# Patient Record
Sex: FEMALE | Race: WHITE | NOT HISPANIC OR LATINO | Employment: OTHER
[De-identification: names, ages, dates, MRNs, and addresses within clinical notes are randomized per-mention and may not be internally consistent; named-entity substitution may affect disease eponyms.]

---

## 2018-11-05 ENCOUNTER — RECORDS - HEALTHEAST (OUTPATIENT)
Dept: ADMINISTRATIVE | Facility: OTHER | Age: 78
End: 2018-11-05

## 2018-11-06 ENCOUNTER — AMBULATORY - HEALTHEAST (OUTPATIENT)
Dept: SCHEDULING | Facility: CLINIC | Age: 78
End: 2018-11-06

## 2018-11-06 DIAGNOSIS — Z78.0 POSTMENOPAUSAL: ICD-10-CM

## 2018-11-06 DIAGNOSIS — M85.80 OSTEOPENIA: ICD-10-CM

## 2018-12-12 ENCOUNTER — HOSPITAL ENCOUNTER (OUTPATIENT)
Dept: MAMMOGRAPHY | Facility: CLINIC | Age: 78
Discharge: HOME | End: 2018-12-12
Attending: INTERNAL MEDICINE

## 2018-12-12 DIAGNOSIS — Z12.31 VISIT FOR SCREENING MAMMOGRAM: ICD-10-CM

## 2019-12-20 ENCOUNTER — HOSPITAL ENCOUNTER (OUTPATIENT)
Dept: MAMMOGRAPHY | Facility: CLINIC | Age: 79
Discharge: HOME | End: 2019-12-20
Attending: INTERNAL MEDICINE

## 2019-12-20 DIAGNOSIS — Z12.31 VISIT FOR SCREENING MAMMOGRAM: ICD-10-CM

## 2020-01-06 ENCOUNTER — TRANSFERRED RECORDS (OUTPATIENT)
Dept: HEALTH INFORMATION MANAGEMENT | Facility: CLINIC | Age: 80
End: 2020-01-06

## 2020-01-09 ENCOUNTER — HOSPITAL ENCOUNTER (OUTPATIENT)
Dept: RADIOLOGY | Facility: CLINIC | Age: 80
Discharge: HOME | End: 2020-01-09
Attending: INTERNAL MEDICINE

## 2020-01-09 ENCOUNTER — RECORDS - HEALTHEAST (OUTPATIENT)
Dept: ADMINISTRATIVE | Facility: OTHER | Age: 80
End: 2020-01-09

## 2020-01-09 DIAGNOSIS — R52 PAIN: ICD-10-CM

## 2020-09-25 ENCOUNTER — TRANSFERRED RECORDS (OUTPATIENT)
Dept: HEALTH INFORMATION MANAGEMENT | Facility: CLINIC | Age: 80
End: 2020-09-25
Payer: COMMERCIAL

## 2020-10-19 ENCOUNTER — TRANSFERRED RECORDS (OUTPATIENT)
Dept: HEALTH INFORMATION MANAGEMENT | Facility: CLINIC | Age: 80
End: 2020-10-19
Payer: COMMERCIAL

## 2021-05-20 ENCOUNTER — HOSPITAL ENCOUNTER (OUTPATIENT)
Dept: MAMMOGRAPHY | Facility: CLINIC | Age: 81
Discharge: HOME | End: 2021-05-20
Attending: INTERNAL MEDICINE

## 2021-05-20 DIAGNOSIS — Z12.31 SCREENING MAMMOGRAM, ENCOUNTER FOR: ICD-10-CM

## 2021-05-24 ENCOUNTER — RECORDS - HEALTHEAST (OUTPATIENT)
Dept: ADMINISTRATIVE | Facility: CLINIC | Age: 81
End: 2021-05-24

## 2021-05-25 ENCOUNTER — RECORDS - HEALTHEAST (OUTPATIENT)
Dept: ADMINISTRATIVE | Facility: CLINIC | Age: 81
End: 2021-05-25

## 2021-05-26 ENCOUNTER — RECORDS - HEALTHEAST (OUTPATIENT)
Dept: ADMINISTRATIVE | Facility: CLINIC | Age: 81
End: 2021-05-26

## 2021-05-27 ENCOUNTER — RECORDS - HEALTHEAST (OUTPATIENT)
Dept: ADMINISTRATIVE | Facility: CLINIC | Age: 81
End: 2021-05-27

## 2021-05-28 ENCOUNTER — RECORDS - HEALTHEAST (OUTPATIENT)
Dept: ADMINISTRATIVE | Facility: CLINIC | Age: 81
End: 2021-05-28

## 2021-05-30 ENCOUNTER — RECORDS - HEALTHEAST (OUTPATIENT)
Dept: ADMINISTRATIVE | Facility: CLINIC | Age: 81
End: 2021-05-30

## 2021-06-01 ENCOUNTER — RECORDS - HEALTHEAST (OUTPATIENT)
Dept: ADMINISTRATIVE | Facility: CLINIC | Age: 81
End: 2021-06-01

## 2021-06-02 ENCOUNTER — RECORDS - HEALTHEAST (OUTPATIENT)
Dept: ADMINISTRATIVE | Facility: CLINIC | Age: 81
End: 2021-06-02

## 2021-06-05 ENCOUNTER — RECORDS - HEALTHEAST (OUTPATIENT)
Dept: SCHEDULING | Facility: CLINIC | Age: 81
End: 2021-06-05

## 2021-06-05 DIAGNOSIS — R10.32 ABDOMINAL PAIN, LEFT LOWER QUADRANT: ICD-10-CM

## 2021-06-16 PROBLEM — I26.99 PULMONARY EMBOLISM ON RIGHT (H): Status: ACTIVE | Noted: 2020-01-06

## 2021-06-16 PROBLEM — J45.21 MILD INTERMITTENT ASTHMA WITH EXACERBATION: Status: ACTIVE | Noted: 2020-01-06

## 2021-06-16 PROBLEM — I26.99 ACUTE PULMONARY EMBOLISM WITHOUT ACUTE COR PULMONALE, UNSPECIFIED PULMONARY EMBOLISM TYPE (H): Status: ACTIVE | Noted: 2020-01-06

## 2021-07-13 ENCOUNTER — RECORDS - HEALTHEAST (OUTPATIENT)
Dept: ADMINISTRATIVE | Facility: CLINIC | Age: 81
End: 2021-07-13

## 2021-07-21 ENCOUNTER — RECORDS - HEALTHEAST (OUTPATIENT)
Dept: ADMINISTRATIVE | Facility: CLINIC | Age: 81
End: 2021-07-21

## 2021-09-07 RX ORDER — BECLOMETHASONE DIPROPIONATE HFA 80 UG/1
AEROSOL, METERED RESPIRATORY (INHALATION)
COMMUNITY
Start: 2021-05-28 | End: 2022-01-02

## 2021-09-07 RX ORDER — HYDROCODONE BITARTRATE AND ACETAMINOPHEN 5; 325 MG/1; MG/1
TABLET ORAL
COMMUNITY
Start: 2020-12-03 | End: 2021-09-08

## 2021-09-08 ENCOUNTER — OFFICE VISIT (OUTPATIENT)
Dept: FAMILY MEDICINE | Facility: CLINIC | Age: 81
End: 2021-09-08
Payer: COMMERCIAL

## 2021-09-08 DIAGNOSIS — M19.90 OSTEOARTHRITIS, UNSPECIFIED OSTEOARTHRITIS TYPE, UNSPECIFIED SITE: ICD-10-CM

## 2021-09-08 DIAGNOSIS — J45.21 MILD INTERMITTENT ASTHMA WITH EXACERBATION: ICD-10-CM

## 2021-09-08 DIAGNOSIS — E78.00 HYPERCHOLESTEREMIA: ICD-10-CM

## 2021-09-08 DIAGNOSIS — I10 BENIGN ESSENTIAL HYPERTENSION: ICD-10-CM

## 2021-09-08 DIAGNOSIS — Z00.00 ENCOUNTER FOR MEDICAL EXAMINATION TO ESTABLISH CARE: Primary | ICD-10-CM

## 2021-09-08 DIAGNOSIS — I26.99 ACUTE PULMONARY EMBOLISM WITHOUT ACUTE COR PULMONALE, UNSPECIFIED PULMONARY EMBOLISM TYPE (H): ICD-10-CM

## 2021-09-08 PROCEDURE — 99204 OFFICE O/P NEW MOD 45 MIN: CPT | Mod: 25 | Performed by: FAMILY MEDICINE

## 2021-09-08 PROCEDURE — 90662 IIV NO PRSV INCREASED AG IM: CPT | Performed by: FAMILY MEDICINE

## 2021-09-08 PROCEDURE — G0008 ADMIN INFLUENZA VIRUS VAC: HCPCS | Performed by: FAMILY MEDICINE

## 2021-09-08 RX ORDER — IBUPROFEN 600 MG/1
600 TABLET, FILM COATED ORAL
COMMUNITY
Start: 2020-09-21 | End: 2021-12-13

## 2021-09-08 ASSESSMENT — ASTHMA QUESTIONNAIRES
QUESTION_3 LAST FOUR WEEKS HOW OFTEN DID YOUR ASTHMA SYMPTOMS (WHEEZING, COUGHING, SHORTNESS OF BREATH, CHEST TIGHTNESS OR PAIN) WAKE YOU UP AT NIGHT OR EARLIER THAN USUAL IN THE MORNING: TWO OR THREE NIGHTS A WEEK
QUESTION_5 LAST FOUR WEEKS HOW WOULD YOU RATE YOUR ASTHMA CONTROL: WELL CONTROLLED
ACT_TOTALSCORE: 15
QUESTION_4 LAST FOUR WEEKS HOW OFTEN HAVE YOU USED YOUR RESCUE INHALER OR NEBULIZER MEDICATION (SUCH AS ALBUTEROL): ONE OR TWO TIMES PER DAY
QUESTION_2 LAST FOUR WEEKS HOW OFTEN HAVE YOU HAD SHORTNESS OF BREATH: THREE TO SIX TIMES A WEEK
QUESTION_1 LAST FOUR WEEKS HOW MUCH OF THE TIME DID YOUR ASTHMA KEEP YOU FROM GETTING AS MUCH DONE AT WORK, SCHOOL OR AT HOME: A LITTLE OF THE TIME

## 2021-09-08 ASSESSMENT — MIFFLIN-ST. JEOR: SCORE: 1397.82

## 2021-09-08 NOTE — PATIENT INSTRUCTIONS
Check with pharmacy about shingles and Tetanus shots    BP - we would like this to stay under 150/90 - please call me if it is routinely over that number and we can adjust medications a little    Try Qvar twice daily for a few weeks

## 2021-09-09 VITALS
BODY MASS INDEX: 33.86 KG/M2 | DIASTOLIC BLOOD PRESSURE: 86 MMHG | TEMPERATURE: 98.8 F | WEIGHT: 203.25 LBS | SYSTOLIC BLOOD PRESSURE: 158 MMHG | HEIGHT: 65 IN | HEART RATE: 72 BPM | RESPIRATION RATE: 16 BRPM

## 2021-09-09 PROBLEM — I26.99 PULMONARY EMBOLISM ON RIGHT (H): Status: RESOLVED | Noted: 2020-01-06 | Resolved: 2021-09-09

## 2021-09-09 PROBLEM — I26.99 PULMONARY EMBOLISM, BILATERAL (H): Status: ACTIVE | Noted: 2021-04-29

## 2021-09-09 PROBLEM — M85.80 OSTEOPENIA: Status: ACTIVE | Noted: 2020-03-28

## 2021-09-09 PROBLEM — K85.10 GALLSTONE PANCREATITIS: Status: ACTIVE | Noted: 2021-09-09

## 2021-09-09 PROBLEM — J45.50 SEVERE PERSISTENT ASTHMA WITHOUT COMPLICATION (H): Status: ACTIVE | Noted: 2020-03-28

## 2021-09-09 PROBLEM — J45.50 SEVERE PERSISTENT ASTHMA WITHOUT COMPLICATION (H): Status: RESOLVED | Noted: 2020-03-28 | Resolved: 2021-09-09

## 2021-09-09 PROBLEM — I26.99 PULMONARY EMBOLISM, BILATERAL (H): Status: RESOLVED | Noted: 2021-04-29 | Resolved: 2021-09-09

## 2021-09-09 PROBLEM — I26.99 ACUTE PULMONARY EMBOLISM WITHOUT ACUTE COR PULMONALE, UNSPECIFIED PULMONARY EMBOLISM TYPE (H): Status: RESOLVED | Noted: 2020-01-06 | Resolved: 2021-09-09

## 2021-09-09 ASSESSMENT — ASTHMA QUESTIONNAIRES: ACT_TOTALSCORE: 15

## 2021-09-09 NOTE — PROGRESS NOTES
Assessment/Plan:     Aranza Mcmanus is a 81 year old female presenting for:    Encounter for medical examination to establish care  We reviewed patient's medical history today.  We will have her follow-up for her complete physical examination sometime in the next few months.    Mild intermittent asthma with exacerbation  Discussed using her Qvar twice daily instead of once daily for the next few weeks.  Discussed using albuterol scheduled every 6-8 hours for the next few days and then as needed thereafter.  No wheezing today.    Hypercholesteremia  We will recheck at her physical.    Acute pulmonary embolism without acute cor pulmonale, unspecified pulmonary embolism type (H)  This did seem to be unprovoked.  Patient has had a discussion with her previous provider regarding long-term anticoagulant she is not interested in doing.  She did between 3 and 6 months of anticoagulation after the diagnosis.    Osteoarthritis, unspecified osteoarthritis type, unspecified site  Encouraged her to take Tylenol once or twice daily as needed    Benign essential hypertension  Blood pressure above goal today.  Patient feels strongly that this is likely due to meeting a new provider and coming to the doctor's office.  She does have a cuff at home.  She will continue to monitor and will let me know over the next 1 to 2 weeks what her blood pressures are.  Given her age I think less than 150/90 would be adequate but aiming more towards the 140 range.        Medications Discontinued During This Encounter   Medication Reason     beclomethasone (QVAR) 80 mcg/actuation inhaler      apixaban (ELIQUIS) 5 mg Tab tablet      HYDROcodone-acetaminophen (NORCO) 5-325 MG tablet      polyethylene glycol (MIRALAX) 17 gram packet      oxyCODONE (ROXICODONE) 5 MG immediate release tablet          Chief Complaint:  Establish Care          Subjective:     Aranza Mcmanus is a 81 year old female who is a new patient to our clinic presenting for  establishment of care.    Patient previous doctor is retiring.    She has a past medical history significant for hypertension for which she is on lisinopril and atenolol.  It is her previous blood pressures have been within good range.  She takes her medications without difficulty.    She is a history of asthma.  She is currently on Qvar which is prescribed twice daily but she is using just once daily.  She notes that in her apartment there is smoke from cigarettes it comes in her window which flares her symptoms.  Due to this she is actually thinking about moving at some point soon.  She is wondering in the meantime what she should do to help with her symptoms of mild shortness of breath.    Additionally has a history of hyperlipidemia currently on a statin medication and tolerating it well.    Has osteoarthritis and is wondering what medication would be best for her to take.  Recent kidney function was just slightly decreased and it was recommended she not take any NSAID medications.    Review of Systems -  12 point review of systems completed by patient and found to be negative except for what is stated above    Medications: reviewed -       Current Outpatient Medications:      albuterol (PROAIR HFA;PROVENTIL HFA;VENTOLIN HFA) 90 mcg/actuation inhaler, [ALBUTEROL (PROAIR HFA;PROVENTIL HFA;VENTOLIN HFA) 90 MCG/ACTUATION INHALER] Inhale 2 puffs every 4 (four) hours as needed for wheezing or shortness of breath., Disp: , Rfl:      atenolol (TENORMIN) 25 MG tablet, [ATENOLOL (TENORMIN) 25 MG TABLET] Take 25 mg by mouth every morning. , Disp: , Rfl:      calcium carbonate (OS-STERLING) 600 mg calcium (1,500 mg) tablet, [CALCIUM CARBONATE (OS-STERLING) 600 MG CALCIUM (1,500 MG) TABLET] Take 600 mg by mouth daily., Disp: , Rfl:      cholecalciferol, vitamin D3, 1,000 unit (25 mcg) tablet, [CHOLECALCIFEROL, VITAMIN D3, 1,000 UNIT (25 MCG) TABLET] Take 1,000 Units by mouth daily., Disp: , Rfl:      ibuprofen (ADVIL/MOTRIN) 600 MG  tablet, Take 600 mg by mouth, Disp: , Rfl:      lisinopril (PRINIVIL,ZESTRIL) 40 MG tablet, [LISINOPRIL (PRINIVIL,ZESTRIL) 40 MG TABLET] Take 40 mg by mouth every morning. , Disp: , Rfl:      multivitamin therapeutic (THERAGRAN) tablet, [MULTIVITAMIN THERAPEUTIC (THERAGRAN) TABLET] Take 1 tablet by mouth every morning., Disp: , Rfl:      pravastatin (PRAVACHOL) 40 MG tablet, [PRAVASTATIN (PRAVACHOL) 40 MG TABLET] Take 40 mg by mouth every morning. , Disp: , Rfl:      QVAR REDIHALER 80 MCG/ACT inhaler, , Disp: , Rfl:       Past medical history: reviewed -   Past Medical History:   Diagnosis Date     Acute blood loss anemia 2015     Arthritis      Asthma      Hypercholesteremia      Hypertension        Past surgical history: reviewed -   Past Surgical History:   Procedure Laterality Date     ANKLE SURGERY Left      FOOT SURGERY Left      HYSTERECTOMY         Family history: reviewed -   Family History   Problem Relation Age of Onset     Breast Cancer Maternal Grandmother 30.00        bilateral mastectomy done yrs ago.       Social history: reviewed -   Social History     Socioeconomic History     Marital status: Single     Spouse name: None     Number of children: None     Years of education: None     Highest education level: None   Occupational History     None   Tobacco Use     Smoking status: Former Smoker     Quit date: 1975     Years since quittin.7     Smokeless tobacco: Never Used   Substance and Sexual Activity     Alcohol use: Yes     Alcohol/week: 0.8 standard drinks     Comment: Alcoholic Drinks/day: occ.     Drug use: Not Currently     Types: Marijuana     Sexual activity: None   Other Topics Concern     None   Social History Narrative     None     Social Determinants of Health     Financial Resource Strain:      Difficulty of Paying Living Expenses:    Food Insecurity:      Worried About Running Out of Food in the Last Year:      Ran Out of Food in the Last Year:    Transportation Needs:  "     Lack of Transportation (Medical):      Lack of Transportation (Non-Medical):    Physical Activity:      Days of Exercise per Week:      Minutes of Exercise per Session:    Stress:      Feeling of Stress :    Social Connections:      Frequency of Communication with Friends and Family:      Frequency of Social Gatherings with Friends and Family:      Attends Sikh Services:      Active Member of Clubs or Organizations:      Attends Club or Organization Meetings:      Marital Status:    Intimate Partner Violence:      Fear of Current or Ex-Partner:      Emotionally Abused:      Physically Abused:      Sexually Abused:        Objective:  Vitals:    09/08/21 1058 09/08/21 1129   BP: (!) 168/98 (!) 158/86   Pulse: 72    Resp: 16    Temp: 98.8  F (37.1  C)    TempSrc: Tympanic    Weight: 92.2 kg (203 lb 4 oz)    Height: 1.651 m (5' 5\")          Body mass index is 33.82 kg/m .    Vital signs reviewed and stable  General: No acute distress  Psych: Appropriate affect  HEENT: moist mucous membranes, pupils equal, round, reactive to light and accomodation, posterior oropharynx clear of erythema or exudate, tympanic membranes are pearly grey bilaterally  Lymph: no cervical or supraclavicular lymphadenopathy  Cardiovascular: regular rate and rhythm with no murmur  Pulmonary: clear to auscultation bilaterally with no wheeze  Abdomen: soft, non tender, non distended with normo-active bowel sounds  Extremities: warm and well perfused with no edema  Skin: warm and dry with no rash      "

## 2021-10-20 DIAGNOSIS — I10 BENIGN ESSENTIAL HYPERTENSION: ICD-10-CM

## 2021-10-20 DIAGNOSIS — E78.00 HYPERCHOLESTEREMIA: Primary | ICD-10-CM

## 2021-10-20 RX ORDER — LISINOPRIL 40 MG/1
40 TABLET ORAL EVERY MORNING
Qty: 90 TABLET | Refills: 2 | Status: SHIPPED | OUTPATIENT
Start: 2021-10-20 | End: 2022-11-22

## 2021-10-20 RX ORDER — ATENOLOL 25 MG/1
25 TABLET ORAL EVERY MORNING
Qty: 90 TABLET | Refills: 2 | Status: SHIPPED | OUTPATIENT
Start: 2021-10-20 | End: 2022-11-22

## 2021-10-20 RX ORDER — PRAVASTATIN SODIUM 40 MG
40 TABLET ORAL EVERY MORNING
Qty: 90 TABLET | Refills: 2 | Status: SHIPPED | OUTPATIENT
Start: 2021-10-20 | End: 2022-11-22

## 2021-10-20 NOTE — TELEPHONE ENCOUNTER
Patient requesting refills to [facility].    Candida Shelton, ELIO Lopez

## 2021-10-20 NOTE — TELEPHONE ENCOUNTER
Routing refill request to provider for review/approval because:  Medication is reported/historical    Ian Gilman RN

## 2021-12-08 PROBLEM — R73.03 PREDIABETES: Status: ACTIVE | Noted: 2020-03-28

## 2021-12-13 ENCOUNTER — OFFICE VISIT (OUTPATIENT)
Dept: FAMILY MEDICINE | Facility: CLINIC | Age: 81
End: 2021-12-13
Payer: COMMERCIAL

## 2021-12-13 VITALS
BODY MASS INDEX: 34.21 KG/M2 | RESPIRATION RATE: 16 BRPM | HEIGHT: 64 IN | DIASTOLIC BLOOD PRESSURE: 80 MMHG | SYSTOLIC BLOOD PRESSURE: 142 MMHG | TEMPERATURE: 98.5 F | HEART RATE: 72 BPM | WEIGHT: 200.38 LBS

## 2021-12-13 DIAGNOSIS — Z00.00 ENCOUNTER FOR MEDICARE ANNUAL WELLNESS EXAM: Primary | ICD-10-CM

## 2021-12-13 DIAGNOSIS — R30.0 DYSURIA: ICD-10-CM

## 2021-12-13 DIAGNOSIS — R07.9 CHEST PAIN, UNSPECIFIED TYPE: ICD-10-CM

## 2021-12-13 LAB
ALBUMIN SERPL-MCNC: 3.4 G/DL (ref 3.4–5)
ALBUMIN UR-MCNC: 30 MG/DL
ALP SERPL-CCNC: 79 U/L (ref 40–150)
ALT SERPL W P-5'-P-CCNC: 27 U/L (ref 0–50)
ANION GAP SERPL CALCULATED.3IONS-SCNC: 4 MMOL/L (ref 3–14)
APPEARANCE UR: CLEAR
AST SERPL W P-5'-P-CCNC: 16 U/L (ref 0–45)
BACTERIA #/AREA URNS HPF: ABNORMAL /HPF
BILIRUB SERPL-MCNC: 0.5 MG/DL (ref 0.2–1.3)
BILIRUB UR QL STRIP: NEGATIVE
BUN SERPL-MCNC: 18 MG/DL (ref 7–30)
CALCIUM SERPL-MCNC: 9.1 MG/DL (ref 8.5–10.1)
CHLORIDE BLD-SCNC: 108 MMOL/L (ref 94–109)
CHOLEST SERPL-MCNC: 175 MG/DL
CO2 SERPL-SCNC: 29 MMOL/L (ref 20–32)
COLOR UR AUTO: YELLOW
CREAT SERPL-MCNC: 0.95 MG/DL (ref 0.52–1.04)
FASTING STATUS PATIENT QL REPORTED: YES
GFR SERPL CREATININE-BSD FRML MDRD: 57 ML/MIN/1.73M2
GLUCOSE BLD-MCNC: 88 MG/DL (ref 70–99)
GLUCOSE UR STRIP-MCNC: NEGATIVE MG/DL
HBA1C MFR BLD: 5.8 % (ref 0–5.6)
HDLC SERPL-MCNC: 46 MG/DL
HGB UR QL STRIP: ABNORMAL
KETONES UR STRIP-MCNC: NEGATIVE MG/DL
LDLC SERPL CALC-MCNC: 84 MG/DL
LEUKOCYTE ESTERASE UR QL STRIP: ABNORMAL
NITRATE UR QL: NEGATIVE
NONHDLC SERPL-MCNC: 129 MG/DL
PH UR STRIP: 5.5 [PH] (ref 5–7)
POTASSIUM BLD-SCNC: 4.2 MMOL/L (ref 3.4–5.3)
PROT SERPL-MCNC: 7.1 G/DL (ref 6.8–8.8)
RBC #/AREA URNS AUTO: ABNORMAL /HPF
SODIUM SERPL-SCNC: 141 MMOL/L (ref 133–144)
SP GR UR STRIP: >=1.03 (ref 1–1.03)
SQUAMOUS #/AREA URNS AUTO: ABNORMAL /LPF
TRIGL SERPL-MCNC: 227 MG/DL
UROBILINOGEN UR STRIP-ACNC: 0.2 E.U./DL
WBC #/AREA URNS AUTO: ABNORMAL /HPF

## 2021-12-13 PROCEDURE — 87088 URINE BACTERIA CULTURE: CPT | Performed by: FAMILY MEDICINE

## 2021-12-13 PROCEDURE — 93000 ELECTROCARDIOGRAM COMPLETE: CPT | Performed by: FAMILY MEDICINE

## 2021-12-13 PROCEDURE — 87086 URINE CULTURE/COLONY COUNT: CPT | Performed by: FAMILY MEDICINE

## 2021-12-13 PROCEDURE — 83036 HEMOGLOBIN GLYCOSYLATED A1C: CPT | Performed by: FAMILY MEDICINE

## 2021-12-13 PROCEDURE — 36415 COLL VENOUS BLD VENIPUNCTURE: CPT | Performed by: FAMILY MEDICINE

## 2021-12-13 PROCEDURE — 81001 URINALYSIS AUTO W/SCOPE: CPT | Performed by: FAMILY MEDICINE

## 2021-12-13 PROCEDURE — 80061 LIPID PANEL: CPT | Performed by: FAMILY MEDICINE

## 2021-12-13 PROCEDURE — 99397 PER PM REEVAL EST PAT 65+ YR: CPT | Performed by: FAMILY MEDICINE

## 2021-12-13 PROCEDURE — 80053 COMPREHEN METABOLIC PANEL: CPT | Performed by: FAMILY MEDICINE

## 2021-12-13 PROCEDURE — 87186 SC STD MICRODIL/AGAR DIL: CPT | Performed by: FAMILY MEDICINE

## 2021-12-13 PROCEDURE — 99214 OFFICE O/P EST MOD 30 MIN: CPT | Mod: 25 | Performed by: FAMILY MEDICINE

## 2021-12-13 RX ORDER — SULFAMETHOXAZOLE/TRIMETHOPRIM 800-160 MG
1 TABLET ORAL 2 TIMES DAILY
Qty: 14 TABLET | Refills: 0 | Status: SHIPPED | OUTPATIENT
Start: 2021-12-13 | End: 2022-03-01

## 2021-12-13 ASSESSMENT — ASTHMA QUESTIONNAIRES
QUESTION_4 LAST FOUR WEEKS HOW OFTEN HAVE YOU USED YOUR RESCUE INHALER OR NEBULIZER MEDICATION (SUCH AS ALBUTEROL): ONCE A WEEK OR LESS
QUESTION_3 LAST FOUR WEEKS HOW OFTEN DID YOUR ASTHMA SYMPTOMS (WHEEZING, COUGHING, SHORTNESS OF BREATH, CHEST TIGHTNESS OR PAIN) WAKE YOU UP AT NIGHT OR EARLIER THAN USUAL IN THE MORNING: ONCE A WEEK
QUESTION_2 LAST FOUR WEEKS HOW OFTEN HAVE YOU HAD SHORTNESS OF BREATH: THREE TO SIX TIMES A WEEK
QUESTION_5 LAST FOUR WEEKS HOW WOULD YOU RATE YOUR ASTHMA CONTROL: SOMEWHAT CONTROLLED
QUESTION_1 LAST FOUR WEEKS HOW MUCH OF THE TIME DID YOUR ASTHMA KEEP YOU FROM GETTING AS MUCH DONE AT WORK, SCHOOL OR AT HOME: SOME OF THE TIME
ACT_TOTALSCORE: 16

## 2021-12-13 ASSESSMENT — ENCOUNTER SYMPTOMS
HEARTBURN: 0
NAUSEA: 0
DIARRHEA: 0
FREQUENCY: 1
ARTHRALGIAS: 1
ABDOMINAL PAIN: 0
HEADACHES: 0
FEVER: 0
SHORTNESS OF BREATH: 1
PARESTHESIAS: 1
CONSTIPATION: 0
PALPITATIONS: 1
WEAKNESS: 0
EYE PAIN: 0
COUGH: 0
HEMATOCHEZIA: 0
BREAST MASS: 0
SORE THROAT: 0
NERVOUS/ANXIOUS: 0
DIZZINESS: 0
JOINT SWELLING: 0
MYALGIAS: 1
HEMATURIA: 0
DYSURIA: 0
CHILLS: 0

## 2021-12-13 ASSESSMENT — ACTIVITIES OF DAILY LIVING (ADL): CURRENT_FUNCTION: NO ASSISTANCE NEEDED

## 2021-12-13 ASSESSMENT — MIFFLIN-ST. JEOR: SCORE: 1372.87

## 2021-12-13 NOTE — LETTER
December 16, 2021      Aranza Mcmanus  [address]   [facility] [place] [facility] [address]        Dear Ms.Yonathan,    We are writing to inform you of your test results.    It was great to see you.  Your urine did have a mild infection and the antibiotic given should have taken care of it!     Kidneys, electrolytes, blood sugar and liver function are normal.     A1C is just into the pre-diabetic range - we can recheck next year!     Cholesterol looks good overall.     Let me know if you have any questions!     Dr Mercedes/dakotah    Resulted Orders   Lipid panel reflex to direct LDL Fasting   Result Value Ref Range    Cholesterol 175 <200 mg/dL    Triglycerides 227 (H) <150 mg/dL    Direct Measure HDL 46 (L) >=50 mg/dL    LDL Cholesterol Calculated 84 <=100 mg/dL    Non HDL Cholesterol 129 <130 mg/dL    Patient Fasting > 8hrs? Yes     Narrative    Cholesterol  Desirable:  <200 mg/dL    Triglycerides  Normal:  Less than 150 mg/dL  Borderline High:  150-199 mg/dL  High:  200-499 mg/dL  Very High:  Greater than or equal to 500 mg/dL    Direct Measure HDL  Female:  Greater than or equal to 50 mg/dL   Male:  Greater than or equal to 40 mg/dL    LDL Cholesterol  Desirable:  <100mg/dL  Above Desirable:  100-129 mg/dL   Borderline High:  130-159 mg/dL   High:  160-189 mg/dL   Very High:  >= 190 mg/dL    Non HDL Cholesterol  Desirable:  130 mg/dL  Above Desirable:  130-159 mg/dL  Borderline High:  160-189 mg/dL  High:  190-219 mg/dL  Very High:  Greater than or equal to 220 mg/dL   COMPREHENSIVE METABOLIC PANEL   Result Value Ref Range    Sodium 141 133 - 144 mmol/L    Potassium 4.2 3.4 - 5.3 mmol/L    Chloride 108 94 - 109 mmol/L    Carbon Dioxide (CO2) 29 20 - 32 mmol/L    Anion Gap 4 3 - 14 mmol/L    Urea Nitrogen 18 7 - 30 mg/dL    Creatinine 0.95 0.52 - 1.04 mg/dL    Calcium 9.1 8.5 - 10.1 mg/dL    Glucose 88 70 - 99 mg/dL    Alkaline Phosphatase 79 40 - 150 U/L    AST 16 0 - 45 U/L    ALT 27 0 - 50 U/L    Protein Total 7.1 6.8 - 8.8  g/dL    Albumin 3.4 3.4 - 5.0 g/dL    Bilirubin Total 0.5 0.2 - 1.3 mg/dL    GFR Estimate 57 (L) >60 mL/min/1.73m2      Comment:      As of July 11, 2021, eGFR is calculated by the CKD-EPI creatinine equation, without race adjustment. eGFR can be influenced by muscle mass, exercise, and diet. The reported eGFR is an estimation only and is only applicable if the renal function is stable.   HEMOGLOBIN A1C   Result Value Ref Range    Hemoglobin A1C 5.8 (H) 0.0 - 5.6 %      Comment:      Normal <5.7%   Prediabetes 5.7-6.4%    Diabetes 6.5% or higher     Note: Adopted from ADA consensus guidelines.   UA Macro with Reflex to Micro and Culture - lab collect   Result Value Ref Range    Color Urine Yellow Colorless, Straw, Light Yellow, Yellow    Appearance Urine Clear Clear    Glucose Urine Negative Negative mg/dL    Bilirubin Urine Negative Negative    Ketones Urine Negative Negative mg/dL    Specific Gravity Urine >=1.030 1.003 - 1.035    Blood Urine Large (A) Negative    pH Urine 5.5 5.0 - 7.0    Protein Albumin Urine 30  (A) Negative mg/dL    Urobilinogen Urine 0.2 0.2, 1.0 E.U./dL    Nitrite Urine Negative Negative    Leukocyte Esterase Urine Moderate (A) Negative   Urine Microscopic Exam   Result Value Ref Range    Bacteria Urine Moderate (A) None Seen /HPF    RBC Urine 5-10 (A) 0-2 /HPF /HPF    WBC Urine  (A) 0-5 /HPF /HPF    Squamous Epithelials Urine Few (A) None Seen /LPF   Urine Culture   Result Value Ref Range    Culture 10,000-50,000 CFU/mL Staphylococcus pasteuri (A)       Comment:      Identification obtained by MALDI-TOF mass spectrometry research use only database. Test characteristics determined and verified by the [facility].    Culture <10,000 CFU/mL Urogenital annelise

## 2021-12-13 NOTE — PROGRESS NOTES
"SUBJECTIVE:   Aranza Mcmanus is a 81 year old female who presents for Preventive Visit.    Patient has been advised of split billing requirements and indicates understanding: Yes   Are you in the first 12 months of your Medicare coverage?  No    Healthy Habits:     In general, how would you rate your overall health?  Good    Frequency of exercise:  2-3 days/week    Duration of exercise:  30-45 minutes    Do you usually eat at least 4 servings of fruit and vegetables a day, include whole grains    & fiber and avoid regularly eating high fat or \"junk\" foods?  Yes    Taking medications regularly:  Yes    Medication side effects:  Not applicable    Ability to successfully perform activities of daily living:  No assistance needed    Home Safety:  No safety concerns identified    Hearing Impairment:  Need to ask people to speak up or repeat themselves    In the past 6 months, have you been bothered by leaking of urine? Yes    In general, how would you rate your overall mental or emotional health?  Good      PHQ-2 Total Score: 0    Additional concerns today:  Yes    Do you feel safe in your environment? Yes    Have you ever done Advance Care Planning? (For example, a Health Directive, POLST, or a discussion with a medical provider or your loved ones about your wishes): No, advance care planning information given to patient to review.  Patient plans to discuss their wishes with loved ones or provider.        Fall risk  Fallen 2 or more times in the past year?: No  Any fall with injury in the past year?: No    Cognitive Screening   1) Repeat 3 items (Leader, Season, Table)    2) Clock draw: NORMAL  3) 3 item recall: Recalls 3 objects  Results: 3 items recalled: COGNITIVE IMPAIRMENT LESS LIKELY    Mini-CogTM Copyright ENID Evans. Licensed by the author for use in [facility]; reprinted with permission (bonny@.[facility]). All rights reserved.      Do you have sleep apnea, excessive snoring or daytime drowsiness?: " no    Reviewed and updated as needed this visit by clinical staff  Tobacco  Allergies  Meds           Patient has 2 main concerns today:    1.  Dysuria: Patient notes that recently she has had some issues with painful urination.  She also notes that she has been going week more frequently than normal.  She would like to rule out a urinary infection.  She has not noticed any blood in her urine.    2.  Chest pain: Present of her visit today patient mentions that she has been experiencing intermittent chest pain.  She states that last week this was so severe that it lasted the whole day and she was unable to really do much because her chest hurt.  She did not experience extreme shortness of breath or lightheadedness.  The pain was not positional.  The pain is not related to activity.  It was not related to eating.  She does not have a history of vascular events.  There is a family history of myocardial infarction.     Social History     Tobacco Use     Smoking status: Former Smoker     Quit date: 1975     Years since quittin.9     Smokeless tobacco: Never Used   Substance Use Topics     Alcohol use: Yes     Alcohol/week: 0.8 standard drinks     Comment: Alcoholic Drinks/day: occ.     If you drink alcohol do you typically have >3 drinks per day or >7 drinks per week? No    Alcohol Use 2021   Prescreen: >3 drinks/day or >7 drinks/week? No         Current providers sharing in care for this patient include:   Patient Care Team:  Patti Greene as PCP - Piper Marrero MD as Assigned PCP    The following health maintenance items are reviewed in Epic and correct as of today:  Health Maintenance Due   Topic Date Due     ASTHMA ACTION PLAN  Never done     HEPATITIS C SCREENING  Never done     DTAP/TDAP/TD IMMUNIZATION (2 - Td or Tdap) 2019     ZOSTER IMMUNIZATION (3 of 3) 2021     Pertinent mammograms are reviewed under the imaging tab.    Review of Systems  Constitutional,  "HEENT, cardiovascular, pulmonary, GI, , musculoskeletal, neuro, skin, endocrine and psych systems are negative, except as otherwise noted.    OBJECTIVE:   BP (!) 142/80   Pulse 72   Temp 98.5  F (36.9  C) (Tympanic)   Resp 16   Ht 1.632 m (5' 4.25\")   Wt 90.9 kg (200 lb 6 oz)   Breastfeeding No   BMI 34.13 kg/m   Estimated body mass index is 34.13 kg/m  as calculated from the following:    Height as of this encounter: 1.632 m (5' 4.25\").    Weight as of this encounter: 90.9 kg (200 lb 6 oz).  Physical Exam    Physical Exam:  General Appearance: Alert, cooperative, no distress, appears stated age   Head: Normocephalic, without obvious abnormality, atraumatic  Eyes: PERRL, conjunctiva/corneas clear, EOM's intact   Ears: Normal TM's and external ear canals, both ears  Neck: Supple, symmetrical, trachea midline, no adenopathy;  thyroid: not enlarged, symmetric, no tenderness/mass/nodules  Back: Symmetric, no curvature, ROM normal,  Lungs: Clear to auscultation bilaterally, respirations unlabored  Breasts: No breast masses, tenderness, asymmetry, or nipple discharge.  Heart: Regular rate and rhythm, S1 and S2 normal, no murmur, rub, or gallop  Abdomen: Soft, non-tender, bowel sounds active all four quadrants,  no masses, no organomegaly  Extremities: Extremities normal, atraumatic, no cyanosis or edema  Skin: Skin color, texture, turgor normal, no rashes or lesions  Lymph nodes: Cervical, supraclavicular, and axillary nodes normal and   Neurologic: Normal      ASSESSMENT / PLAN:   1. Encounter for Medicare annual wellness exam  - Lipid panel reflex to direct LDL Fasting; Future  - COMPREHENSIVE METABOLIC PANEL; Future  - REVIEW OF HEALTH MAINTENANCE PROTOCOL ORDERS  - HEMOGLOBIN A1C; Future  - Lipid panel reflex to direct LDL Fasting  - COMPREHENSIVE METABOLIC PANEL  - HEMOGLOBIN A1C    2. Dysuria  Urinalysis done.  Does not appear to be slightly infected.  Macrobid sent to the pharmacy.  Discussed risk and " "benefits of medication.  - UA Macro with Reflex to Micro and Culture - lab collect; Future  - UA Macro with Reflex to Micro and Culture - lab collect  - Urine Microscopic Exam  - Urine Culture  - sulfamethoxazole-trimethoprim (BACTRIM DS) 800-160 MG tablet; Take 1 tablet by mouth 2 times daily  Dispense: 14 tablet; Refill: 0    3. Chest pain, unspecified type  Etiology of chest pain is somewhat unclear.  This certainly is concerning for a cardiac event given the length and severity as well as nonrelation to eating of this pain.  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  The inferior infarct had been seen questionably on previous EKGs but the anterior infarct is new.    Because of this I have recommended a Lexiscan stress test.  I have also given her the option to talk with cardiology but she prefers a stress test.  If she has the pain again I advised her that she needs to be seen in the emergency department emergently.  - EKG 12-lead complete w/read - Clinics      Patient has been advised of split billing requirements and indicates understanding: Yes  COUNSELING:  Reviewed preventive health counseling, as reflected in patient instructions    Estimated body mass index is 34.13 kg/m  as calculated from the following:    Height as of this encounter: 1.632 m (5' 4.25\").    Weight as of this encounter: 90.9 kg (200 lb 6 oz).    Weight management plan: Discussed healthy diet and exercise guidelines    She reports that she quit smoking about 46 years ago. She has never used smokeless tobacco.      Appropriate preventive services were discussed with this patient, including applicable screening as appropriate for cardiovascular disease, diabetes, osteopenia/osteoporosis, and glaucoma.  As appropriate for age/gender, discussed screening for colorectal cancer, prostate cancer, breast cancer, and cervical cancer. Checklist reviewing preventive services available has been given to the patient.    Reviewed " patients plan of care and provided an AVS. The Basic Care Plan (routine screening as documented in Health Maintenance) for Aranza meets the Care Plan requirement. This Care Plan has been established and reviewed with the Patient.    Counseling Resources:  ATP IV Guidelines  Pooled Cohorts Equation Calculator  Breast Cancer Risk Calculator  Breast Cancer: Medication to Reduce Risk  FRAX Risk Assessment  ICSI Preventive Guidelines  Dietary Guidelines for Americans, 2010  Band[employer]'s MyPlate  ASA Prophylaxis  Lung CA Screening    Piper Merceeds MD  [facility]    Identified Health Risks:

## 2021-12-13 NOTE — PATIENT INSTRUCTIONS
Patient Education   Personalized Prevention Plan  You are due for the preventive services outlined below.  Your care team is available to assist you in scheduling these services.  If you have already completed any of these items, please share that information with your care team to update in your medical record.  Health Maintenance Due   Topic Date Due     ANNUAL REVIEW OF HM ORDERS  Never done     Asthma Action Plan - yearly  Never done     Discuss Advance Care Planning  Never done     Hepatitis C Screening  Never done     Cholesterol Lab  Never done     FALL RISK ASSESSMENT  Never done     Diptheria Tetanus Pertussis (DTAP/TDAP/TD) Vaccine (2 - Td or Tdap) 12/07/2019     Zoster (Shingles) Vaccine (3 of 3) 03/06/2021

## 2021-12-14 ENCOUNTER — TELEPHONE (OUTPATIENT)
Dept: FAMILY MEDICINE | Facility: CLINIC | Age: 81
End: 2021-12-14
Payer: COMMERCIAL

## 2021-12-14 DIAGNOSIS — R07.9 CHEST PAIN, UNSPECIFIED TYPE: Primary | ICD-10-CM

## 2021-12-14 ASSESSMENT — ASTHMA QUESTIONNAIRES: ACT_TOTALSCORE: 16

## 2021-12-14 NOTE — TELEPHONE ENCOUNTER
Reason for Call: Request for an order or referral:    Order or referral being requested: Dr. Mercedes told pt to call back if/when she is ready to have a cardiac stress test done and she would like to go ahead and have that test done and needs orders placed and phone # to call to schedule test.  Please call patient and advise.      Date needed: at your convenience    Has the patient been seen by the PCP for this problem? YES    Additional comments:     Phone number Patient can be reached at:  Home number on file [phone] (home)    Best Time:  any    Can we leave a detailed message on this number?  YES    Call taken on 12/14/2021 at 4:00 PM by Piper Womack

## 2021-12-14 NOTE — TELEPHONE ENCOUNTER
I placed the order.  Please give her the number to schedule a cardiac imaging/procedure study at [facility].    Thanks    EB

## 2021-12-14 NOTE — TELEPHONE ENCOUNTER
Pooja notified to call [phone] to schedule heart stress test. Aranza agreed with plan.  Chinmay Reddy RN

## 2021-12-16 ENCOUNTER — TELEPHONE (OUTPATIENT)
Dept: FAMILY MEDICINE | Facility: CLINIC | Age: 81
End: 2021-12-16
Payer: COMMERCIAL

## 2021-12-16 DIAGNOSIS — R07.9 CHEST PAIN, UNSPECIFIED TYPE: Primary | ICD-10-CM

## 2021-12-16 LAB
BACTERIA UR CULT: ABNORMAL
BACTERIA UR CULT: ABNORMAL

## 2021-12-16 NOTE — TELEPHONE ENCOUNTER
Thank you so much for reaching out - I am fine with the Lexiscan instead (sorry - I did not know the dobutamin was such a process)!    I placed the Lexiscan order!    Thanks again!    Dr Mercedes

## 2021-12-16 NOTE — TELEPHONE ENCOUNTER
Good morning Dr. Mercedes,    Our office received a stress test order from you and I wanted to clarify that you were wanting a dobutamine stress echo, or if you were wanting a NM Lexiscan stress test? If you are wanting a dobutamine stress echo our clinic does have the ability to do these, however we don't often do them given our workflow requires an EP tech, cardiac nurse, echo tech and supervising hospitalist doctor to all be present in the room at the time of the test. This makes them quite difficult to schedule at our facility, as everyone's schedule doesn't always align.      We are more than happy to accommodate a dobutamine, however, they typically schedule out 4-6 weeks. If this is time sensitive, the patient could complete this test at [facility] or [facility], where they have more staff and could get this done in a more timely manner. Another option that we could accommodate much quicker here at [facility] would be a NM Lexiscan stress test, with the potential for a separate echo if needed. Please let us know what you would like to do.     Thanks,     Vito Jeter, MS, Exercise Physiologist   [facility]   [phone]

## 2021-12-19 ASSESSMENT — ACTIVITIES OF DAILY LIVING (ADL): CURRENT_FUNCTION: NO ASSISTANCE NEEDED

## 2022-01-02 DIAGNOSIS — J45.50 SEVERE PERSISTENT ASTHMA, UNCOMPLICATED (H): ICD-10-CM

## 2022-01-02 RX ORDER — ALBUTEROL SULFATE 90 UG/1
POWDER, METERED RESPIRATORY (INHALATION)
Qty: 1 EACH | Refills: 6 | Status: SHIPPED | OUTPATIENT
Start: 2022-01-02

## 2022-01-02 RX ORDER — BECLOMETHASONE DIPROPIONATE HFA 80 UG/1
AEROSOL, METERED RESPIRATORY (INHALATION)
Qty: 21.2 G | Refills: 3 | Status: SHIPPED | OUTPATIENT
Start: 2022-01-02 | End: 2023-01-05

## 2022-01-02 NOTE — TELEPHONE ENCOUNTER
Routing RX to Provider because they are listed as patient reported.  Thank you.  Chinmay Reddy, RN

## 2022-01-03 ENCOUNTER — TELEPHONE (OUTPATIENT)
Dept: FAMILY MEDICINE | Facility: CLINIC | Age: 82
End: 2022-01-03
Payer: COMMERCIAL

## 2022-01-03 DIAGNOSIS — J45.21 MILD INTERMITTENT ASTHMA WITH EXACERBATION: Primary | ICD-10-CM

## 2022-01-03 RX ORDER — ALBUTEROL SULFATE 90 UG/1
2 AEROSOL, METERED RESPIRATORY (INHALATION) EVERY 6 HOURS
Qty: 18 G | Refills: 0 | Status: SHIPPED | OUTPATIENT
Start: 2022-01-03 | End: 2022-01-26

## 2022-01-03 NOTE — TELEPHONE ENCOUNTER
Proair Respiclick not covered by patient's insurance.  Pharmacy recommending an HFA inhaler.  Ok to change or should I pursue a prior authorization?    MAR VELARDE

## 2022-01-10 ENCOUNTER — HOSPITAL ENCOUNTER (OUTPATIENT)
Dept: CARDIOLOGY | Facility: HOSPITAL | Age: 82
End: 2022-01-10
Attending: FAMILY MEDICINE
Payer: COMMERCIAL

## 2022-01-10 ENCOUNTER — HOSPITAL ENCOUNTER (OUTPATIENT)
Dept: NUCLEAR MEDICINE | Facility: HOSPITAL | Age: 82
End: 2022-01-10
Attending: FAMILY MEDICINE
Payer: COMMERCIAL

## 2022-01-10 DIAGNOSIS — R07.9 CHEST PAIN, UNSPECIFIED TYPE: ICD-10-CM

## 2022-01-10 LAB
CV STRESS CURRENT BP HE: NORMAL
CV STRESS CURRENT HR HE: 58
CV STRESS CURRENT HR HE: 58
CV STRESS CURRENT HR HE: 59
CV STRESS CURRENT HR HE: 72
CV STRESS CURRENT HR HE: 75
CV STRESS CURRENT HR HE: 75
CV STRESS CURRENT HR HE: 76
CV STRESS CURRENT HR HE: 77
CV STRESS CURRENT HR HE: 82
CV STRESS CURRENT HR HE: 82
CV STRESS CURRENT HR HE: 84
CV STRESS CURRENT HR HE: 85
CV STRESS DEVIATION TIME HE: NORMAL
CV STRESS ECHO PERCENT HR HE: NORMAL
CV STRESS EXERCISE STAGE HE: NORMAL
CV STRESS FINAL RESTING BP HE: NORMAL
CV STRESS FINAL RESTING HR HE: 75
CV STRESS MAX HR HE: 85
CV STRESS MAX TREADMILL GRADE HE: 0
CV STRESS MAX TREADMILL SPEED HE: 0
CV STRESS PEAK DIA BP HE: NORMAL
CV STRESS PEAK SYS BP HE: NORMAL
CV STRESS PHASE HE: NORMAL
CV STRESS PROTOCOL HE: NORMAL
CV STRESS RESTING PT POSITION HE: NORMAL
CV STRESS ST DEVIATION AMOUNT HE: NORMAL
CV STRESS ST DEVIATION ELEVATION HE: NORMAL
CV STRESS ST EVELATION AMOUNT HE: NORMAL
CV STRESS TEST TYPE HE: NORMAL
CV STRESS TOTAL STAGE TIME MIN 1 HE: NORMAL
RATE PRESSURE PRODUCT: NORMAL
STRESS ECHO BASELINE DIASTOLIC HE: 72
STRESS ECHO BASELINE HR: 58
STRESS ECHO BASELINE SYSTOLIC BP: 149
STRESS ECHO CALCULATED PERCENT HR: 60 %
STRESS ECHO LAST STRESS DIASTOLIC BP: 80
STRESS ECHO LAST STRESS HR: 77
STRESS ECHO LAST STRESS SYSTOLIC BP: 136
STRESS ECHO TARGET HR: 141

## 2022-01-10 PROCEDURE — 93018 CV STRESS TEST I&R ONLY: CPT | Performed by: GENERAL ACUTE CARE HOSPITAL

## 2022-01-10 PROCEDURE — A9500 TC99M SESTAMIBI: HCPCS | Performed by: FAMILY MEDICINE

## 2022-01-10 PROCEDURE — 78452 HT MUSCLE IMAGE SPECT MULT: CPT | Mod: 26 | Performed by: GENERAL ACUTE CARE HOSPITAL

## 2022-01-10 PROCEDURE — 250N000011 HC RX IP 250 OP 636: Performed by: FAMILY MEDICINE

## 2022-01-10 PROCEDURE — 93016 CV STRESS TEST SUPVJ ONLY: CPT | Performed by: INTERNAL MEDICINE

## 2022-01-10 PROCEDURE — 78452 HT MUSCLE IMAGE SPECT MULT: CPT

## 2022-01-10 PROCEDURE — 343N000001 HC RX 343: Performed by: FAMILY MEDICINE

## 2022-01-10 PROCEDURE — 93017 CV STRESS TEST TRACING ONLY: CPT | Performed by: INTERNAL MEDICINE

## 2022-01-10 RX ORDER — AMINOPHYLLINE 25 MG/ML
50 INJECTION, SOLUTION INTRAVENOUS
Status: DISCONTINUED | OUTPATIENT
Start: 2022-01-10 | End: 2022-01-10 | Stop reason: HOSPADM

## 2022-01-10 RX ORDER — CAFFEINE CITRATE 20 MG/ML
60 SOLUTION INTRAVENOUS
Status: DISCONTINUED | OUTPATIENT
Start: 2022-01-10 | End: 2022-01-10 | Stop reason: HOSPADM

## 2022-01-10 RX ORDER — ALBUTEROL SULFATE 0.83 MG/ML
2.5 SOLUTION RESPIRATORY (INHALATION)
Status: DISCONTINUED | OUTPATIENT
Start: 2022-01-10 | End: 2022-01-10 | Stop reason: HOSPADM

## 2022-01-10 RX ORDER — REGADENOSON 0.08 MG/ML
0.4 INJECTION, SOLUTION INTRAVENOUS ONCE
Status: COMPLETED | OUTPATIENT
Start: 2022-01-10 | End: 2022-01-10

## 2022-01-10 RX ORDER — CAFFEINE 200 MG
200 TABLET ORAL
Status: DISCONTINUED | OUTPATIENT
Start: 2022-01-10 | End: 2022-01-10 | Stop reason: HOSPADM

## 2022-01-10 RX ADMIN — Medication 31.7 MILLICURIE: at 11:45

## 2022-01-10 RX ADMIN — REGADENOSON 0.4 MG: 0.08 INJECTION, SOLUTION INTRAVENOUS at 10:15

## 2022-01-10 RX ADMIN — Medication 8.2 MCI.: at 09:13

## 2022-01-20 ENCOUNTER — TELEPHONE (OUTPATIENT)
Dept: FAMILY MEDICINE | Facility: CLINIC | Age: 82
End: 2022-01-20
Payer: COMMERCIAL

## 2022-01-20 NOTE — TELEPHONE ENCOUNTER
Pt looking for [facility]NT doctor that Daren would recommend.    Thank you,    Cadnida Shelton, ELIO Lopez

## 2022-01-20 NOTE — TELEPHONE ENCOUNTER
Hi -  I do not have any specific recommendations. I have had good luck with referring patients to the [facility] if she is interested.    If she would like a referral please indicate the indication for the referral as well.    EB

## 2022-01-24 DIAGNOSIS — J45.21 MILD INTERMITTENT ASTHMA WITH EXACERBATION: ICD-10-CM

## 2022-01-25 NOTE — TELEPHONE ENCOUNTER
Routing refill request to provider for review/approval because:  ACT=16  Priscilla Moore RN

## 2022-01-26 RX ORDER — ALBUTEROL SULFATE 90 UG/1
AEROSOL, METERED RESPIRATORY (INHALATION)
Qty: 18 G | Refills: 4 | Status: SHIPPED | OUTPATIENT
Start: 2022-01-26 | End: 2022-04-13

## 2022-01-28 DIAGNOSIS — J45.909 ASTHMA: Primary | ICD-10-CM

## 2022-02-18 ENCOUNTER — APPOINTMENT (OUTPATIENT)
Dept: RADIOLOGY | Facility: HOSPITAL | Age: 82
End: 2022-02-18
Attending: EMERGENCY MEDICINE
Payer: COMMERCIAL

## 2022-02-18 ENCOUNTER — HOSPITAL ENCOUNTER (EMERGENCY)
Facility: HOSPITAL | Age: 82
Discharge: HOME | End: 2022-02-18
Attending: EMERGENCY MEDICINE | Admitting: EMERGENCY MEDICINE
Payer: COMMERCIAL

## 2022-02-18 VITALS
DIASTOLIC BLOOD PRESSURE: 70 MMHG | WEIGHT: 200 LBS | HEART RATE: 61 BPM | SYSTOLIC BLOOD PRESSURE: 142 MMHG | OXYGEN SATURATION: 95 % | TEMPERATURE: 97.9 F | BODY MASS INDEX: 33.32 KG/M2 | RESPIRATION RATE: 24 BRPM | HEIGHT: 65 IN

## 2022-02-18 DIAGNOSIS — S42.92XA CLOSED FRACTURE DISLOCATION OF LEFT SHOULDER, INITIAL ENCOUNTER: ICD-10-CM

## 2022-02-18 PROCEDURE — 96375 TX/PRO/DX INJ NEW DRUG ADDON: CPT

## 2022-02-18 PROCEDURE — 96376 TX/PRO/DX INJ SAME DRUG ADON: CPT

## 2022-02-18 PROCEDURE — 999N000065 XR SHOULDER 2 VIEW LEFT: Mod: LT

## 2022-02-18 PROCEDURE — 999N000065 XR SHOULDER LEFT PORT 1 VIEW: Mod: LT

## 2022-02-18 PROCEDURE — 23605 CLTX PRX HMRL FX MNPJ+-TRACT: CPT | Mod: LT

## 2022-02-18 PROCEDURE — 73030 X-RAY EXAM OF SHOULDER: CPT | Mod: LT

## 2022-02-18 PROCEDURE — 96374 THER/PROPH/DIAG INJ IV PUSH: CPT | Mod: 59

## 2022-02-18 PROCEDURE — 99285 EMERGENCY DEPT VISIT HI MDM: CPT | Mod: 25

## 2022-02-18 PROCEDURE — 250N000011 HC RX IP 250 OP 636: Performed by: EMERGENCY MEDICINE

## 2022-02-18 RX ORDER — PROPOFOL 10 MG/ML
50 INJECTION, EMULSION INTRAVENOUS ONCE
Status: COMPLETED | OUTPATIENT
Start: 2022-02-18 | End: 2022-02-18

## 2022-02-18 RX ORDER — PROPOFOL 10 MG/ML
80 INJECTION, EMULSION INTRAVENOUS ONCE
Status: COMPLETED | OUTPATIENT
Start: 2022-02-18 | End: 2022-02-18

## 2022-02-18 RX ORDER — HYDROCODONE BITARTRATE AND ACETAMINOPHEN 5; 325 MG/1; MG/1
1 TABLET ORAL EVERY 6 HOURS PRN
Qty: 12 TABLET | Refills: 0 | Status: SHIPPED | OUTPATIENT
Start: 2022-02-18 | End: 2022-02-21

## 2022-02-18 RX ADMIN — PROPOFOL 40 MG: 10 INJECTION, EMULSION INTRAVENOUS at 17:30

## 2022-02-18 RX ADMIN — PROPOFOL 80 MG: 10 INJECTION, EMULSION INTRAVENOUS at 17:29

## 2022-02-18 RX ADMIN — HYDROMORPHONE HYDROCHLORIDE 0.5 MG: 1 INJECTION, SOLUTION INTRAMUSCULAR; INTRAVENOUS; SUBCUTANEOUS at 15:11

## 2022-02-18 RX ADMIN — HYDROMORPHONE HYDROCHLORIDE 0.5 MG: 1 INJECTION, SOLUTION INTRAMUSCULAR; INTRAVENOUS; SUBCUTANEOUS at 16:09

## 2022-02-18 NOTE — ED NOTES
Bed: JNED-09  Expected date: 2/18/22  Expected time: 2:14 PM  Means of arrival:   Comments:  Fall/shoulder deformity

## 2022-02-18 NOTE — ED TRIAGE NOTES
Pt arrived in ER via [facility]. Pt is a/ox4. Pt had an unwitnessed fall at home where she lives alone. Pt reports she tripped on a stool in her home landing on her left side. Pt reports she did have head strike on floor with no loss of consciousness. Pt denies blood thinners.  Pt reports left shoulder pain that increases with movement. Possible minor deformity noted.

## 2022-02-19 NOTE — PLAN OF CARE
Aranza presented with fx/dislocation of left proximal humerus. Glenohumeral joint successfully reduced by ED. OK to discharge home in a sling, KWESI GRUBER. Follow up with [facility] next week.    Mirza Cox MD   [facility]  2/18/2022

## 2022-02-19 NOTE — ED NOTES
Patient is back to baseline. Able to walk independently to the bathroom and use the bathroom independently. Tolerating fluids. Pain is controlled. Immobilizer in place.Son is here to bring her home.

## 2022-02-19 NOTE — DISCHARGE INSTRUCTIONS
You were seen in the emergency department at [facility] after a fall with a left shoulder injury.  Your evaluation reveals a broken bone in the left arm.  This was also dislocated when you arrived and we had to perform a sedation to put the bone back into the joint.  We discussed with our orthopedics team who would like to see you in clinic next week.  Please call the specialist at [facility] over the weekend or early next week to confirm an appointment within 1 to 2 weeks.  Please keep the shoulder immobilizer in place is much as possible, it is okay to cautiously take it off to clean and bathe.  You can continue taking Tylenol 650 mg every 6 hours and ibuprofen 600 mg every 6 hours for pain, substituting Norco for Tylenol for breakthrough severe discomfort at nighttime.  Avoid any weightbearing or activity with the left upper arm until seen by orthopedics.

## 2022-02-23 ENCOUNTER — TRANSFERRED RECORDS (OUTPATIENT)
Dept: HEALTH INFORMATION MANAGEMENT | Facility: CLINIC | Age: 82
End: 2022-02-23
Payer: COMMERCIAL

## 2022-02-24 ENCOUNTER — OFFICE VISIT (OUTPATIENT)
Dept: INTERNAL MEDICINE | Facility: CLINIC | Age: 82
End: 2022-02-24
Payer: COMMERCIAL

## 2022-02-24 ENCOUNTER — TRANSFERRED RECORDS (OUTPATIENT)
Dept: HEALTH INFORMATION MANAGEMENT | Facility: CLINIC | Age: 82
End: 2022-02-24

## 2022-02-24 DIAGNOSIS — F98.8 ATTENTION DEFICIT DISORDER (ADD) IN ADULT: ICD-10-CM

## 2022-02-24 DIAGNOSIS — I26.99 RIGHT PULMONARY EMBOLUS (H): ICD-10-CM

## 2022-02-24 DIAGNOSIS — E66.01 MORBID OBESITY (H): Primary | ICD-10-CM

## 2022-02-24 DIAGNOSIS — Z87.891 FORMER SMOKER, STOPPED SMOKING IN DISTANT PAST: Chronic | ICD-10-CM

## 2022-02-24 DIAGNOSIS — R20.2 PARESTHESIA OF LEFT FOOT: ICD-10-CM

## 2022-02-24 DIAGNOSIS — E78.00 HYPERCHOLESTEREMIA: ICD-10-CM

## 2022-02-24 DIAGNOSIS — S42.242A CLOSED 4-PART FRACTURE OF SURGICAL NECK OF LEFT HUMERUS, INITIAL ENCOUNTER: ICD-10-CM

## 2022-02-24 DIAGNOSIS — Z76.89 ENCOUNTER TO ESTABLISH CARE: ICD-10-CM

## 2022-02-24 DIAGNOSIS — I10 PRIMARY HYPERTENSION: Chronic | ICD-10-CM

## 2022-02-24 DIAGNOSIS — K21.9 CHRONIC GERD: ICD-10-CM

## 2022-02-24 DIAGNOSIS — J44.89 ASTHMA-COPD OVERLAP SYNDROME (H): ICD-10-CM

## 2022-02-24 PROCEDURE — 99215 OFFICE O/P EST HI 40 MIN: CPT | Mod: 25 | Performed by: INTERNAL MEDICINE

## 2022-02-24 RX ORDER — IBUPROFEN 600 MG/1
600 TABLET, FILM COATED ORAL DAILY
COMMUNITY
End: 2022-04-13

## 2022-02-24 RX ORDER — OXYCODONE HYDROCHLORIDE 10 MG/1
10 TABLET ORAL EVERY 8 HOURS PRN
COMMUNITY
Start: 2022-02-23 | End: 2022-04-13

## 2022-02-24 RX ORDER — BACLOFEN 10 MG/1
10 TABLET ORAL
COMMUNITY
Start: 2022-02-23 | End: 2022-04-13

## 2022-02-24 NOTE — PATIENT INSTRUCTIONS

## 2022-02-25 ENCOUNTER — TRANSFERRED RECORDS (OUTPATIENT)
Dept: HEALTH INFORMATION MANAGEMENT | Facility: CLINIC | Age: 82
End: 2022-02-25
Payer: COMMERCIAL

## 2022-02-28 VITALS — HEART RATE: 75 BPM | SYSTOLIC BLOOD PRESSURE: 136 MMHG | OXYGEN SATURATION: 98 % | DIASTOLIC BLOOD PRESSURE: 72 MMHG

## 2022-02-28 PROBLEM — I26.99 RIGHT PULMONARY EMBOLUS (H): Status: ACTIVE | Noted: 2020-01-06

## 2022-02-28 PROBLEM — E66.01 MORBID OBESITY (H): Status: ACTIVE | Noted: 2020-03-28

## 2022-03-01 PROBLEM — R20.2 PARESTHESIA OF LEFT FOOT: Status: ACTIVE | Noted: 2020-03-28

## 2022-03-01 PROBLEM — K21.9 CHRONIC GERD: Status: ACTIVE | Noted: 2020-03-28

## 2022-03-01 PROBLEM — F98.8 ATTENTION DEFICIT DISORDER (ADD) IN ADULT: Status: RESOLVED | Noted: 2020-03-28 | Resolved: 2022-03-01

## 2022-03-01 PROBLEM — M85.80 OSTEOPENIA: Status: RESOLVED | Noted: 2020-03-28 | Resolved: 2022-03-01

## 2022-03-01 PROBLEM — J44.89 ASTHMA-COPD OVERLAP SYNDROME (H): Status: ACTIVE | Noted: 2022-03-01

## 2022-03-01 PROBLEM — F98.8 ATTENTION DEFICIT DISORDER (ADD) IN ADULT: Status: ACTIVE | Noted: 2020-03-28

## 2022-03-01 PROBLEM — J45.21 MILD INTERMITTENT ASTHMA WITH EXACERBATION: Status: RESOLVED | Noted: 2020-01-06 | Resolved: 2022-03-01

## 2022-03-01 PROBLEM — K85.10 GALLSTONE PANCREATITIS: Status: RESOLVED | Noted: 2021-09-09 | Resolved: 2022-03-01

## 2022-03-01 PROBLEM — R73.03 PREDIABETES: Status: RESOLVED | Noted: 2020-03-28 | Resolved: 2022-03-01

## 2022-03-01 NOTE — PROGRESS NOTES
[facility] - Primary Care Specialists    Comprehensive and complex medical care - Chronic disease management - Shared decision making - Care coordination - Compassionate care    Patient advocacy - Rational deprescribing - Minimally disruptive medicine - Ethical focus - Customized care         Date of Service: 2/24/2022  Primary Provider: Agustin Okeefe    Patient Care Team:  Agustin Okeefe MD as PCP - General (Internal Medicine)  Piper Mercedes MD as Assigned PCP          Patient's Pharmacy:    [facility] #[id number] - [place], [place] - [address] [address]  [address] [address]  Phone: [phone] Fax: [phone]     Patient's Contacts:  Name Home Phone Work Phone Mobile Phone Relationship Lgl WHITNEY Vu [phone]   Other      Patient's Insurance:    Payor: [employer] / Plan: [employer] MEDICARE / Product Type: HMO /            Active Problem List:  Problem List as of 2/24/2022 Reviewed: 2/18/2022  2:29 PM by Marian Arnold       Other    Benign essential hypertension    Hypercholesteremia    Osteoarthritis    Acute blood loss anemia    Acute pulmonary embolism without acute cor pulmonale (H)    Mild intermittent asthma with exacerbation       Other    Chronic GERD    Gallstone pancreatitis    Osteopenia    Prediabetes    Paresthesia of left foot    Morbid exogenous obesity (H)    Attention deficit disorder (ADD) in adult           Current Outpatient Medications   Medication Instructions     atenolol (TENORMIN) 25 mg, Oral, EVERY MORNING     baclofen (LIORESAL) 10 mg     calcium 600 mg, DAILY     cholecalciferol 1,000 Units, DAILY     ibuprofen (ADVIL/MOTRIN) 600 mg, Oral, DAILY     lisinopril (ZESTRIL) 40 mg, Oral, EVERY MORNING     multivitamin therapeutic (THERAGRAN) tablet 1 tablet, EVERY MORNING     oxyCODONE IR (ROXICODONE) 10 mg, Oral, EVERY 8 HOURS PRN     pravastatin (PRAVACHOL) 40 mg, Oral, EVERY MORNING     PROAIR RESPICLICK 108 (90 Base) MCG/ACT  inhaler INHALE 1 PUFF BY MOUTH EVERY 4 HOURS AS NEEDED     QVAR REDIHALER 80 MCG/ACT inhaler INHALE 1 PUFF BY MOUTH 2 TIMES DAILY. DOESN'T NEED A SPACER OR SHAKING.     VENTOLIN  (90 Base) MCG/ACT inhaler INHALE 2 PUFFS INTO THE LUNGS EVERY 6 HOURS        Social History     Social History Narrative    3 childen, 2 sons and 1 daughter.        Previous long term patient of Dr. Piper Greene.       Subjective:     Aranza Mcmanus is a 81 year old female who comes in today for:    Chief Complaint   Patient presents with     Establish Care     Appointment     sees a pulmanologist and has an appt in [facility] F/U     fall broke arm is being seen at [facility]     Recheck Medication     should take ibuprofen       Patient comes in today to establish care.    She is a long term patient of Dr. Piper Greene.  She recently retired.    She would like to establish care with internal medicine.    She has had recent problems with a fall and fractured left humerus.  Apparently she has a four-part fracture of the humerus.  She is seeing Dr. Alcides Clark at [facility].  He is ordering a CT scan and it may likely require surgical repair.  This is pending at this time.  She did slip on ice and fell breaking the arm.    We reviewed her asthma and likely COPD.  She has a remote history of smoking.  She has evidence of emphysematous changes on previous CT scans.  I have limited information related to pulmonary function testing.  She has been on inhalers for quite a while.  She has not had any recent issues but sees pulmonary here in the near future to review her care.    She has been to [facility] in the past related to her general health and we will request the records.    She had pulmonary embolism to the right lower lobe in 2020.  She is no longer on anticoagulation.  This was provoked.    She has high blood pressure but denies any significant lower extremity edema.  She denies any  chest pain or shortness of breath.    She has hyperlipidemia and is on medications for this.    We reviewed her other issues noted in the assessment but not specifically addressed in the HPI above.     Objective:     Wt Readings from Last 3 Encounters:   02/18/22 90.7 kg (200 lb)   12/13/21 90.9 kg (200 lb 6 oz)   09/08/21 92.2 kg (203 lb 4 oz)     BP Readings from Last 3 Encounters:   02/28/22 136/72   02/18/22 (!) 142/70   12/13/21 (!) 142/80     /72   Pulse 75   SpO2 98%    The patient is comfortable, no acute distress.  Mood good.  Insight good.  Eyes are nonicteric.  Neck is supple without mass.  No cervical adenopathy.  No thyromegaly. Heart regular rate and rhythm.  Lungs clear to auscultation bilaterally.  Respiratory effort is good.  Abdomen soft and nontender.  No hepatosplenomegaly.  Extremities no edema.  Her left arm is in a sling.      Diagnostics:     Hospital Outpatient Visit on 01/10/2022   Component Date Value Ref Range Status     Pharmacologic Protocol 01/10/2022 Lexiscan   Final     Test Type 01/10/2022 Pharmacological   Final     Baseline HR 01/10/2022 58   Final     Baseline Systolic BP 01/10/2022 149   Final     Baseline Diastolic BP 01/10/2022 72   Final     Last Stress HR 01/10/2022 77   Final     Last Stress Systolic BP 01/10/2022 136   Final     Last Stress Diastolic BP 01/10/2022 80   Final     Target HR 01/10/2022 141   Final     PERCENT HR 01/10/2022 85%   Final     ST Deviation Elevation 01/10/2022 III 0.1mm   Final     Deviation Time 01/10/2022 I -0.3mm   Final     ST Elevation Amount 01/10/2022 I 0.5mm   Final     ST Deviation Amount he 01/10/2022 aVR -0.6mm   Final     Final Resting BP 01/10/2022 135/64   Final     Final Resting HR 01/10/2022 75   Final     Max Treadmill Speed 01/10/2022 0.0   Final     Max Treadmill Grade 01/10/2022 0.0   Final     Peak Systolic BP 01/10/2022 151/75   Final     Peak Diastolic BP 01/10/2022 136/80   Final     Max HR  01/10/2022 85   Final      Stress Phase 01/10/2022 Resting   Final     Stress Resting Pt Position 01/10/2022 MANUAL EVENT   Final     Current HR 01/10/2022 58   Final     Current BP 01/10/2022 149/72   Final     Stress Phase 01/10/2022 Stress   Final     Stage Minute 01/10/2022 EXE 00:00   Final     Exercise Stage 01/10/2022 STAGE 2   Final     Current HR 01/10/2022 59   Final     Current BP 01/10/2022 149/72   Final     Stress Phase 01/10/2022 Stress   Final     Stage Minute 01/10/2022 EXE 01:00   Final     Exercise Stage 01/10/2022 STAGE 3   Final     Current HR 01/10/2022 58   Final     Current BP 01/10/2022 149/72   Final     Stress Phase 01/10/2022 Stress   Final     Stage Minute 01/10/2022 EXE 01:58   Final     Exercise Stage 01/10/2022 STAGE 3   Final     Current HR 01/10/2022 85   Final     Current BP 01/10/2022 151/75   Final     Stress Phase 01/10/2022 Stress   Final     Stage Minute 01/10/2022 EXE 02:00   Final     Exercise Stage 01/10/2022 STAGE 4   Final     Current HR 01/10/2022 84   Final     Current BP 01/10/2022 151/75   Final     Stress Phase 01/10/2022 Stress   Final     Stage Minute 01/10/2022 EXE 03:00   Final     Exercise Stage 01/10/2022 STAGE 5   Final     Current HR 01/10/2022 82   Final     Current BP 01/10/2022 151/75   Final     Stress Phase 01/10/2022 Stress   Final     Stage Minute 01/10/2022 EXE 03:03   Final     Exercise Stage 01/10/2022 STAGE 5   Final     Current HR 01/10/2022 82   Final     Current BP 01/10/2022 136/80   Final     Stress Phase 01/10/2022 Stress   Final     Stage Minute 01/10/2022 EXE 04:00   Final     Exercise Stage 01/10/2022 STAGE 6   Final     Current HR 01/10/2022 77   Final     Current BP 01/10/2022 136/80   Final     Stress Phase 01/10/2022 Stress   Final     Stage Minute 01/10/2022 EXE 04:00   Final     Exercise Stage 01/10/2022 STAGE 6   Final     Current HR 01/10/2022 77   Final     Current BP 01/10/2022 136/80   Final     Stress Phase 01/10/2022 Recovery   Final     Stage  Minute 01/10/2022 REC 00:55   Final     Exercise Stage 01/10/2022 Recovery   Final     Current HR 01/10/2022 77   Final     Current BP 01/10/2022 150/64   Final     Stress Phase 01/10/2022 Recovery   Final     Stage Minute 01/10/2022 REC 00:59   Final     Exercise Stage 01/10/2022 Recovery   Final     Current HR 01/10/2022 77   Final     Current BP 01/10/2022 150/64   Final     Stress Phase 01/10/2022 Recovery   Final     Stage Minute 01/10/2022 REC 01:59   Final     Exercise Stage 01/10/2022 Recovery   Final     Current HR 01/10/2022 72   Final     Current BP 01/10/2022 150/64   Final     Stress Phase 01/10/2022 Recovery   Final     Stage Minute 01/10/2022 REC 02:22   Final     Exercise Stage 01/10/2022 Recovery   Final     Current HR 01/10/2022 75   Final     Current BP 01/10/2022 135/64   Final     Stress Phase 01/10/2022 Recovery   Final     Stage Minute 01/10/2022 REC 02:59   Final     Exercise Stage 01/10/2022 Recovery   Final     Current HR 01/10/2022 76   Final     Current BP 01/10/2022 135/64   Final     Stress Phase 01/10/2022 Recovery   Final     Stage Minute 01/10/2022 REC 03:01   Final     Exercise Stage 01/10/2022 Recovery   Final     Current HR 01/10/2022 75   Final     Current BP 01/10/2022 135/64   Final     Max Predicted HR  01/10/2022 60  % Final     Rate Pressure Product 01/10/2022 11,560.0   Final       No results found for any visits on 02/24/22.     Assessment and Plan:     1. Morbid obesity (H)  Work on weight loss.  Continue to monitor.    2. Right pulmonary embolus (H)  No signs of recurrence.  Continue to monitor.  Age and mobility are her main risk for recurrence.    3. Attention deficit disorder (ADD) in adult  No obvious issues with this at this time.    4. Primary hypertension  Continue current medications.    5. Former smoker, stopped smoking in distant past  May have contributed to his COPD changes.    6. Paresthesia of left foot  No issues at this point.    7. Asthma-COPD overlap  syndrome (H)  Follow-up with pulmonary as scheduled.  Reassess as needed.  Continue current inhalers.    8. Closed 4-part fracture of surgical neck of left humerus, initial encounter  Follow-up for [facility] related to this.    9. Encounter to establish care    10. Hypercholesteremia  Continue current medications at this time.  Reviewed blood work as appropriate.    11. Chronic GERD     60 minutes or greater was spent today on the patient's care on the day of service.    This includes time for chart preparation, reviewing medical tests done before or during the visit, talking with the patient, review of quality indicators, required documentation, and other elements of care.     Continue current medications otherwise.  Follow up sooner if issues.        Agustin Okeefe MD  General Internal Medicine  [facility]      Return in about 2 months (around 4/24/2022), or if symptoms worsen or fail to improve, for preoperative evaluation.     Future Appointments   Date Time Provider Department Center   4/13/2022  1:00 PM [facility] PFT RM 1 [facility]   4/13/2022  2:00 PM [facility]         HCC issues resolved at this visit.

## 2022-03-02 ENCOUNTER — TELEPHONE (OUTPATIENT)
Dept: FAMILY MEDICINE | Facility: CLINIC | Age: 82
End: 2022-03-02
Payer: COMMERCIAL

## 2022-03-02 NOTE — TELEPHONE ENCOUNTER
Call placed to patient  No answer; generic voicemail left requesting call back to Clinic RN at [phone]    Ian Gilman RN

## 2022-03-02 NOTE — TELEPHONE ENCOUNTER
Reason for Call:  Other call back    Detailed comments: Patient is requesting a call back from Dr Mercedes, would not explain what this was about, please advise.    Phone Number Patient can be reached at: Home number on file [phone] (home)    Best Time: any    Can we leave a detailed message on this number? YES    Call taken on 3/2/2022 at 3:55 PM by Jenna Kilgore

## 2022-03-03 NOTE — TELEPHONE ENCOUNTER
Call placed to patient    Patient states she was attempting to reach Dr. Mercedes to let her know that she has switched to another PCP who is closer to her home    Patient wanted Dr. Mercedes to know that the change had nothing to do with the care provided and that she really appreciates everything Dr. Mercedes had done for her  States the change was for her convenience     Ian Gilman RN

## 2022-03-14 ENCOUNTER — TRANSFERRED RECORDS (OUTPATIENT)
Dept: HEALTH INFORMATION MANAGEMENT | Facility: CLINIC | Age: 82
End: 2022-03-14
Payer: COMMERCIAL

## 2022-04-04 ENCOUNTER — TRANSFERRED RECORDS (OUTPATIENT)
Dept: HEALTH INFORMATION MANAGEMENT | Facility: CLINIC | Age: 82
End: 2022-04-04
Payer: COMMERCIAL

## 2022-04-13 ENCOUNTER — ALLIED HEALTH/NURSE VISIT (OUTPATIENT)
Dept: PULMONOLOGY | Facility: OTHER | Age: 82
End: 2022-04-13
Payer: COMMERCIAL

## 2022-04-13 ENCOUNTER — OFFICE VISIT (OUTPATIENT)
Dept: PULMONOLOGY | Facility: OTHER | Age: 82
End: 2022-04-13
Payer: COMMERCIAL

## 2022-04-13 VITALS
HEIGHT: 65 IN | BODY MASS INDEX: 33.66 KG/M2 | SYSTOLIC BLOOD PRESSURE: 122 MMHG | HEART RATE: 66 BPM | WEIGHT: 202 LBS | RESPIRATION RATE: 12 BRPM | OXYGEN SATURATION: 96 % | DIASTOLIC BLOOD PRESSURE: 70 MMHG

## 2022-04-13 DIAGNOSIS — J45.909 ASTHMA: ICD-10-CM

## 2022-04-13 DIAGNOSIS — J44.89 ASTHMA-COPD OVERLAP SYNDROME (H): Primary | ICD-10-CM

## 2022-04-13 LAB
DLCOCOR-%PRED-PRE: 54 %
DLCOCOR-PRE: 10.59 ML/MIN/MMHG
DLCOUNC-%PRED-PRE: 55 %
DLCOUNC-PRE: 10.78 ML/MIN/MMHG
DLCOUNC-PRED: 19.5 ML/MIN/MMHG
ERV-%PRED-PRE: 166 %
ERV-PRE: 0.52 L
ERV-PRED: 0.31 L
EXPTIME-PRE: 7.75 SEC
FEF2575-%PRED-POST: 86 %
FEF2575-%PRED-PRE: 53 %
FEF2575-POST: 1.4 L/SEC
FEF2575-PRE: 0.87 L/SEC
FEF2575-PRED: 1.62 L/SEC
FEFMAX-%PRED-PRE: 79 %
FEFMAX-PRE: 4.03 L/SEC
FEFMAX-PRED: 5.04 L/SEC
FEV1-%PRED-PRE: 78 %
FEV1-PRE: 1.59 L
FEV1FEV6-PRE: 67 %
FEV1FEV6-PRED: 78 %
FEV1FVC-PRE: 66 %
FEV1FVC-PRED: 77 %
FEV1SVC-PRE: 65 %
FEV1SVC-PRED: 67 %
FIFMAX-PRE: 4.2 L/SEC
FRCPLETH-%PRED-PRE: 113 %
FRCPLETH-PRE: 3.15 L
FRCPLETH-PRED: 2.78 L
FVC-%PRED-PRE: 90 %
FVC-PRE: 2.41 L
FVC-PRED: 2.66 L
HGB BLD-MCNC: 14 G/DL
IC-%PRED-PRE: 65 %
IC-PRE: 1.77 L
IC-PRED: 2.68 L
RVPLETH-%PRED-PRE: 109 %
RVPLETH-PRE: 2.47 L
RVPLETH-PRED: 2.25 L
TLCPLETH-%PRED-PRE: 96 %
TLCPLETH-PRE: 4.92 L
TLCPLETH-PRED: 5.11 L
VA-%PRED-PRE: 75 %
VA-PRE: 3.62 L
VC-%PRED-PRE: 81 %
VC-PRE: 2.46 L
VC-PRED: 3 L

## 2022-04-13 PROCEDURE — 94729 DIFFUSING CAPACITY: CPT | Performed by: INTERNAL MEDICINE

## 2022-04-13 PROCEDURE — 94060 EVALUATION OF WHEEZING: CPT | Performed by: INTERNAL MEDICINE

## 2022-04-13 PROCEDURE — 99204 OFFICE O/P NEW MOD 45 MIN: CPT | Mod: 25 | Performed by: INTERNAL MEDICINE

## 2022-04-13 PROCEDURE — 85018 HEMOGLOBIN: CPT

## 2022-04-13 PROCEDURE — 94726 PLETHYSMOGRAPHY LUNG VOLUMES: CPT | Performed by: INTERNAL MEDICINE

## 2022-04-13 ASSESSMENT — ASTHMA QUESTIONNAIRES
QUESTION_5 LAST FOUR WEEKS HOW WOULD YOU RATE YOUR ASTHMA CONTROL: WELL CONTROLLED
QUESTION_1 LAST FOUR WEEKS HOW MUCH OF THE TIME DID YOUR ASTHMA KEEP YOU FROM GETTING AS MUCH DONE AT WORK, SCHOOL OR AT HOME: NONE OF THE TIME
QUESTION_2 LAST FOUR WEEKS HOW OFTEN HAVE YOU HAD SHORTNESS OF BREATH: THREE TO SIX TIMES A WEEK
QUESTION_4 LAST FOUR WEEKS HOW OFTEN HAVE YOU USED YOUR RESCUE INHALER OR NEBULIZER MEDICATION (SUCH AS ALBUTEROL): NOT AT ALL
QUESTION_3 LAST FOUR WEEKS HOW OFTEN DID YOUR ASTHMA SYMPTOMS (WHEEZING, COUGHING, SHORTNESS OF BREATH, CHEST TIGHTNESS OR PAIN) WAKE YOU UP AT NIGHT OR EARLIER THAN USUAL IN THE MORNING: NOT AT ALL
ACT_TOTALSCORE: 22
ACT_TOTALSCORE: 22

## 2022-04-13 NOTE — LETTER
4/13/2022         RE: Aranza Mcmanus  [address]  [place] [address]        Dear Colleague,    Thank you for referring your patient, Aranza Mcmanus, to the [facility]. Please see a copy of my visit note below.    Pulmonary Clinic Outpatient Consultation    Assessment and Plan:   81 year old lady with a history of asthma/reactive airway disease - evaluated at [facility] many years ago, records not available to me, obesity, HTN, HLD, former minimal smoking history, hx of small PE in 2020 treated with NOAC therapy, presents for asthma/COPD evaluation. PFTs showed normal spirometry with borderline reduced FEV1/FVC ratio, positive response to bronchodilator, evidence of small airways disease as well as moderately impaired diffusing capacity for CO. The latter is likely due second hand some exposure and emphysema, which was confirmed on her CT scan from 2020. She probably has some asthma/COPD overlap features given the bronchodilator responsiveness and borderline reduction in FEV1/FVC ratio.  Her symptoms appear to be well controlled on current inhaler regimen and her exercise tolerance appears to be quite good, so I would not make any changes to her regimen today.     Recommendations:  - continue the Qvar 1 puff bid. Rinse/gargle/spit after use.  - continue albuterol rescue inhaler/neb as needed  - encouraged her to exercise and remain active. Weight loss as able  - gave her some information on pulmonary rehab program. She will call if she wants to enroll.  - UTD with flu, pneumococcal and COVID-19 vaccination + booster. Recommend flu shot each Fall.    Follow up in 1 year or sooner if needed.    Manish (Anup Morales MD  [facility]  Clinic [phone]  Fax [phone]      CCx: asthma/COPD evaluation    HPI: 81 year old lady with a history of asthma/reactive airway disease - evaluated at [facility] many years ago,  records not available to me, obesity, HTN, HLD, former minimal smoking history, hx of small PE in 2020 treated with NOAC therapy, presents for asthma/COPD evaluation.  She is currently on Qvar and albuterol as needed. She feels the current inhaler regimen is quite good.  No cough. She has good exercise tolerance and goes for walks frequently. No real breathing limitation.  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       ROS:  A 12-system review was obtained and was negative with the exception of the symptoms endorsed in the history of present illness.    PMH:  Past Medical History:   Diagnosis Date     Acute blood loss anemia 2/18/2015     Arthritis      Asthma      Asthma-COPD overlap syndrome (H) 3/1/2022     Attention deficit disorder (ADD) in adult 3/28/2020     Chronic GERD 3/28/2020     Former smoker, stopped smoking in distant past      Gallstone pancreatitis 9/9/2021     HTN (hypertension)      Hypercholesteremia      Hypertension      Mild intermittent asthma with exacerbation 1/6/2020     Osteopenia 3/28/2020     Right pulmonary embolus (H) 1/6/2020    EXAM: CTA CHEST PE RUN  LOCATION: [facility]  DATE/TIME: 1/6/2020 1:43 AM   INDICATION: Pleuritic right lower chest pain. Right upper quadrant pain.  COMPARISON: None.  TECHNIQUE: CT angiogram chest during arterial phase injection IV contrast. 2D and 3D MIP reconstructions were performed by the CT technologist. Dose reduction techniques were used.  CONTRAST: Iohexol (Omni) 100 mL   FINDIN       PSH:  Past Surgical History:   Procedure Laterality Date     ANKLE SURGERY Left      CHOLECYSTECTOMY  2020     FOOT SURGERY Left      HYSTERECTOMY  01/01/1998     TONSILLECTOMY & ADENOIDECTOMY Bilateral        Allergies:  Allergies   Allergen Reactions     Mold      Other reaction(s): Runny Nose, Wheezing     Morphine      Other  "reaction(s): Hallucinations     Oxycodone Other (See Comments)     Too strong.      Perfume      Other reaction(s): Runny Nose, Wheezing     Tobacco [Nicotiana Tabacum]      Other reaction(s): Runny Nose, Wheezing    \"Smoke\"     Valacyclovir Unknown and Hives     Azithromycin Nausea and Vomiting and Rash     And fever reported.       Family HX:  Family History   Problem Relation Age of Onset     Crohn's Disease Mother      Hypertension Mother      Kidney Cancer Father      Lung Cancer Sister      Cancer Brother         Metastatic parotid gland tumor     Coronary Artery Disease Brother      Dementia Brother      Breast Cancer Maternal Grandmother 30        bilateral mastectomy done yrs ago.     Colon Cancer Paternal Grandmother      Atrial fibrillation Son      Cardiomyopathy Son      Coronary Artery Disease Son      No Known Problems Son      No Known Problems Daughter        Social Hx:  Social History     Socioeconomic History     Marital status: Single     Spouse name: Not on file     Number of children: Not on file     Years of education: Not on file     Highest education level: Not on file   Occupational History     Not on file   Tobacco Use     Smoking status: Former Smoker     Packs/day: 1.00     Years: 6.00     Pack years: 6.00     Quit date: 1975     Years since quittin.3     Smokeless tobacco: Never Used   Substance and Sexual Activity     Alcohol use: Yes     Alcohol/week: 0.8 standard drinks     Comment: Alcoholic Drinks/day: occ.     Drug use: Not Currently     Types: Marijuana     Sexual activity: Not on file   Other Topics Concern     Not on file   Social History Narrative    3 childen, 2 sons and 1 daughter.        Previous long term patient of Dr. Piper Greene.     Social Determinants of Health     Financial Resource Strain: Not on file   Food Insecurity: Not on file   Transportation Needs: Not on file   Physical Activity: Not on file   Stress: Not on file   Social Connections: Not on " "file   Intimate Partner Violence: Not on file   Housing Stability: Not on file       Current Meds:  Current Outpatient Medications   Medication Sig Dispense Refill     atenolol (TENORMIN) 25 MG tablet Take 1 tablet (25 mg) by mouth every morning 90 tablet 2     calcium carbonate (OS-STERLING) 600 mg calcium (1,500 mg) tablet [CALCIUM CARBONATE (OS-STERLING) 600 MG CALCIUM (1,500 MG) TABLET] Take 600 mg by mouth daily.       cholecalciferol, vitamin D3, 1,000 unit (25 mcg) tablet [CHOLECALCIFEROL, VITAMIN D3, 1,000 UNIT (25 MCG) TABLET] Take 1,000 Units by mouth daily.       lisinopril (ZESTRIL) 40 MG tablet Take 1 tablet (40 mg) by mouth every morning 90 tablet 2     multivitamin therapeutic (THERAGRAN) tablet [MULTIVITAMIN THERAPEUTIC (THERAGRAN) TABLET] Take 1 tablet by mouth every morning.       pravastatin (PRAVACHOL) 40 MG tablet Take 1 tablet (40 mg) by mouth every morning 90 tablet 2     PROAIR RESPICLICK 108 (90 Base) MCG/ACT inhaler INHALE 1 PUFF BY MOUTH EVERY 4 HOURS AS NEEDED 1 each 6     QVAR REDIHALER 80 MCG/ACT inhaler INHALE 1 PUFF BY MOUTH 2 TIMES DAILY. DOESN'T NEED A SPACER OR SHAKING. 21.2 g 3       Physical Exam:  /70 (BP Location: Left arm, Patient Position: Chair, Cuff Size: Adult Regular)   Pulse 66   Resp 12   Ht 1.651 m (5' 5\")   Wt 91.6 kg (202 lb)   SpO2 96%   BMI 33.61 kg/m    Gen: awake, alert, oriented, no distress  HEENT: nasal turbinates are unremarkable, no oropharyngeal lesions, no cervical or supraclavicular lymphadenopathy  CV: RRR, no M/G/R  Resp: mild bibasilar crackles, no wheezing or rhonchi. Good air movement.   Skin: no apparent rashes  Ext: no cyanosis, clubbing or edema  Neuro: alert, nonfocal    Labs:  Reviewed  Chem panel OK  CBC normal, no diff.  hgb 14.0    Imaging studies:  CTA chest from 2020:  IMPRESSION:  1.  Pulmonary emboli right lower lobe.  2.  Small clot burden.  3.  No evidence of heart strain.  4.  Trace right basilar effusion.  5.  Normal right basilar " atelectasis.  6.  No aneurysm or dissection involving the thoracic aorta    Pulmonary Function Testing  4/13/2022  FEV1 1.59L, 78% (> LLN)  FVC 90%  +BD response  Ratio 0.66 (> LLN)  FEF 25-75% 53% with +BD response  %  DLco 54% roseline for hgb.  Flow volume loop suggests small airways disease.       Again, thank you for allowing me to participate in the care of your patient.        Sincerely,        Manish Morales MD

## 2022-04-13 NOTE — PROGRESS NOTES
Pulmonary Clinic Outpatient Consultation    Assessment and Plan:   81 year old lady with a history of asthma/reactive airway disease - evaluated at [facility] many years ago, records not available to me, obesity, HTN, HLD, former minimal smoking history, hx of small PE in 2020 treated with NOAC therapy, presents for asthma/COPD evaluation. PFTs showed normal spirometry with borderline reduced FEV1/FVC ratio, positive response to bronchodilator, evidence of small airways disease as well as moderately impaired diffusing capacity for CO. The latter is likely due second hand some exposure and emphysema, which was confirmed on her CT scan from 2020. She probably has some asthma/COPD overlap features given the bronchodilator responsiveness and borderline reduction in FEV1/FVC ratio.  Her symptoms appear to be well controlled on current inhaler regimen and her exercise tolerance appears to be quite good, so I would not make any changes to her regimen today.     Recommendations:  - continue the Qvar 1 puff bid. Rinse/gargle/spit after use.  - continue albuterol rescue inhaler/neb as needed  - encouraged her to exercise and remain active. Weight loss as able  - gave her some information on pulmonary rehab program. She will call if she wants to enroll.  - UTD with flu, pneumococcal and COVID-19 vaccination + booster. Recommend flu shot each Fall.    Follow up in 1 year or sooner if needed.    Manish (Anup Morales MD  [facility]  Clinic [phone]  Fax [phone]      CCx: asthma/COPD evaluation    HPI: 81 year old lady with a history of asthma/reactive airway disease - evaluated at [facility] many years ago, records not available to me, obesity, HTN, HLD, former minimal smoking history, hx of small PE in 2020 treated with NOAC therapy, presents for asthma/COPD evaluation.  She is currently on Qvar and albuterol as needed. She feels the current inhaler regimen is  "quite good.  No cough. She has good exercise tolerance and goes for walks frequently. No real breathing limitation.  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       ROS:  A 12-system review was obtained and was negative with the exception of the symptoms endorsed in the history of present illness.    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 Right upper quadrant pain.  COMPARISON: None.  TECHNIQUE: CT angiogram chest during arterial phase injection IV contrast. 2D and 3D MIP reconstructions were performed by the CT technologist. Dose reduction techniques were used.  CONTRAST: Iohexol (Omni) 100 mL   FINDIN       PSH:  Past Surgical History:   Procedure Laterality Date     ANKLE SURGERY Left      CHOLECYSTECTOMY  2020     FOOT SURGERY Left      HYSTERECTOMY  01/01/1998     TONSILLECTOMY & ADENOIDECTOMY Bilateral        Allergies:  Allergies   Allergen Reactions     Mold      Other reaction(s): Runny Nose, Wheezing     Morphine      Other reaction(s): Hallucinations     Oxycodone Other (See Comments)     Too strong.      Perfume      Other reaction(s): Runny Nose, Wheezing     Tobacco [Nicotiana Tabacum]      Other reaction(s): Runny Nose, Wheezing    \"Smoke\"     Valacyclovir Unknown and Hives     " Azithromycin Nausea and Vomiting and Rash     And fever reported.       Family HX:  Family History   Problem Relation Age of Onset     Crohn's Disease Mother      Hypertension Mother      Kidney Cancer Father      Lung Cancer Sister      Cancer Brother         Metastatic parotid gland tumor     Coronary Artery Disease Brother      Dementia Brother      Breast Cancer Maternal Grandmother 30        bilateral mastectomy done yrs ago.     Colon Cancer Paternal Grandmother      Atrial fibrillation Son      Cardiomyopathy Son      Coronary Artery Disease Son      No Known Problems Son      No Known Problems Daughter        Social Hx:  Social History     Socioeconomic History     Marital status: Single     Spouse name: Not on file     Number of children: Not on file     Years of education: Not on file     Highest education level: Not on file   Occupational History     Not on file   Tobacco Use     Smoking status: Former Smoker     Packs/day: 1.00     Years: 6.00     Pack years: 6.00     Quit date: 1975     Years since quittin.3     Smokeless tobacco: Never Used   Substance and Sexual Activity     Alcohol use: Yes     Alcohol/week: 0.8 standard drinks     Comment: Alcoholic Drinks/day: occ.     Drug use: Not Currently     Types: Marijuana     Sexual activity: Not on file   Other Topics Concern     Not on file   Social History Narrative    3 childen, 2 sons and 1 daughter.        Previous long term patient of Dr. Piper Greene.     Social Determinants of Health     Financial Resource Strain: Not on file   Food Insecurity: Not on file   Transportation Needs: Not on file   Physical Activity: Not on file   Stress: Not on file   Social Connections: Not on file   Intimate Partner Violence: Not on file   Housing Stability: Not on file       Current Meds:  Current Outpatient Medications   Medication Sig Dispense Refill     atenolol (TENORMIN) 25 MG tablet Take 1 tablet (25 mg) by mouth every morning 90 tablet 2      "calcium carbonate (OS-STERLING) 600 mg calcium (1,500 mg) tablet [CALCIUM CARBONATE (OS-STERLING) 600 MG CALCIUM (1,500 MG) TABLET] Take 600 mg by mouth daily.       cholecalciferol, vitamin D3, 1,000 unit (25 mcg) tablet [CHOLECALCIFEROL, VITAMIN D3, 1,000 UNIT (25 MCG) TABLET] Take 1,000 Units by mouth daily.       lisinopril (ZESTRIL) 40 MG tablet Take 1 tablet (40 mg) by mouth every morning 90 tablet 2     multivitamin therapeutic (THERAGRAN) tablet [MULTIVITAMIN THERAPEUTIC (THERAGRAN) TABLET] Take 1 tablet by mouth every morning.       pravastatin (PRAVACHOL) 40 MG tablet Take 1 tablet (40 mg) by mouth every morning 90 tablet 2     PROAIR RESPICLICK 108 (90 Base) MCG/ACT inhaler INHALE 1 PUFF BY MOUTH EVERY 4 HOURS AS NEEDED 1 each 6     QVAR REDIHALER 80 MCG/ACT inhaler INHALE 1 PUFF BY MOUTH 2 TIMES DAILY. DOESN'T NEED A SPACER OR SHAKING. 21.2 g 3       Physical Exam:  /70 (BP Location: Left arm, Patient Position: Chair, Cuff Size: Adult Regular)   Pulse 66   Resp 12   Ht 1.651 m (5' 5\")   Wt 91.6 kg (202 lb)   SpO2 96%   BMI 33.61 kg/m    Gen: awake, alert, oriented, no distress  HEENT: nasal turbinates are unremarkable, no oropharyngeal lesions, no cervical or supraclavicular lymphadenopathy  CV: RRR, no M/G/R  Resp: mild bibasilar crackles, no wheezing or rhonchi. Good air movement.   Skin: no apparent rashes  Ext: no cyanosis, clubbing or edema  Neuro: alert, nonfocal    Labs:  Reviewed  Chem panel OK  CBC normal, no diff.  hgb 14.0    Imaging studies:  CTA chest from 2020:  IMPRESSION:  1.  Pulmonary emboli right lower lobe.  2.  Small clot burden.  3.  No evidence of heart strain.  4.  Trace right basilar effusion.  5.  Normal right basilar atelectasis.  6.  No aneurysm or dissection involving the thoracic aorta    Pulmonary Function Testing  4/13/2022  FEV1 1.59L, 78% (> LLN)  FVC 90%  +BD response  Ratio 0.66 (> LLN)  FEF 25-75% 53% with +BD response  %  DLco 54% roseline for hgb.  Flow volume " loop suggests small airways disease.    Speaking Coherently

## 2022-05-04 ENCOUNTER — TRANSFERRED RECORDS (OUTPATIENT)
Dept: HEALTH INFORMATION MANAGEMENT | Facility: CLINIC | Age: 82
End: 2022-05-04
Payer: COMMERCIAL

## 2022-06-23 ENCOUNTER — HOSPITAL ENCOUNTER (OUTPATIENT)
Dept: GENERAL RADIOLOGY | Facility: HOSPITAL | Age: 82
Discharge: HOME | End: 2022-06-23
Attending: INTERNAL MEDICINE | Admitting: INTERNAL MEDICINE
Payer: COMMERCIAL

## 2022-06-23 ENCOUNTER — OFFICE VISIT (OUTPATIENT)
Dept: INTERNAL MEDICINE | Facility: CLINIC | Age: 82
End: 2022-06-23
Payer: COMMERCIAL

## 2022-06-23 VITALS
SYSTOLIC BLOOD PRESSURE: 126 MMHG | DIASTOLIC BLOOD PRESSURE: 72 MMHG | RESPIRATION RATE: 18 BRPM | BODY MASS INDEX: 33.61 KG/M2 | WEIGHT: 202 LBS

## 2022-06-23 DIAGNOSIS — R07.9 LEFT-SIDED CHEST PAIN: ICD-10-CM

## 2022-06-23 DIAGNOSIS — Z85.828 HISTORY OF SKIN CANCER: ICD-10-CM

## 2022-06-23 DIAGNOSIS — E78.00 HYPERCHOLESTEREMIA: ICD-10-CM

## 2022-06-23 DIAGNOSIS — R20.0 LEFT LEG NUMBNESS: ICD-10-CM

## 2022-06-23 DIAGNOSIS — J44.89 ASTHMA-COPD OVERLAP SYNDROME (H): ICD-10-CM

## 2022-06-23 DIAGNOSIS — K06.9 GINGIVAL DISEASE: Primary | ICD-10-CM

## 2022-06-23 DIAGNOSIS — R07.89 CHEST WALL PAIN: ICD-10-CM

## 2022-06-23 DIAGNOSIS — I10 PRIMARY HYPERTENSION: Chronic | ICD-10-CM

## 2022-06-23 DIAGNOSIS — S42.242A CLOSED 4-PART FRACTURE OF SURGICAL NECK OF LEFT HUMERUS, INITIAL ENCOUNTER: ICD-10-CM

## 2022-06-23 PROCEDURE — 71101 X-RAY EXAM UNILAT RIBS/CHEST: CPT | Mod: LT,FY

## 2022-06-23 PROCEDURE — 99215 OFFICE O/P EST HI 40 MIN: CPT | Performed by: INTERNAL MEDICINE

## 2022-06-23 RX ORDER — DOXYCYCLINE 100 MG/1
100 CAPSULE ORAL 2 TIMES DAILY
Qty: 14 CAPSULE | Refills: 0 | Status: SHIPPED | OUTPATIENT
Start: 2022-06-23 | End: 2022-06-30

## 2022-06-23 NOTE — PATIENT INSTRUCTIONS
To schedule an appointment with a dermatologist, I recommend calling [facility] at  [phone].    They have multiple offices in the following locations:    [address], [facility] [address]  [place] [address]    [place]  [address], [address]  [place] [address]    [place] [address] [place] [address]    [place]  [address], [address]  [place] [address]    Please have them send me copies of your reports from your visit.   ______________________________________________________________________     Future Appointments   Date Time Provider Department Center   12/8/2022 12:30 PM Agustin Okeefe MD [facility]

## 2022-06-24 NOTE — PROGRESS NOTES
[facility]    Comprehensive and complex medical care - Chronic disease management - Shared decision making - Care coordination - Compassionate care    Patient advocacy - Rational deprescribing - Minimally disruptive medicine - Ethical focus - Customized care         Date of Service: 6/23/2022  Primary Provider: Agutsin Okeefe    Patient Care Team:  Agustin Okeefe MD as PCP - General (Internal Medicine)  Piper Mercedes MD as Assigned PCP          Patient's Pharmacy:    [facility]/pharm[facility] #[id number] - [place], [place] - [address] [address]  [address] [address]  Phone: [phone] Fax: [phone]     Patient's Contacts:  Name Home Phone Work Phone Mobile Phone Relationship Lgl WHITNEY Vu [phone]   Other      Patient's Insurance:    Payor: "[employer] / Plan: [employer] MEDICARE / Product Type: HMO /            Active Problem List:  Problem List as of 6/23/2022 Reviewed: 2/18/2022  2:29 PM by Marian Arnold    Former smoker, stopped smoking in distant past    Right pulmonary embolus (H)       Medium    HTN (hypertension)    Asthma-COPD overlap syndrome (H)       Low    Hypercholesteremia    Chronic GERD    Paresthesia of left foot    Morbid obesity (H)       Other    Osteoarthritis    Acute blood loss anemia           Current Outpatient Medications   Medication Instructions     atenolol (TENORMIN) 25 mg, Oral, EVERY MORNING     calcium 600 mg, DAILY     CHOLECALCIFEROL PO 2,000 Units, Oral, DAILY     doxycycline hyclate (VIBRAMYCIN) 100 mg, Oral, 2 TIMES DAILY, May use monohydrate if cheaper.  May use tablet or capsule for cost.     lisinopril (ZESTRIL) 40 mg, Oral, EVERY MORNING     multivitamin therapeutic (THERAGRAN) tablet 1 tablet, EVERY MORNING     pravastatin (PRAVACHOL) 40 mg, Oral, EVERY MORNING     PROAIR RESPICLICK 108 (90 Base) MCG/ACT inhaler INHALE 1 PUFF BY MOUTH EVERY 4 HOURS AS NEEDED     QVAR REDIHALER 80 MCG/ACT  inhaler INHALE 1 PUFF BY MOUTH 2 TIMES DAILY. DOESN'T NEED A SPACER OR SHAKING.      Social History     Social History Narrative    3 childen, 2 sons and 1 daughter.        Previous long term patient of Dr. Piper Greene.       Subjective:     Aranza Mcmanus is a 82 year old female who comes in today for:    Chief Complaint   Patient presents with     RECHECK     Fall, fractured left humerus can only lift so high and go so wide is still doing therapy was told possibly  a rib and is still has rib pain did not have a chest x ray around left breast,   cyst and pancreas     Referral     New dermatologist, not one in [facility]      Mouth/Lip Problem     Something wrong with gum seeing dentist after the holiday     Chest Pain     Has hx of has had EKGs and they have all been fine has been for more then a year but not happening often could be from when has stress has had on left and right side will last 1-2 days not long at a time     Results     CT from feb      Patient comes in today for follow-up of a number of issues.    We first reviewed her humeral fracture.  She is continue to follow-up with [facility] related to this.  She has increased her range of motion.    She is feeling better from this.  She is not likely going to need any surgery with this.    Reviewed her sleep and she occasionally has problems with this.    She has been having some left lateral chest wall.  Has occasional sharp pains along the right costochondral area.  She has the left rib pain since she fell and broke her arm.  She would like to make sure she does not have an obvious rib fracture.  It still bothers her at times.    She has ongoing left foot and lower leg numbness which has been going on for a while.  We talked about EMG.  She had initially been scheduled to go to orthopedics with a foot specialist but she is not really having pain related this.    We reviewed her COPD and she has to sleep on her left side due to  her breathing issues.  She has been coughing a little bit more lately.  She would like to look into an adjustable bed as it is hard for her to breathe sometimes with laying flat.  An adjustable bed may help with this.    She has some pain over her gums on the anterior right maxillary teeth.  Its been swollen and somewhat uncomfortable.  She does not have an immediate dental visit available and has limited finances for this at times.    She denies any heartburn.    We reviewed her other issues noted in the assessment but not specifically addressed in the HPI above.     Objective:     Wt Readings from Last 3 Encounters:   06/23/22 91.6 kg (202 lb)   04/13/22 91.6 kg (202 lb)   02/18/22 90.7 kg (200 lb)     BP Readings from Last 3 Encounters:   06/23/22 126/72   04/13/22 122/70   02/28/22 136/72     /72   Resp 18   Wt 91.6 kg (202 lb)   BMI 33.61 kg/m     The patient is comfortable, no acute distress.  Mood good.  Insight good.  Eyes are nonicteric.  Neck is supple without mass.  No cervical adenopathy.  No thyromegaly. Heart regular rate and rhythm.  Lungs clear to auscultation bilaterally.  Respiratory effort is good.  Abdomen soft and nontender.  No hepatosplenomegaly.  Extremities no edema.  She could move her left arm to about 80 degrees of abduction.  Her mouth shows swelling of the gingiva with possible underlying infection.      Diagnostics:     [facility]/Nurse Visit on 04/13/2022   Component Date Value Ref Range Status     FVC-Pred 04/13/2022 2.66  L Final     FVC-Pre 04/13/2022 2.41  L Final     FVC-%Pred-Pre 04/13/2022 90  % Final     FEV1-Pre 04/13/2022 1.59  L Final     FEV1-%Pred-Pre 04/13/2022 78  % Final     FEV1FVC-Pred 04/13/2022 77  % Final     FEV1FVC-Pre 04/13/2022 66  % Final     FEFMax-Pred 04/13/2022 5.04  L/sec Final     FEFMax-Pre 04/13/2022 4.03  L/sec Final     FEFMax-%Pred-Pre 04/13/2022 79  % Final     FEF2575-Pred 04/13/2022 1.62  L/sec Final     FEF2575-Pre 04/13/2022 0.87   L/sec Final     RJW9136-%Pred-Pre 04/13/2022 53  % Final     FEF2575-Post 04/13/2022 1.40  L/sec Final     BRQ9909-%Pred-Post 04/13/2022 86  % Final     ExpTime-Pre 04/13/2022 7.75  sec Final     FIFMax-Pre 04/13/2022 4.20  L/sec Final     VC-Pred 04/13/2022 3.00  L Final     VC-Pre 04/13/2022 2.46  L Final     VC-%Pred-Pre 04/13/2022 81  % Final     IC-Pred 04/13/2022 2.68  L Final     IC-Pre 04/13/2022 1.77  L Final     IC-%Pred-Pre 04/13/2022 65  % Final     ERV-Pred 04/13/2022 0.31  L Final     ERV-Pre 04/13/2022 0.52  L Final     ERV-%Pred-Pre 04/13/2022 166  % Final     FEV1FEV6-Pred 04/13/2022 78  % Final     FEV1FEV6-Pre 04/13/2022 67  % Final     FRCPleth-Pred 04/13/2022 2.78  L Final     FRCPleth-Pre 04/13/2022 3.15  L Final     FRCPleth-%Pred-Pre 04/13/2022 113  % Final     RVPleth-Pred 04/13/2022 2.25  L Final     RVPleth-Pre 04/13/2022 2.47  L Final     RVPleth-%Pred-Pre 04/13/2022 109  % Final     TLCPleth-Pred 04/13/2022 5.11  L Final     TLCPleth-Pre 04/13/2022 4.92  L Final     TLCPleth-%Pred-Pre 04/13/2022 96  % Final     DLCOunc-Pred 04/13/2022 19.50  ml/min/mmHg Final     DLCOunc-Pre 04/13/2022 10.78  ml/min/mmHg Final     DLCOunc-%Pred-Pre 04/13/2022 55  % Final     DLCOcor-Pre 04/13/2022 10.59  ml/min/mmHg Final     DLCOcor-%Pred-Pre 04/13/2022 54  % Final     VA-Pre 04/13/2022 3.62  L Final     VA-%Pred-Pre 04/13/2022 75  % Final     FEV1SVC-Pred 04/13/2022 67  % Final     FEV1SVC-Pre 04/13/2022 65  % Final     Hemoglobin POCT 04/13/2022 14.0  g/dL Final       Results for orders placed or performed during the hospital encounter of 06/23/22   XR Ribs & Chest Left G/E 3 Views     Status: None    Narrative    EXAM: XR RIBS and CHEST LT 3VW  LOCATION: [facility]  DATE/TIME: 6/23/2022 6:58 PM    INDICATION:  Left-sided chest pain  COMPARISON: Chest CTA 01/06/2020, chest x-ray 05/24/2010      Impression    IMPRESSION: Lungs are clear. No hydropneumothorax. Heart and  pulmonary vascularity are normal.    Left rib detail films with a marker. No fracture. Old fracture deformity of the proximal left humerus noted.        Assessment:     1. Gingival disease    2. Left-sided chest pain    3. Closed 4-part fracture of surgical neck of left humerus, initial encounter    4. Asthma-COPD overlap syndrome (H)    5. Primary hypertension    6. Hypercholesteremia    7. History of skin cancer    8. Left leg numbness    9. Chest wall pain        Plan:     1. We treated her with doxycycline for the gingival problem.  She should see a dentist in the near future.  2. Follow-up with [facility] for her left arm.  3. I did a prescription for a adjustable bed.  4. We did an x-ray of the chest today.  5. Referral to dermatology for her history of skin cancer.  6. Consider EMG of the left leg in the future.  7. Continue current medications otherwise.  8. Follow up sooner if issues.    Orders Placed This Encounter   Procedures     XR Ribs & Chest Left G/E 3 Views        40 minutes or greater was spent today on the patient's care on the day of service.      This includes time for chart preparation, reviewing medical tests done before or during the visit, talking with the patient, review of quality indicators, required documentation, and other elements of care.        Agustin Okeefe MD  General Internal Medicine  [facility]    Return in about 24 weeks (around 12/8/2022), or if symptoms worsen or fail to improve, for follow up visit.     Future Appointments   Date Time Provider Department Center   12/8/2022 12:30 PM Agustin Okeefe MD [facility]

## 2022-07-06 ENCOUNTER — TRANSFERRED RECORDS (OUTPATIENT)
Dept: HEALTH INFORMATION MANAGEMENT | Facility: CLINIC | Age: 82
End: 2022-07-06

## 2022-07-15 ENCOUNTER — ANCILLARY PROCEDURE (OUTPATIENT)
Dept: MAMMOGRAPHY | Facility: CLINIC | Age: 82
End: 2022-07-15
Attending: INTERNAL MEDICINE
Payer: COMMERCIAL

## 2022-07-15 DIAGNOSIS — Z12.31 VISIT FOR SCREENING MAMMOGRAM: ICD-10-CM

## 2022-07-15 PROCEDURE — 77067 SCR MAMMO BI INCL CAD: CPT

## 2022-08-06 DIAGNOSIS — E78.00 HYPERCHOLESTEREMIA: ICD-10-CM

## 2022-08-06 DIAGNOSIS — I10 BENIGN ESSENTIAL HYPERTENSION: ICD-10-CM

## 2022-08-08 RX ORDER — LISINOPRIL 40 MG/1
TABLET ORAL
Qty: 90 TABLET | Refills: 2 | OUTPATIENT
Start: 2022-08-08

## 2022-08-08 RX ORDER — PRAVASTATIN SODIUM 40 MG
TABLET ORAL
Qty: 90 TABLET | Refills: 2 | OUTPATIENT
Start: 2022-08-08

## 2022-08-08 RX ORDER — ATENOLOL 25 MG/1
TABLET ORAL
Qty: 90 TABLET | Refills: 2 | OUTPATIENT
Start: 2022-08-08

## 2022-10-01 ENCOUNTER — HEALTH MAINTENANCE LETTER (OUTPATIENT)
Age: 82
End: 2022-10-01

## 2022-11-20 DIAGNOSIS — I10 BENIGN ESSENTIAL HYPERTENSION: ICD-10-CM

## 2022-11-20 DIAGNOSIS — E78.00 HYPERCHOLESTEREMIA: ICD-10-CM

## 2022-11-22 RX ORDER — LISINOPRIL 40 MG/1
TABLET ORAL
Qty: 90 TABLET | Refills: 2 | Status: SHIPPED | OUTPATIENT
Start: 2022-11-22 | End: 2023-06-29

## 2022-11-22 RX ORDER — ATENOLOL 25 MG/1
TABLET ORAL
Qty: 90 TABLET | Refills: 2 | Status: SHIPPED | OUTPATIENT
Start: 2022-11-22 | End: 2023-08-08

## 2022-11-22 RX ORDER — PRAVASTATIN SODIUM 40 MG
TABLET ORAL
Qty: 90 TABLET | Refills: 2 | Status: SHIPPED | OUTPATIENT
Start: 2022-11-22 | End: 2023-08-08

## 2022-11-29 ENCOUNTER — TELEPHONE (OUTPATIENT)
Dept: INTERNAL MEDICINE | Facility: CLINIC | Age: 82
End: 2022-11-29

## 2022-11-30 NOTE — TELEPHONE ENCOUNTER
Please call patient -    ______________________________________________________________________     Home phone:  [phone] (home)     Cell phone:   Telephone Information:   Mobile [phone]       Other contacts:  Name Home Phone Work Phone Mobile Phone Relationship Lgl Lilibeth FISHWHITNEY [phone]   Other      ______________________________________________________________________     She is scheduled for a annual wellness visit with me on December 8th.      Her insurance will not cover an annual wellness visit until December 14th or after.  May reschedule to a reserved slot so that we can get it covered.    Agustin Okeefe MD  [facility]  11/29/2022, 10:08 PM

## 2022-12-01 NOTE — TELEPHONE ENCOUNTER
Called patient: Informed her that her insurance will not cover AVW until December 14 or after.     Re-schedule AWV to 12/16/22 at 11:30 am.    Torrie Churchill MA on 12/1/2022 at 10:34 AM

## 2022-12-02 ENCOUNTER — APPOINTMENT (OUTPATIENT)
Dept: CT IMAGING | Facility: HOSPITAL | Age: 82
End: 2022-12-02
Attending: EMERGENCY MEDICINE
Payer: COMMERCIAL

## 2022-12-02 ENCOUNTER — HOSPITAL ENCOUNTER (EMERGENCY)
Facility: HOSPITAL | Age: 82
Discharge: HOME | End: 2022-12-02
Attending: EMERGENCY MEDICINE | Admitting: EMERGENCY MEDICINE
Payer: COMMERCIAL

## 2022-12-02 VITALS
HEIGHT: 65 IN | OXYGEN SATURATION: 99 % | BODY MASS INDEX: 31.82 KG/M2 | DIASTOLIC BLOOD PRESSURE: 92 MMHG | WEIGHT: 191 LBS | HEART RATE: 62 BPM | TEMPERATURE: 97.7 F | SYSTOLIC BLOOD PRESSURE: 196 MMHG | RESPIRATION RATE: 16 BRPM

## 2022-12-02 DIAGNOSIS — M54.50 ACUTE LEFT-SIDED LOW BACK PAIN WITHOUT SCIATICA: ICD-10-CM

## 2022-12-02 LAB
ALBUMIN SERPL BCG-MCNC: 4.6 G/DL (ref 3.5–5.2)
ALBUMIN UR-MCNC: NEGATIVE MG/DL
ALP SERPL-CCNC: 74 U/L (ref 35–104)
ALT SERPL W P-5'-P-CCNC: 25 U/L (ref 10–35)
ANION GAP SERPL CALCULATED.3IONS-SCNC: 8 MMOL/L (ref 7–15)
APPEARANCE UR: CLEAR
AST SERPL W P-5'-P-CCNC: 22 U/L (ref 10–35)
BASOPHILS # BLD AUTO: 0.1 10E3/UL (ref 0–0.2)
BASOPHILS NFR BLD AUTO: 1 %
BILIRUB SERPL-MCNC: 0.4 MG/DL
BILIRUB UR QL STRIP: NEGATIVE
BUN SERPL-MCNC: 21.9 MG/DL (ref 8–23)
CALCIUM SERPL-MCNC: 9.6 MG/DL (ref 8.8–10.2)
CHLORIDE SERPL-SCNC: 105 MMOL/L (ref 98–107)
COLOR UR AUTO: YELLOW
CREAT SERPL-MCNC: 1.03 MG/DL (ref 0.51–0.95)
D DIMER PPP FEU-MCNC: 0.49 UG/ML FEU (ref 0–0.5)
DEPRECATED HCO3 PLAS-SCNC: 26 MMOL/L (ref 22–29)
EOSINOPHIL # BLD AUTO: 0.2 10E3/UL (ref 0–0.7)
EOSINOPHIL NFR BLD AUTO: 2 %
ERYTHROCYTE [DISTWIDTH] IN BLOOD BY AUTOMATED COUNT: 12.6 % (ref 10–15)
GFR SERPL CREATININE-BSD FRML MDRD: 55 ML/MIN/1.73M2
GLUCOSE SERPL-MCNC: 97 MG/DL (ref 70–99)
GLUCOSE UR STRIP-MCNC: NEGATIVE MG/DL
HCT VFR BLD AUTO: 48.1 % (ref 35–47)
HGB BLD-MCNC: 15.3 G/DL (ref 11.7–15.7)
HGB UR QL STRIP: NEGATIVE
HOLD SPECIMEN: NORMAL
IMM GRANULOCYTES # BLD: 0 10E3/UL
IMM GRANULOCYTES NFR BLD: 0 %
KETONES UR STRIP-MCNC: NEGATIVE MG/DL
LEUKOCYTE ESTERASE UR QL STRIP: ABNORMAL
LIPASE SERPL-CCNC: 51 U/L (ref 13–60)
LYMPHOCYTES # BLD AUTO: 1.4 10E3/UL (ref 0.8–5.3)
LYMPHOCYTES NFR BLD AUTO: 20 %
MCH RBC QN AUTO: 30 PG (ref 26.5–33)
MCHC RBC AUTO-ENTMCNC: 31.8 G/DL (ref 31.5–36.5)
MCV RBC AUTO: 94 FL (ref 78–100)
MONOCYTES # BLD AUTO: 0.6 10E3/UL (ref 0–1.3)
MONOCYTES NFR BLD AUTO: 8 %
MUCOUS THREADS #/AREA URNS LPF: PRESENT /LPF
NEUTROPHILS # BLD AUTO: 5 10E3/UL (ref 1.6–8.3)
NEUTROPHILS NFR BLD AUTO: 69 %
NITRATE UR QL: NEGATIVE
NRBC # BLD AUTO: 0 10E3/UL
NRBC BLD AUTO-RTO: 0 /100
PH UR STRIP: 5 [PH] (ref 5–7)
PLATELET # BLD AUTO: 219 10E3/UL (ref 150–450)
POTASSIUM SERPL-SCNC: 4.3 MMOL/L (ref 3.4–5.3)
PROT SERPL-MCNC: 6.8 G/DL (ref 6.4–8.3)
RBC # BLD AUTO: 5.1 10E6/UL (ref 3.8–5.2)
RBC URINE: 0 /HPF
SODIUM SERPL-SCNC: 139 MMOL/L (ref 136–145)
SP GR UR STRIP: 1.02 (ref 1–1.03)
SQUAMOUS EPITHELIAL: 3 /HPF
UROBILINOGEN UR STRIP-MCNC: <2 MG/DL
WBC # BLD AUTO: 7.2 10E3/UL (ref 4–11)
WBC URINE: 24 /HPF

## 2022-12-02 PROCEDURE — 258N000003 HC RX IP 258 OP 636: Performed by: EMERGENCY MEDICINE

## 2022-12-02 PROCEDURE — 99285 EMERGENCY DEPT VISIT HI MDM: CPT | Mod: 25

## 2022-12-02 PROCEDURE — 74177 CT ABD & PELVIS W/CONTRAST: CPT

## 2022-12-02 PROCEDURE — 87086 URINE CULTURE/COLONY COUNT: CPT | Performed by: EMERGENCY MEDICINE

## 2022-12-02 PROCEDURE — 96360 HYDRATION IV INFUSION INIT: CPT | Mod: 59

## 2022-12-02 PROCEDURE — 84295 ASSAY OF SERUM SODIUM: CPT | Performed by: EMERGENCY MEDICINE

## 2022-12-02 PROCEDURE — 71275 CT ANGIOGRAPHY CHEST: CPT

## 2022-12-02 PROCEDURE — 83690 ASSAY OF LIPASE: CPT | Performed by: EMERGENCY MEDICINE

## 2022-12-02 PROCEDURE — 85379 FIBRIN DEGRADATION QUANT: CPT | Performed by: EMERGENCY MEDICINE

## 2022-12-02 PROCEDURE — 81001 URINALYSIS AUTO W/SCOPE: CPT | Performed by: EMERGENCY MEDICINE

## 2022-12-02 PROCEDURE — 36415 COLL VENOUS BLD VENIPUNCTURE: CPT | Performed by: EMERGENCY MEDICINE

## 2022-12-02 PROCEDURE — 250N000011 HC RX IP 250 OP 636: Performed by: EMERGENCY MEDICINE

## 2022-12-02 PROCEDURE — 93005 ELECTROCARDIOGRAM TRACING: CPT | Performed by: EMERGENCY MEDICINE

## 2022-12-02 PROCEDURE — 80053 COMPREHEN METABOLIC PANEL: CPT | Performed by: EMERGENCY MEDICINE

## 2022-12-02 PROCEDURE — 85025 COMPLETE CBC W/AUTO DIFF WBC: CPT | Performed by: EMERGENCY MEDICINE

## 2022-12-02 RX ORDER — IOPAMIDOL 755 MG/ML
100 INJECTION, SOLUTION INTRAVASCULAR ONCE
Status: COMPLETED | OUTPATIENT
Start: 2022-12-02 | End: 2022-12-02

## 2022-12-02 RX ADMIN — IOPAMIDOL 100 ML: 755 INJECTION, SOLUTION INTRAVENOUS at 17:27

## 2022-12-02 RX ADMIN — SODIUM CHLORIDE 500 ML: 9 INJECTION, SOLUTION INTRAVENOUS at 14:11

## 2022-12-02 ASSESSMENT — ACTIVITIES OF DAILY LIVING (ADL): ADLS_ACUITY_SCORE: 35

## 2022-12-02 NOTE — ED NOTES
ED Triage Provider Note  [facility] EMERGENCY DEPARTMENT  Encounter Date: Dec 2, 2022    History:  Chief Complaint   Patient presents with     Back Pain     Aranza Mcmanus is a 82 year old female who presents to the ED with low back pain x 4 days.  Work up and it was there.  Lower left side.  PMHx PE in 2000.  Patient reports pain feels similar to that but seems lower.  Flank pain to right low back pain.  No radiating. Worse with movement.  No cp, no sob.  No longer on blood thinners.    Review of Systems:  Denies new leg pain or swelling, has chronic pain due to neuropathy    Exam:  BP (!) 183/86   Pulse 69   Temp 97.5  F (36.4  C) (Temporal)   Resp 20   Wt 91 kg (200 lb 9.9 oz)   SpO2 95%   BMI 33.38 kg/m    General: No acute distress. Appears stated age.   Cardio: normal rate, extremities well perfused  Resp: Normal work of breathing, grossly normal respiratory rate  Neuro: Alert. Facial movement grossly symmetric. Grossly intact strength.   Pain to palpation of the left flank and left paraspinal muscles    Medical Decision Making:  Patient arriving to the ED with problem as above. A medical screening exam was performed. Initial differential diagnosis includes but not limited to muscle strain, radiculopathy, low prob based on exam of pe.  Orders Placed This Encounter   Procedures     D dimer quantitative     Comprehensive metabolic panel     Lipase     UA with Microscopic reflex to Culture     Peripheral IV catheter     CBC with platelets differential      The patient is most appropriate to return to the waiting room.     Pain seems MSK based on exam, however, patient reports similar to prev PE.      Marty Sifuentes MD  12/2/2022 at 1:49 PM     Marty Sifuentes MD  12/02/22 [id number]

## 2022-12-02 NOTE — ED PROVIDER NOTES
Emergency Department Encounter     Evaluation Date & Time:   No admission date for patient encounter.    CHIEF COMPLAINT:  Back Pain      Triage Note:She started to have pain in her lower left back four days ago. She had similar pain on the irght that turned out to be a PE a few years ago. She was not doing anything specific when this started four days ago. She does some slight SOB associated with it. Denies chest pain at this time.             Impression and Plan       FINAL IMPRESSION:    ICD-10-CM    1. Acute left-sided low back pain without sciatica  M54.50             ED COURSE & MEDICAL DECISION MAKING:  3:31 PM I met with patient for initial encounter.  5:58 PM I updated patient with results and plan for discharge.    82 year old female, history of remote PE (no longer anticoagulated), HTN and HLD, who presents for evaluation of atraumatic, sharp left lower back pain radiating around to her lower abdomen x 4 days.  No associated urinary symptoms, N/V/D or fevers.    On exam, abdomen is soft with mild to moderate tenderness to palpation LLQ and left side of abdomen; no peritoneal signs or CVAT, BL.    IV access established and blood sent for labs.    UA mildly contaminated (3 epi cells) with 250 LE and 24 WBCs.  No urinary symptoms, thus will hold on antibiotics pending urine culture results.    Labs otherwise remarkable for no leukocytosis or anemia.  No significant electrolyte derangements with mild renal insufficiency (creatinine 1.03, GFR 55).  Liver function tests unremarkable with no laboratory evidence of pancreatitis.    Patient reports that symptoms feel very similar to when she was diagnosed with a PE.  D-dimer WNL (0.49), however joint decision made to obtain imaging.    CTA chest negative for PE with mild emphysema.    CT abdomen / pelvis with no acute findings.    No radicular symptoms. She is neuro intact without bowel or bladder dysfunction.  I do not suspect herniated disc, cauda equina  syndrome, epidural abscess, epidural hematoma, osteomyelitis, discitis, transverse myelitis or other neurosurgical emergency and do not think emergent MRI is indicated.    The pain is worse with hip flexion and walking, thus consider musculoskeletal etiology.    Vitals, blood work and imaging all reassuring.  Patient discharged to home with follow-up with her primary care provider.  Return precautions provided.  Patient stable throughout ED course.      At the conclusion of the encounter I discussed the results of all the tests and the disposition. The questions were answered. The patient acknowledged understanding and was agreeable with the care plan.        MEDICATIONS GIVEN IN THE EMERGENCY DEPARTMENT:  Medications   0.9% sodium chloride BOLUS (0 mLs Intravenous Stopped 12/2/22 1500)   iopamidol (ISOVUE-370) solution 100 mL (100 mLs Intravenous Given 12/2/22 1727)       NEW PRESCRIPTIONS STARTED AT TODAY'S ED VISIT:  Discharge Medication List as of 12/2/2022  6:02 PM          HPI     HPI     Aranza Mcmanus is an 82 year old female, history of PE, HTN, HLD, who presents to this ED via walk-in for evaluation of back pain.    Patient endorses 4 days of left lower back pain radiating to her left lower abdomen. The pain feels deep and sharp in nature and is worse with hip flexion, walking and sitting. No radiation down either leg. No associated lower extremity weakness or paresthesias. No urinary symptoms. No urinary retention or incontinence of urine or stool. Denies associated N/V/D and constipation. No fevers.     Patient reports having similar symptoms when she was diagnosed with a PE 22 years ago. Patient is no longer on blood thinners.     Patient has otherwise been in her usual state of health and denies chest pain, shortness of breath, cough or other concerns.    REVIEW OF SYSTEMS:  All other systems reviewed and are negative.      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     Attention deficit disorder (ADD) in adult 3/28/2020     Chronic GERD 3/28/2020     Former smoker, stopped smoking in distant past      Gallstone pancreatitis 2021     HTN (hypertension)      Hypercholesteremia      Hypertension      Mild intermittent asthma with exacerbation 2020     Osteopenia 3/28/2020     Right pulmonary embolus (H) 2020       Past Surgical History:   Procedure Laterality Date     ANKLE SURGERY Left      CHOLECYSTECTOMY       FOOT SURGERY Left      HYSTERECTOMY  1998     TONSILLECTOMY & ADENOIDECTOMY Bilateral        Family History   Problem Relation Age of Onset     Crohn's Disease Mother      Hypertension Mother      Kidney Cancer Father      Lung Cancer Sister      Cancer Brother         Metastatic parotid gland tumor     Coronary Artery Disease Brother      Dementia Brother      Breast Cancer Maternal Grandmother 30        bilateral mastectomy done yrs ago.     Colon Cancer Paternal Grandmother      Atrial fibrillation Son      Cardiomyopathy Son      Coronary Artery Disease Son      No Known Problems Son      No Known Problems Daughter        Social History     Tobacco Use     Smoking status: Former     Packs/day: 1.00     Years: 6.00     Pack years: 6.00     Types: Cigarettes     Quit date: 1975     Years since quittin.9     Smokeless tobacco: Never   Substance Use Topics     Alcohol use: Yes     Alcohol/week: 0.8 standard drinks     Comment: Alcoholic Drinks/day: occ.     Drug use: Not Currently     Types: Marijuana       atenolol (TENORMIN) 25 MG tablet  calcium carbonate (OS-STERLING) 600 mg calcium (1,500 mg) tablet  CHOLECALCIFEROL PO  lisinopril (ZESTRIL) 40 MG tablet  multivitamin therapeutic (THERAGRAN) tablet  pravastatin (PRAVACHOL) 40 MG tablet  PROAIR RESPICLICK 108 (90 Base) MCG/ACT inhaler  QVAR REDIHALER 80 MCG/ACT inhaler        Physical Exam     First  "Vitals:  Patient Vitals for the past 24 hrs:   BP Temp Temp src Pulse Resp SpO2 Height Weight   12/02/22 1800 (!) 196/92 -- -- 62 16 99 % -- --   12/02/22 1635 (!) 190/88 97.7  F (36.5  C) Oral 57 20 99 % 1.651 m (5' 5\") 86.6 kg (191 lb)   12/02/22 1352 (!) 183/86 97.5  F (36.4  C) Temporal 69 20 95 % -- 91 kg (200 lb 9.9 oz)       PHYSICAL EXAM:   Physical Exam    GENERAL: Awake, alert.  In no acute distress.   HEENT: Normocephalic, atraumatic. Pupils equal, round and reactive. Conjunctiva normal.  NECK: No stridor.  PULMONARY: Symmetrical breath sounds without distress.  Lungs clear to auscultation bilaterally without wheezes, rhonchi or rales.  CARDIO: Regular rate and rhythm.  No significant murmur, rub or gallop.  Radial pulses strong and symmetrical.  ABDOMINAL: Abdomen soft, non-distended with mild to moderate tenderness to palpation LLQ and left side of abdomen; no rebound tenderness or guarding.  No CVAT, BL.  EXTREMITIES: No lower extremity swelling or edema.      NEURO: Alert and oriented to person, place and time.  Cranial nerves grossly intact.  No focal motor deficit.  Strength 5/5 BL lower extremities (hip flexion, knee extension / flexion, plantarflexion / dorsiflexion ankles) with sensation to light touch grossly intact.  Normal gait.  PSYCH: Normal mood and affect.  SKIN: No rashes.     Results     LAB:  All pertinent labs reviewed and interpreted  Labs Ordered and Resulted from Time of ED Arrival to Time of ED Departure   COMPREHENSIVE METABOLIC PANEL - Abnormal       Result Value    Sodium 139      Potassium 4.3      Chloride 105      Carbon Dioxide (CO2) 26      Anion Gap 8      Urea Nitrogen 21.9      Creatinine 1.03 (*)     Calcium 9.6      Glucose 97      Alkaline Phosphatase 74      AST 22      ALT 25      Protein Total 6.8      Albumin 4.6      Bilirubin Total 0.4      GFR Estimate 55 (*)    ROUTINE UA WITH MICROSCOPIC REFLEX TO CULTURE - Abnormal    Color Urine Yellow      Appearance Urine " Clear      Glucose Urine Negative      Bilirubin Urine Negative      Ketones Urine Negative      Specific Gravity Urine 1.025      Blood Urine Negative      pH Urine 5.0      Protein Albumin Urine Negative      Urobilinogen Urine <2.0      Nitrite Urine Negative      Leukocyte Esterase Urine 250 Keren/uL (*)     Mucus Urine Present (*)     RBC Urine 0      WBC Urine 24 (*)     Squamous Epithelials Urine 3 (*)    CBC WITH PLATELETS AND DIFFERENTIAL - Abnormal    WBC Count 7.2      RBC Count 5.10      Hemoglobin 15.3      Hematocrit 48.1 (*)     MCV 94      MCH 30.0      MCHC 31.8      RDW 12.6      Platelet Count 219      % Neutrophils 69      % Lymphocytes 20      % Monocytes 8      % Eosinophils 2      % Basophils 1      % Immature Granulocytes 0      NRBCs per 100 WBC 0      Absolute Neutrophils 5.0      Absolute Lymphocytes 1.4      Absolute Monocytes 0.6      Absolute Eosinophils 0.2      Absolute Basophils 0.1      Absolute Immature Granulocytes 0.0      Absolute NRBCs 0.0     D DIMER QUANTITATIVE - Normal    D-Dimer Quantitative 0.49     LIPASE - Normal    Lipase 51     URINE CULTURE       RADIOLOGY:  CT Abdomen Pelvis w Contrast   Final Result   IMPRESSION:    No acute findings or CT evidence for source of symptoms.      CT Chest Pulmonary Embolism w Contrast   Final Result   IMPRESSION:   1.  No evidence for pulmonary embolism.   2.  Mild emphysema.          EC2022, 15:16; NSR with rate of 61 bpm; PACs; normal intervals; normal conduction; inferior Q waves; poor R wave progression; no ST-T wave changes consistent with ACS or pericarditis; compared to previous EKG dated 2020, the poor R wave progression is new    EKG independently reviewed and interpreted by Juliana Horan MD      I, Santhosh Bell, am serving as a scribe to document services personally performed by Juliana Horan MD based on my observation and the provider's statements to me. I, Juliana Horan MD attest that Santhosh Bell is acting in a  scribe capacity, has observed my performance of the services and has documented them in accordance with my direction.    Juliana Horan MD  Emergency Medicine  [facility] EMERGENCY DEPARTMENT          Juliana Horan MD  12/03/22 0911

## 2022-12-02 NOTE — ED TRIAGE NOTES
She started to have pain in her lower left back four days ago. She had similar pain on the irght that turned out to be a PE a few years ago. She was not doing anything specific when this started four days ago. She does some slight SOB associated with it. Denies chest pain at this time.

## 2022-12-02 NOTE — DISCHARGE INSTRUCTIONS
Please follow-up with your primary care provider within 3-5 days; call to arrange appointment.    Return to the ER for worsening symptoms, worsening pain, worsening abdominal pain, persistent nausea / vomiting, weakness or numbness in either leg, if you are unable to empty your bladder, if you lose control of urine or stool, fever or other concerns.

## 2022-12-04 LAB — BACTERIA UR CULT: NORMAL

## 2022-12-16 ENCOUNTER — OFFICE VISIT (OUTPATIENT)
Dept: INTERNAL MEDICINE | Facility: CLINIC | Age: 82
End: 2022-12-16
Payer: COMMERCIAL

## 2022-12-16 DIAGNOSIS — I26.99 RIGHT PULMONARY EMBOLUS (H): ICD-10-CM

## 2022-12-16 DIAGNOSIS — M19.90 OSTEOARTHRITIS, UNSPECIFIED OSTEOARTHRITIS TYPE, UNSPECIFIED SITE: ICD-10-CM

## 2022-12-16 DIAGNOSIS — Z00.00 MEDICARE ANNUAL WELLNESS VISIT, SUBSEQUENT: Primary | ICD-10-CM

## 2022-12-16 DIAGNOSIS — R20.2 PARESTHESIA OF LEFT FOOT: ICD-10-CM

## 2022-12-16 DIAGNOSIS — E78.00 HYPERCHOLESTEREMIA: ICD-10-CM

## 2022-12-16 DIAGNOSIS — I10 PRIMARY HYPERTENSION: Chronic | ICD-10-CM

## 2022-12-16 DIAGNOSIS — J44.89 ASTHMA-COPD OVERLAP SYNDROME (H): ICD-10-CM

## 2022-12-16 DIAGNOSIS — M79.18 LEFT BUTTOCK PAIN: ICD-10-CM

## 2022-12-16 DIAGNOSIS — M54.50 LEFT LUMBAR PAIN: ICD-10-CM

## 2022-12-16 DIAGNOSIS — K21.9 CHRONIC GERD: ICD-10-CM

## 2022-12-16 PROCEDURE — G0439 PPPS, SUBSEQ VISIT: HCPCS | Performed by: INTERNAL MEDICINE

## 2022-12-16 PROCEDURE — 99214 OFFICE O/P EST MOD 30 MIN: CPT | Mod: 25 | Performed by: INTERNAL MEDICINE

## 2022-12-16 RX ORDER — ACETAMINOPHEN 500 MG
500 TABLET ORAL EVERY 8 HOURS PRN
COMMUNITY

## 2022-12-16 RX ORDER — AMLODIPINE BESYLATE 5 MG/1
5 TABLET ORAL EVERY EVENING
Qty: 90 TABLET | Refills: 3 | Status: SHIPPED | OUTPATIENT
Start: 2022-12-16 | End: 2023-12-10

## 2022-12-16 ASSESSMENT — ENCOUNTER SYMPTOMS
FEVER: 0
FREQUENCY: 1
DYSURIA: 0
COUGH: 0
HEMATURIA: 0
CHILLS: 0
JOINT SWELLING: 0
NAUSEA: 0
BREAST MASS: 0
SHORTNESS OF BREATH: 0
WEAKNESS: 0
HEMATOCHEZIA: 0
MYALGIAS: 1
HEADACHES: 0
DIARRHEA: 0
CONSTIPATION: 0
DIZZINESS: 0
EYE PAIN: 0
PARESTHESIAS: 0
SORE THROAT: 0
PALPITATIONS: 0
ABDOMINAL PAIN: 0
NERVOUS/ANXIOUS: 0

## 2022-12-16 ASSESSMENT — ACTIVITIES OF DAILY LIVING (ADL): CURRENT_FUNCTION: NO ASSISTANCE NEEDED

## 2022-12-16 NOTE — PROGRESS NOTES
[facility]    Comprehensive and complex medical care - Chronic disease management - Shared decision making - Care coordination - Compassionate care    Patient advocacy - Rational deprescribing - Minimally disruptive medicine - Ethical focus - Customized care         Date of Service: 12/16/2022  Primary Provider: Agustin Okeefe    Patient Care Team:  Agustin Okeefe MD as PCP - General (Internal Medicine)  Piper Mercedes MD as Assigned PCP  Manish Morales MD as Assigned Pulmonology Provider          Patient's Pharmacy:    [facility] #[id number] - [place], [place] - [address] [facility]  [address] [address]  Phone: [phone] Fax: [phone]     Patient's Contacts:  Name Home Phone Work Phone Mobile Phone Relationship Lgl WHITNEY Vu [phone]   Other      Patient's Insurance:    Payor: [employer] / Plan: [employer] MEDICARE / Product Type: HMO /      Subjective:     History of present illness:    Aranza Mcmanus is an 82 year old here for an annual wellness visit.    The issues she would like to address at today's visit include the following:    Chief Complaint   Patient presents with     Annual Visit     Hospital F/U     12/02/22 - gotten better     Urinary Problem     At night time        Generally doing okay at this time without much concerns.    Did recovery well from right humerus fracture last year.  Good range of motion in the shoulder at this time.    Blood pressure has been running higher as an outpatient.  130s to 150s.  Denies any chest pain or chest pressure, shortness of breath or lower extremity edema.  No headaches.  Would like to consider other medications if needed.    Left back was bad recently and was seen in the emergency room (ER).  Was severe and lasted about 10 days.  A very little bit still.  No radiation of the pain.  Was in the left back and posterior butt.      Has had issues with left leg numbness in the past  - now at times from the knee down.  No loss of strength.  Sometimes worse when laying on her right side.    Asthma stable at this time.    We reviewed her other issues noted in the assessment but not specifically addressed in the HPI above.           Active Problem List:  Problem List as of 12/16/2022 Reviewed: 12/2/2022  2:49 PM by Santhosh Bell    Former smoker, stopped smoking in distant past    Right pulmonary embolus (H)       Medium    HTN (hypertension)    Asthma-COPD overlap syndrome (H)       Low    Hypercholesteremia    Chronic GERD    Paresthesia of left foot    Morbid obesity (H)       Other    Osteoarthritis    Acute blood loss anemia        Past Medical History:   Diagnosis Date     Acute blood loss anemia 2/18/2015     Arthritis      Asthma      Asthma-COPD overlap syndrome (H) 3/1/2022     Attention deficit disorder (ADD) in adult 3/28/2020     Chronic GERD 3/28/2020     Former smoker, stopped smoking in distant past      Gallstone pancreatitis 9/9/2021     HTN (hypertension)      Hypercholesteremia      Hypertension      Mild intermittent asthma with exacerbation 1/6/2020     Osteopenia 3/28/2020     Right pulmonary embolus (H) 1/6/2020    EXAM: CTA CHEST PE RUN  LOCATION: [facility]  DATE/TIME: 1/6/2020 1:43 AM   INDICATION: Pleuritic right lower chest pain. Right upper quadrant pain.  COMPARISON: None.  TECHNIQUE: CT angiogram chest during arterial phase injection IV contrast. 2D and 3D MIP reconstructions were performed by the CT technologist. Dose reduction techniques were used.  CONTRAST: Iohexol (Omni) 100 mL   FINDIN      Past Surgical History:   Procedure Laterality Date     ANKLE SURGERY Left      CHOLECYSTECTOMY  2020     FOOT SURGERY Left      HYSTERECTOMY  01/01/1998     TONSILLECTOMY & ADENOIDECTOMY Bilateral      TOTAL HIP ARTHROPLASTY Left       Family History   Problem Relation Age of Onset     Crohn's Disease Mother      Hypertension Mother      Kidney Cancer  Father      Lung Cancer Sister      Cancer Brother         Metastatic parotid gland tumor     Coronary Artery Disease Brother      Dementia Brother      Breast Cancer Maternal Grandmother 30        bilateral mastectomy done yrs ago.     Colon Cancer Paternal Grandmother      Atrial fibrillation Son      Cardiomyopathy Son      Coronary Artery Disease Son      No Known Problems Son      No Known Problems Daughter       Family history is otherwise noncontributory.    Social History     Occupational History     Not on file   Tobacco Use     Smoking status: Former     Packs/day: 1.00     Years: 6.00     Pack years: 6.00     Types: Cigarettes     Quit date: 1975     Years since quittin.9     Smokeless tobacco: Never   Substance and Sexual Activity     Alcohol use: Yes     Alcohol/week: 0.8 standard drinks     Comment: Alcoholic Drinks/day: occ.     Drug use: Not Currently     Types: Marijuana     Sexual activity: Not on file      Social History     Social History Narrative    3 childen, 2 sons and 1 daughter.        Previous long term patient of Dr. Piper Greene.      Current Outpatient Medications   Medication Instructions     acetaminophen (TYLENOL) 450 mg, Oral, PRN     amLODIPine (NORVASC) 5 mg, Oral, EVERY EVENING, Take with the pravastatin (Pravachol)     atenolol (TENORMIN) 25 MG tablet TAKE 1 TABLET BY MOUTH EVERY MORNING     calcium 600 mg, DAILY     CHOLECALCIFEROL PO 2,000 Units, Oral, DAILY     diphenhydrAMINE-acetaminophen (TYLENOL PM)  MG tablet 1 tablet, Oral, AT BEDTIME PRN     lisinopril (ZESTRIL) 40 MG tablet TAKE 1 TABLET BY MOUTH EVERY MORNING.     multivitamin therapeutic (THERAGRAN) tablet 1 tablet, EVERY MORNING     pravastatin (PRAVACHOL) 40 MG tablet TAKE 1 TABLET BY MOUTH EVERY MORNING.     PROAIR RESPICLICK 108 (90 Base) MCG/ACT inhaler INHALE 1 PUFF BY MOUTH EVERY 4 HOURS AS NEEDED     QVAR REDIHALER 80 MCG/ACT inhaler INHALE 1 PUFF BY MOUTH 2 TIMES DAILY. DOESN'T NEED A  "SPACER OR SHAKING.      Allergies: Mold, Morphine, Oxycodone, Perfume, Tobacco [nicotiana tabacum], Valacyclovir, and Azithromycin     Immunization History   Administered Date(s) Administered     COVID-19 Vaccine 12+ (Pfizer 2022) 06/23/2022     COVID-19 Vaccine 12+ (Pfizer) 03/09/2021, 04/01/2021, 10/14/2021     COVID-19 Vaccine Bivalent Booster 12+ (Pfizer) 10/25/2022     FLU 6-35 months 11/01/2007     FLUAD(HD)65+ QUAD 10/25/2022     Flu, Unspecified 12/01/2014     Influenza (H1N1) 12/16/2009     Influenza (High Dose) 3 valent vaccine 03/09/2020     Influenza Vaccine 50-64 or 18-64 w/egg allergy (Flublok) 10/25/2022     Influenza Vaccine 65+ (Fluzone HD) 09/08/2021     Pneumo Conj 13-V (2010&after) 12/15/2015     Pneumococcal (PCV 7) 01/01/2007     Pneumococcal 23 valent 12/22/2006, 03/09/2020     TDAP Vaccine (Boostrix) 12/07/2009     Td (Adult), Adsorbed 01/01/2002     Zoster vaccine recombinant adjuvanted (SHINGRIX) 01/09/2021     Zoster vaccine, live 11/12/2008      Objective:     Wt Readings from Last 3 Encounters:   12/16/22 88.5 kg (195 lb)   12/02/22 86.6 kg (191 lb)   06/23/22 91.6 kg (202 lb)     BP Readings from Last 3 Encounters:   12/18/22 138/88   12/02/22 (!) 196/92   06/23/22 126/72       PHYSICAL EXAM  /88   Pulse 65   Temp 98.7  F (37.1  C) (Oral)   Resp 20   Ht 1.651 m (5' 5\")   Wt 88.5 kg (195 lb)   SpO2 96%   BMI 32.45 kg/m     The patient is comfortable, no acute distress.  Mood good.  Insight is good.  No skin lesions or nodules of concern.  Ears clear.  Eyes are nonicteric.  Pupils equal and reactive.  Throat is clear.  Neck is supple without mass, no thyromegaly. No cervical or epitrochlear adenopathy.  No axillary lymph nodes. Heart regular rate and rhythm.  Lungs clear to auscultation bilaterally.  Respiratory effort good.  Abdomen soft and nontender.  No hepatosplenomegaly.  Extremities show no edema.      Diagnostics:     Admission on 12/02/2022, Discharged on 12/02/2022 "   Component Date Value Ref Range Status     D-Dimer Quantitative 12/02/2022 0.49  0.00 - 0.50 ug/mL FEU Final     Sodium 12/02/2022 139  136 - 145 mmol/L Final     Potassium 12/02/2022 4.3  3.4 - 5.3 mmol/L Final     Chloride 12/02/2022 105  98 - 107 mmol/L Final     Carbon Dioxide (CO2) 12/02/2022 26  22 - 29 mmol/L Final     Anion Gap 12/02/2022 8  7 - 15 mmol/L Final     Urea Nitrogen 12/02/2022 21.9  8.0 - 23.0 mg/dL Final     Creatinine 12/02/2022 1.03 (H)  0.51 - 0.95 mg/dL Final     Calcium 12/02/2022 9.6  8.8 - 10.2 mg/dL Final     Glucose 12/02/2022 97  70 - 99 mg/dL Final     Alkaline Phosphatase 12/02/2022 74  35 - 104 U/L Final     AST 12/02/2022 22  10 - 35 U/L Final     ALT 12/02/2022 25  10 - 35 U/L Final     Protein Total 12/02/2022 6.8  6.4 - 8.3 g/dL Final     Albumin 12/02/2022 4.6  3.5 - 5.2 g/dL Final     Bilirubin Total 12/02/2022 0.4  <=1.2 mg/dL Final     GFR Estimate 12/02/2022 55 (L)  >60 mL/min/1.73m2 Final    Effective December 21, 2021 eGFRcr in adults is calculated using the 2021 CKD-EPI creatinine equation which includes age and gender (Dana et al., NEJM, DOI: 10.1056/LPTGux1021610)     Lipase 12/02/2022 51  13 - 60 U/L Final     Color Urine 12/02/2022 Yellow  Colorless, Straw, Light Yellow, Yellow Final     Appearance Urine 12/02/2022 Clear  Clear Final     Glucose Urine 12/02/2022 Negative  Negative mg/dL Final     Bilirubin Urine 12/02/2022 Negative  Negative Final     Ketones Urine 12/02/2022 Negative  Negative mg/dL Final     Specific Gravity Urine 12/02/2022 1.025  1.001 - 1.030 Final     Blood Urine 12/02/2022 Negative  Negative Final     pH Urine 12/02/2022 5.0  5.0 - 7.0 Final     Protein Albumin Urine 12/02/2022 Negative  Negative mg/dL Final     Urobilinogen Urine 12/02/2022 <2.0  <2.0 mg/dL Final     Nitrite Urine 12/02/2022 Negative  Negative Final     Leukocyte Esterase Urine 12/02/2022 250 Keren/uL (A)  Negative Final     Mucus Urine 12/02/2022 Present (A)  None Seen /LPF  Final     RBC Urine 12/02/2022 0  <=2 /HPF Final     WBC Urine 12/02/2022 24 (H)  <=5 /HPF Final     Squamous Epithelials Urine 12/02/2022 3 (H)  <=1 /HPF Final     WBC Count 12/02/2022 7.2  4.0 - 11.0 10e3/uL Final     RBC Count 12/02/2022 5.10  3.80 - 5.20 10e6/uL Final     Hemoglobin 12/02/2022 15.3  11.7 - 15.7 g/dL Final     Hematocrit 12/02/2022 48.1 (H)  35.0 - 47.0 % Final     MCV 12/02/2022 94  78 - 100 fL Final     MCH 12/02/2022 30.0  26.5 - 33.0 pg Final     MCHC 12/02/2022 31.8  31.5 - 36.5 g/dL Final     RDW 12/02/2022 12.6  10.0 - 15.0 % Final     Platelet Count 12/02/2022 219  150 - 450 10e3/uL Final     % Neutrophils 12/02/2022 69  % Final     % Lymphocytes 12/02/2022 20  % Final     % Monocytes 12/02/2022 8  % Final     % Eosinophils 12/02/2022 2  % Final     % Basophils 12/02/2022 1  % Final     % Immature Granulocytes 12/02/2022 0  % Final     NRBCs per 100 WBC 12/02/2022 0  <1 /100 Final     Absolute Neutrophils 12/02/2022 5.0  1.6 - 8.3 10e3/uL Final     Absolute Lymphocytes 12/02/2022 1.4  0.8 - 5.3 10e3/uL Final     Absolute Monocytes 12/02/2022 0.6  0.0 - 1.3 10e3/uL Final     Absolute Eosinophils 12/02/2022 0.2  0.0 - 0.7 10e3/uL Final     Absolute Basophils 12/02/2022 0.1  0.0 - 0.2 10e3/uL Final     Absolute Immature Granulocytes 12/02/2022 0.0  <=0.4 10e3/uL Final     Absolute NRBCs 12/02/2022 0.0  10e3/uL Final     Hold Specimen 12/02/2022 JIC   Final     Culture 12/02/2022 50,000-100,000 CFU/mL Mixture of urogenital annelise   Final       No results found for any visits on 12/16/22.    Assessment:     1. Medicare annual wellness visit, subsequent    2. Primary hypertension    3. Right pulmonary embolus (H)    4. Asthma-COPD overlap syndrome (H)    5. Osteoarthritis, unspecified osteoarthritis type, unspecified site    6. Hypercholesteremia    7. Paresthesia of left foot    8. Chronic GERD    9. Left lumbar pain    10. Left buttock pain         Plan:     1. Add in amlodipine (Norvasc) for  blood pressure control.  Follow up with me if not improving.  2. Consider further work-up of the back if worsening.  Patient wishes to follow at this time.  3. Recent blood work and tests reviewed.  4. Could consider electromyography (EMG) of the leg in the future if needed.  5. Continue current medications  6. Follow up sooner if issues.    No orders of the defined types were placed in this encounter.       A personalized health plan based on the identified health risks was provided to the patient on the AVS.       Agustin Okeefe MD  General Internal Medicine  [facility]      Return in about 1 year (around 12/16/2023), or if symptoms worsen or fail to improve, for annual wellness visit, visit and blood work.     No future appointments.      Health Maintenance   Topic Date Due     ZOSTER IMMUNIZATION (3 of 3) 03/06/2021     ANNUAL REVIEW OF HM ORDERS  12/13/2022     COPD ACTION PLAN  06/23/2062 (Originally 1940)     MEDICARE ANNUAL WELLNESS VISIT  12/16/2023     FALL RISK ASSESSMENT  12/16/2023     LIPID  12/13/2026     ADVANCE CARE PLANNING  12/16/2027     DEXA  12/13/2033     SPIROMETRY  Completed     PHQ-2 (once per calendar year)  Completed     INFLUENZA VACCINE  Completed     Pneumococcal Vaccine: 65+ Years  Completed     COVID-19 Vaccine  Completed     IPV IMMUNIZATION  Aged Out     MENINGITIS IMMUNIZATION  Aged Out     DTAP/TDAP/TD IMMUNIZATION  Discontinued      Wt Readings from Last 20 Encounters:   12/16/22 88.5 kg (195 lb)   12/02/22 86.6 kg (191 lb)   06/23/22 91.6 kg (202 lb)   04/13/22 91.6 kg (202 lb)   02/18/22 90.7 kg (200 lb)   12/13/21 90.9 kg (200 lb 6 oz)   09/08/21 92.2 kg (203 lb 4 oz)   02/17/15 91.6 kg (202 lb)     BP Readings from Last 20 Encounters:   12/18/22 138/88   12/02/22 (!) 196/92   06/23/22 126/72   04/13/22 122/70   02/28/22 136/72   02/18/22 (!) 142/70   12/13/21 (!) 142/80   09/08/21 (!) 158/86      Pulse Readings from Last 20 Encounters:    12/16/22 65   12/02/22 62   04/13/22 66   02/24/22 75   02/18/22 61   12/13/21 72   09/08/21 72     SpO2 Readings from Last 20 Encounters:   12/16/22 96%   12/02/22 99%   04/13/22 96%   02/24/22 98%   02/18/22 95%           The patient was provided with written information regarding signs of hearing loss.  Information on urinary incontinence and treatment options given to patient.  She is at risk for falling and has been provided with information to reduce the risk of falling at home.

## 2022-12-16 NOTE — PROGRESS NOTES
"  Are you in the first 12 months of your Medicare coverage?  No    Healthy Habits:     In general, how would you rate your overall health?  Good    Duration of exercise:  30-45 minutes    Do you usually eat at least 4 servings of fruit and vegetables a day, include whole grains    & fiber and avoid regularly eating high fat or \"junk\" foods?  Yes    Taking medications regularly:  Yes    Medication side effects:  Muscle aches    Ability to successfully perform activities of daily living:  No assistance needed    Home Safety:  No safety concerns identified    Hearing Impairment:  Need to ask people to speak up or repeat themselves and difficulty understanding soft or whispered speech    In the past 6 months, have you been bothered by leaking of urine? Yes    In general, how would you rate your overall mental or emotional health?  Good      PHQ-2 Total Score: 0      Have you ever done Advance Care Planning? (For example, a Health Directive, POLST, or a discussion with a medical provider or your loved ones about your wishes): Yes, advance care planning is on file.      Fall risk  Fallen 2 or more times in the past year?: No  Any fall with injury in the past year?: Yes      Cognitive Screening   1) Repeat 3 items (Leader, Season, Table)    2) Clock draw: NORMAL  3) 3 item recall: Recalls 3 objects  Results: 3 items recalled: COGNITIVE IMPAIRMENT LESS LIKELY    Mini-CogTM Copyright ENID Evans. Licensed by the author for use in [facility]; reprinted with permission (bonny@.[facility]). All rights reserved.      Do you have sleep apnea, excessive snoring or daytime drowsiness?: no    "

## 2022-12-18 VITALS
TEMPERATURE: 98.7 F | HEART RATE: 65 BPM | OXYGEN SATURATION: 96 % | BODY MASS INDEX: 32.49 KG/M2 | RESPIRATION RATE: 20 BRPM | WEIGHT: 195 LBS | HEIGHT: 65 IN | DIASTOLIC BLOOD PRESSURE: 88 MMHG | SYSTOLIC BLOOD PRESSURE: 138 MMHG

## 2022-12-18 PROBLEM — E66.01 MORBID OBESITY (H): Status: RESOLVED | Noted: 2020-03-28 | Resolved: 2022-12-18

## 2022-12-19 NOTE — PATIENT INSTRUCTIONS
Lifestyle modifications to reduce blood pressure    1. Lose weight.  A weight loss of 20 pounds can help reduce your blood pressure as much as 5-20 points.    2. Aerobic exercise.  30 minutes of aerobic exercise (for example, walking briskly, swimming, biking, running) on most days of the week can reduce blood pressure as much as 4-9 points.  You should also try to walk 1 mile per day above your current activity level.    3. Limit alcohol intake.  If you are male, you should consume no more than 2 mixed drinks, two 12-ounce cans of beer, or two 4-ounce glasses of wine daily.  If you are female, you should consume half this amount.  This can decrease blood pressure by 2-4 points.    4. Limit salt intake.  Keeping salt intake less than 2.4 grams a day may help some people drop their blood pressure by 2-8 points.    5. Consider the DASH diet.  A diet rich in fruits, vegetables, and low-fat dairy and which emphasizes reduced saturated and total fat might decrease blood pressure by 4-14 points.    Also, while not studied in the past, management of stress and anxiety can often have positive effects on blood pressure.      ______________________________________________________________________     Preventive Health Recommendations    See your health care provider every year (or as recommended) to:  Review health changes.   Discuss preventive care.    Review your medicines and supplements.  Consider having a mammogram at least every 2 years between the ages of 50 and 75 years old.  Yearly if desired.  Consider colon cancer screening between the ages of 50 and 75 years old if necessary.    Cholesterol and diabetes testing as necessary.  At age 65, consider haveing a bone density scan (DEXA) to check for osteoporosis.    Shots:  COVID vaccination if you have not had it yet.  Get a flu shot each year.  Get a tetanus shot every 10 years if you are under the age of 80 years old.  Talk to your doctor about a one time pneumonia  vaccine that is recommended at the age of 65 years old.  Talk to your pharmacist about the shingles vaccine.  This is often better covered in the pharmacy through your insurance than if you have it in the clinic.    Lifestyle  Exercise at least 150 minutes a week (30 minutes a day, 5 days a week) if you are able. This will help you control your weight and prevent disease.  Limit alcohol to one drink per day.  No smoking.   Wear sunscreen to prevent skin cancer.   See your dentist twice a year for an exam and cleaning.  See your eye doctor every 1 to 2 years to screen for conditions such as glaucoma, macular degeneration and cataracts.    Personalized Prevention Plan  You are due for the preventive services outlined below.  Your care team is available to assist you in scheduling these services.  If you have already completed any of these items, please share that information with your care team to update in your medical record.    Health Maintenance   Topic Date Due    ZOSTER IMMUNIZATION (3 of 3) 03/06/2021    ANNUAL REVIEW OF HM ORDERS  12/13/2022    COPD ACTION PLAN  06/23/2062 (Originally 1940)    MEDICARE ANNUAL WELLNESS VISIT  12/16/2023    FALL RISK ASSESSMENT  12/16/2023    LIPID  12/13/2026    ADVANCE CARE PLANNING  12/16/2027    DEXA  12/13/2033    SPIROMETRY  Completed    PHQ-2 (once per calendar year)  Completed    INFLUENZA VACCINE  Completed    Pneumococcal Vaccine: 65+ Years  Completed    COVID-19 Vaccine  Completed    IPV IMMUNIZATION  Aged Out    MENINGITIS IMMUNIZATION  Aged Out    DTAP/TDAP/TD IMMUNIZATION  Discontinued          Patient Education     Signs of Hearing Loss      Hearing much better with one ear can be a sign of hearing loss.   Hearing loss is a problem shared by many people. In fact, it is one of the most common health problems, particularly as people age. Most people age 65 and older have some hearing loss. By age 80, almost everyone does. Hearing loss often occurs slowly over  the years. So you may not realize your hearing has gotten worse.  Have your hearing checked  Call your healthcare provider if you:  Have to strain to hear normal conversation  Have to watch other people s faces very carefully to follow what they re saying  Need to ask people to repeat what they ve said  Often misunderstand what people are saying  Turn the volume of the television or radio up so high that others complain  Feel that people are mumbling when they re talking to you  Find that the effort to hear leaves you feeling tired and irritated  Notice, when using the phone, that you hear better with one ear than the other  [employer] last reviewed this educational content on 1/1/2020 2000-2021 [employer]. All rights reserved. This information is not intended as a substitute for professional medical care. Always follow your healthcare professional's instructions.          Urinary Incontinence, Female (Adult)   Urinary incontinence means loss of bladder control. This problem affects many women, especially as they get older. If you have incontinence, you may be embarrassed to ask for help. But know that this problem can be treated.   Types of Incontinence  There are different types of incontinence. Two of the main types are described here. You can have more than one type.   Stress incontinence. With this type, urine leaks when pressure (stress) is put on the bladder. This may happen when you cough, sneeze, or laugh. Stress incontinence most often occurs because the pelvic floor muscles that support the bladder and urethra are weak. This can happen after pregnancy and vaginal childbirth or a hysterectomy. It can also be due to excess body weight or hormone changes.  Urge incontinence (also called overactive bladder). With this type, a sudden urge to urinate is felt often. This may happen even though there may not be much urine in the bladder. The need to urinate often during the night is common. Urge  incontinence most often occurs because of bladder spasms. This may be due to bladder irritation or infection. Damage to bladder nerves or pelvic muscles, constipation, and certain medicines can also lead to urge incontinence.  Treatment depends on the cause. Further evaluation is needed to find the type you have. This will likely include an exam and certain tests. Based on the results, you and your healthcare provider can then plan treatment. Until a diagnosis is made, the home care tips below can help ease symptoms.   Home care  Do pelvic floor muscle exercises, if they are prescribed. The pelvic floor muscles help support the bladder and urethra. Many women find that their symptoms improve when doing special exercises that strengthen these muscles. To do the exercises, contract the muscles you would use to stop your stream of urine. But do this when you re not urinating. Hold for 10 seconds, then relax. Repeat 10 to 20 times in a row, at least 3 times a day. Your healthcare provider may give you other instructions for how to do the exercises and how often.  Keep a bladder diary. This helps track how often and how much you urinate over a set period of time. Bring this diary with you to your next visit with the provider. The information can help your provider learn more about your bladder problem.  Lose weight, if advised to by your provider. Extra weight puts pressure on the bladder. Your provider can help you create a weight-loss plan that s right for you. This may include exercising more and making certain diet changes.  Don't have foods and drinks that may irritate the bladder. These can include alcohol and caffeinated drinks.  Quit smoking. Smoking and other tobacco use can lead to a long-term (chronic) cough that strains the pelvic floor muscles. Smoking may also damage the bladder and urethra. Talk with your provider about treatments or methods you can use to quit smoking.  If drinking large amounts of fluid  makes you have symptoms, you may be advised to limit your fluid intake. You may also be advised to drink most of your fluids during the day and to limit fluids at night.  If you re worried about urine leakage or accidents, you may wear absorbent pads to catch urine. Change the pads often. This helps reduce discomfort. It may also reduce the risk of skin or bladder infections.    Follow-up care  Follow up with your healthcare provider, or as directed. It may take some to find the right treatment for your problem. But healthy lifestyle changes can be made right away. These include such things as exercising on a regular basis, eating a healthy diet, losing weight (if needed), and quitting smoking. Your treatment plan may include special therapies or medicines. Certain procedures or surgery may also be options. Talk about any questions you have with your provider.   When to seek medical advice  Call the healthcare provider right away if any of these occur:  Fever of 100.4 F (38 C) or higher, or as directed by your provider  Bladder pain or fullness  Belly swelling  Nausea or vomiting  Back pain  Weakness, dizziness, or fainting  [employer] last reviewed this educational content on 1/1/2020 2000-2021 [employer]. All rights reserved. This information is not intended as a substitute for professional medical care. Always follow your healthcare professional's instructions.          Preventing Falls at Home  A person can fall for many reasons. Older adults may fall because reaction time slows down as we age. Your muscles and joints may get stiff, weak, or less flexible because of illness, medicines, or a physical condition.   Other health problems that make falls more likely include:   Arthritis  Dizziness or lightheadedness when you stand up (orthostatic hypotension)  History of a stroke  Dizziness  Anemia  Certain medicines taken for mental illness or to control blood pressure.  Problems with balance or  gait  Bladder or urinary problems  History of falling  Changes in vision (vision impairment)  Changes in thinking skills and memory (cognitive impairment)  Injuries from a fall can include serious injuries such as broken bones, dislocated joints, internal bleeding and cuts. Injuries like these can limit your independence.   Prevention tips  To help prevent falls and fall-related injuries, follow the tips below.    Floors  To make floors safer:   Put nonskid pads under area rugs.  Remove small rugs.  Replace worn floor coverings.  Tack carpets firmly to each step on carpeted stairs. Put nonskid strips on the edges of uncarpeted stairs.  Keep floors and stairs free of clutter and cords.  Arrange furniture so there are clear pathways.  Clean up any spills right away.  Bathrooms    To make bathrooms safer:   Install grab bars in the tub or shower.  Apply nonskid strips or put a nonskid rubber mat in the tub or shower.  Sit on a bath chair to bathe.  Use bathmats with nonskid backing.  Lighting  To improve visibility in your home:    Keep a flashlight in each room. Or put a lamp next to the bed within easy reach.  Put nightlights in the bedrooms, hallways, kitchen, and bathrooms.  Make sure all stairways have good lighting.  Take your time when going up and down stairs.  Put handrails on both sides of stairs and in walkways for more support. To prevent injury to your wrist or arm, don t use handrails to pull yourself up.  Install grab bars to pull yourself up.  Move or rearrange items that you use often. This will make them easier to find or reach.  Look at your home to find any safety hazards. Especially look at doorways, walkways, and the driveway. Remove or repair any safety problems that you find.  Other changes to make  Look around to find any safety hazards. Look closely at doorways, walkways, and the driveway. Remove or repair any safety problems that you find.  Wear shoes that fit well.  Take your time when going  up and down stairs.  Put handrails on both sides of stairs and in walkways for more support. To prevent injury to your wrist or arm, don t use handrails to pull yourself up.  Install grab bars wherever needed to pull yourself up.  Arrange items that you use often. This will make them easier to find or reach.    [employer] last reviewed this educational content on 3/1/2020    2573-6697 [employer]. All rights reserved. This information is not intended as a substitute for professional medical care. Always follow your healthcare professional's instructions.

## 2023-01-02 ENCOUNTER — NURSE TRIAGE (OUTPATIENT)
Dept: NURSING | Facility: CLINIC | Age: 83
End: 2023-01-02

## 2023-01-02 DIAGNOSIS — J45.50 SEVERE PERSISTENT ASTHMA, UNCOMPLICATED (H): ICD-10-CM

## 2023-01-02 NOTE — TELEPHONE ENCOUNTER
"Called patient- patient states she is feeling better, \"just tired.\" Patient will continue taking medication and states she would monitor at home and see how she feels. Patient would call clinic back if she has any new symptoms or changes to update.     Patient thanked for the call.   "

## 2023-01-02 NOTE — TELEPHONE ENCOUNTER
Has been taking new prescription of Amlodipine x 2 weeks. Hasn't had any issues prior.     Last night, patient reports stabbing pain to left chest around 0200. and was feeling better around 0330.     BP at 0230 116/75 with HR 59    Today 124/87 with HR 64    Pain has not returned. Wondering if she should continue taking amlodipine or if this pain is not related. Patient currently not having any symptoms and feels at baseline. Encouraged to go to ED if chest pain returns. Encouraged patient to continue taking medication as prescribed.    Message routed to PCP.    Mary Arnold RN on 1/2/2023 at 2:30 PM    Reason for Disposition    Caller has NON-URGENT medicine question about med that PCP or specialist prescribed and triager unable to answer question    Additional Information    Negative: Intentional drug overdose and suicidal thoughts or ideas    Negative: Drug overdose and triager unable to answer question    Negative: Caller requesting a renewal or refill of a medicine patient is currently taking    Negative: Caller requesting information unrelated to medicine    Negative: Caller requesting information about COVID-19 Vaccine    Negative: Caller requesting information about Emergency Contraception    Negative: Caller requesting information about Combined Birth Control Pills    Negative: Caller requesting information about Progestin Birth Control Pills    Negative: Caller requesting information about Post-Op pain or medicines    Negative: Caller requesting a prescription antibiotic (such as penicillin) for Strep throat and has a positive culture result    Negative: Caller requesting a prescription anti-viral med (such as Tamiflu) and has influenza (flu) symptoms    Negative: Immunization reaction suspected    Negative: Rash while taking a medicine or within 3 days of stopping it    Negative: Asthma and having symptoms of asthma (cough, wheezing, etc.)    Negative: Symptom of illness (e.g., headache, abdominal  pain, earache, vomiting) that are more than mild    Negative: Breastfeeding questions about mother's medicines and diet    Negative: MORE THAN A DOUBLE DOSE of a prescription or over-the-counter (OTC) drug    Negative: DOUBLE DOSE (an extra dose or lesser amount) of prescription drug and any symptoms (e.g., dizziness, nausea, pain, sleepiness)    Negative: DOUBLE DOSE (an extra dose or lesser amount) of over-the-counter (OTC) drug and any symptoms (e.g., dizziness, nausea, pain, sleepiness)    Negative: Took another person's prescription drug    Negative: DOUBLE DOSE (an extra dose or lesser amount) of prescription drug and NO symptoms  (Exception: A double dose of antibiotics.)    Negative: Diabetes drug error or overdose (e.g., took wrong type of insulin or took extra dose)    Negative: Caller has medication question about med NOT prescribed by PCP and triager unable to answer question (e.g., compatibility with other med, storage)    Negative: Prescription not at pharmacy and was prescribed by PCP recently  (Exception: triager has access to EMR and prescription is recorded there. Go to Home Care and confirm for pharmacy.)    Negative: Pharmacy calling with prescription question and triager unable to answer question    Negative: Caller has URGENT medicine question about med that PCP or specialist prescribed and triager unable to answer question    Protocols used: MEDICATION QUESTION CALL-A-[place]

## 2023-01-02 NOTE — TELEPHONE ENCOUNTER
Not likely related to the medication.  Continue the medication.    Can offer a reserved slot to review this if she would like to be seen.    Otherwise, other recommendations by nurse triage okay.    Agustin Okeefe MD  General Internal Medicine  [facility]  1/2/2023, 2:50 PM

## 2023-01-05 RX ORDER — BECLOMETHASONE DIPROPIONATE HFA 80 UG/1
AEROSOL, METERED RESPIRATORY (INHALATION)
Qty: 21.2 G | Refills: 3 | Status: SHIPPED | OUTPATIENT
Start: 2023-01-05 | End: 2024-02-14

## 2023-04-18 ENCOUNTER — OFFICE VISIT (OUTPATIENT)
Dept: PULMONOLOGY | Facility: CLINIC | Age: 83
End: 2023-04-18
Payer: COMMERCIAL

## 2023-04-18 VITALS
BODY MASS INDEX: 32.95 KG/M2 | OXYGEN SATURATION: 97 % | DIASTOLIC BLOOD PRESSURE: 80 MMHG | HEART RATE: 60 BPM | WEIGHT: 198 LBS | SYSTOLIC BLOOD PRESSURE: 128 MMHG

## 2023-04-18 DIAGNOSIS — J44.89 ASTHMA-COPD OVERLAP SYNDROME (H): Primary | ICD-10-CM

## 2023-04-18 DIAGNOSIS — R06.09 DYSPNEA ON EXERTION: ICD-10-CM

## 2023-04-18 PROCEDURE — 99214 OFFICE O/P EST MOD 30 MIN: CPT | Performed by: NURSE PRACTITIONER

## 2023-04-18 RX ORDER — FLUTICASONE PROPIONATE AND SALMETEROL XINAFOATE 230; 21 UG/1; UG/1
2 AEROSOL, METERED RESPIRATORY (INHALATION) 2 TIMES DAILY
Qty: 8 G | Refills: 11 | Status: SHIPPED | OUTPATIENT
Start: 2023-04-18 | End: 2023-10-13

## 2023-04-18 ASSESSMENT — ASTHMA QUESTIONNAIRES
ACT_TOTALSCORE: 22
ACT_TOTALSCORE: 22
QUESTION_5 LAST FOUR WEEKS HOW WOULD YOU RATE YOUR ASTHMA CONTROL: WELL CONTROLLED
QUESTION_1 LAST FOUR WEEKS HOW MUCH OF THE TIME DID YOUR ASTHMA KEEP YOU FROM GETTING AS MUCH DONE AT WORK, SCHOOL OR AT HOME: NONE OF THE TIME
QUESTION_3 LAST FOUR WEEKS HOW OFTEN DID YOUR ASTHMA SYMPTOMS (WHEEZING, COUGHING, SHORTNESS OF BREATH, CHEST TIGHTNESS OR PAIN) WAKE YOU UP AT NIGHT OR EARLIER THAN USUAL IN THE MORNING: NOT AT ALL
QUESTION_4 LAST FOUR WEEKS HOW OFTEN HAVE YOU USED YOUR RESCUE INHALER OR NEBULIZER MEDICATION (SUCH AS ALBUTEROL): ONCE A WEEK OR LESS
QUESTION_2 LAST FOUR WEEKS HOW OFTEN HAVE YOU HAD SHORTNESS OF BREATH: ONCE OR TWICE A WEEK

## 2023-04-18 NOTE — PATIENT INSTRUCTIONS
It was a pleasure to see you in clinic today.   Here is what we discussed:    Stop Qvar, start Advair 2 puffs, twice daily.  Rinse/gargle after use.  Continue Albuterol every 6 hours as needed for shortness of breath or wheezing.  Call us with any change or worsening of your breathing.  Follow-up in one year.    Piper Olivier, CNP  Pulmonary Medicine  [facility]  [phone]

## 2023-04-18 NOTE — PROGRESS NOTES
Pulmonary Clinic Follow-Up          Assessment/Plan:     82 year old lady with a history of asthma/reactive airway disease, obesity, HTN, HLD, former minimal smoking history, hx of small PE in 2020 treated with NOAC therapy, presents for asthma/COPD annual follow-up.    Asthma - COPD overlap  PFTs showed normal spirometry with borderline reduced FEV1/FVC ratio, positive response to bronchodilator, evidence of small airways disease as well as moderately impaired diffusing capacity. The latter is likely due second hand smoke exposure and emphysema, which was confirmed on her CT scan from 2020. She probably has some asthma/COPD overlap features given the bronchodilator responsiveness and borderline reduction in FEV1/FVC ratio.  She reports increase in GUERRERO and episodes of having trouble catching her breath.    Plan:  - increase regimen from Qvar to Advair (fluticasone/salmeterol) 230/21, two puffs BID, rinse/gargle after use.    - continue albuterol rescue inhaler/neb as needed  - she is UTD with COVID vaccines and booster, annual influenza vaccine, and pneumococcal vaccines.   - Action plan: prednisone 40mg x5 days    Follow-up  - annually    Piper Olivier, CNP  Pulmonary Medicine  [facility]  [phone]         CC:     asthma/COPD follow-up     HPI:     82 year old lady with a history of asthma/reactive airway disease, obesity, HTN, HLD, former minimal smoking history, hx of small PE in 2020 treated with NOAC therapy, presents for asthma/COPD annual follow-up.    Since last visit, she states she has had increase in her GUERRERO with house work (has to sit and rest), and also reports episodes of difficulty catching her breath.  Triggers for reactive airways include cigarette smoke, tar, any chemicals.  No URIs or exacerbations in the past year.  Rare albuterol use.          12/13/2021    11:30 AM 4/13/2022     1:00 PM 4/18/2023    10:00 AM   ACT Total Scores   ACT TOTAL SCORE (Goal  "Greater than or Equal to 20) 16 22 22   In the past 12 months, how many times did you visit the emergency room for your asthma without being admitted to the hospital? 0 0 0   In the past 12 months, how many times were you hospitalized overnight because of your asthma? 0 0 0          ROS:     10-point ROS performed and is negative aside from those listed in HPI.     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 Right upper quadrant pain.  COMPARISON: None.  TECHNIQUE: CT angiogram chest during arterial phase injection IV contrast. 2D and 3D MIP reconstructions were performed by the CT technologist. Dose reduction techniques were used.  CONTRAST: Iohexol (Omni) 100 mL   FINDIN       PSH:  Past Surgical History:   Procedure Laterality Date     ANKLE SURGERY Left      CHOLECYSTECTOMY  2020     FOOT SURGERY Left      HYSTERECTOMY  01/01/1998     TONSILLECTOMY & ADENOIDECTOMY Bilateral      TOTAL HIP ARTHROPLASTY Left        Allergies:  Allergies   Allergen Reactions     Mold      Other reaction(s): Runny Nose, Wheezing     Morphine      Other reaction(s): Hallucinations     Oxycodone Other (See Comments)     Too strong.      Perfume      Other reaction(s): Runny Nose, Wheezing     Tobacco [Nicotiana Tabacum]      Other reaction(s): Runny Nose, Wheezing    \"Smoke\"     Valacyclovir Unknown and Hives     " Azithromycin Nausea and Vomiting and Rash     And fever reported.       Family HX:  Family History   Problem Relation Age of Onset     Crohn's Disease Mother      Hypertension Mother      Kidney Cancer Father      Lung Cancer Sister      Cancer Brother         Metastatic parotid gland tumor     Coronary Artery Disease Brother      Dementia Brother      Breast Cancer Maternal Grandmother 30        bilateral mastectomy done yrs ago.     Colon Cancer Paternal Grandmother      Atrial fibrillation Son      Cardiomyopathy Son      Coronary Artery Disease Son      No Known Problems Son      No Known Problems Daughter        Social Hx:  Social History     Socioeconomic History     Marital status: Single     Spouse name: Not on file     Number of children: Not on file     Years of education: Not on file     Highest education level: Not on file   Occupational History     Not on file   Tobacco Use     Smoking status: Former     Packs/day: 1.00     Years: 6.00     Pack years: 6.00     Types: Cigarettes     Quit date: 1975     Years since quittin.3     Smokeless tobacco: Never   Vaping Use     Vaping status: Never Used   Substance and Sexual Activity     Alcohol use: Yes     Alcohol/week: 0.8 standard drinks of alcohol     Comment: Alcoholic Drinks/day: occ.     Drug use: Not Currently     Types: Marijuana     Sexual activity: Not on file   Other Topics Concern     Not on file   Social History Narrative    3 childen, 2 sons and 1 daughter.        Previous long term patient of Dr. Piper Greene.     Social Determinants of Health     Financial Resource Strain: Not on file   Food Insecurity: Not on file   Transportation Needs: Not on file   Physical Activity: Not on file   Stress: Not on file   Social Connections: Not on file   Intimate Partner Violence: Not on file   Housing Stability: Not on file       Current Meds:  Current Outpatient Medications   Medication Sig Dispense Refill     acetaminophen (TYLENOL) 500 MG  tablet Take 500 mg by mouth every 8 hours as needed for mild pain       amLODIPine (NORVASC) 5 MG tablet Take 1 tablet (5 mg) by mouth every evening Take with the pravastatin (Pravachol) 90 tablet 3     atenolol (TENORMIN) 25 MG tablet TAKE 1 TABLET BY MOUTH EVERY MORNING 90 tablet 2     calcium carbonate (OS-STERLING) 600 mg calcium (1,500 mg) tablet [CALCIUM CARBONATE (OS-STERLING) 600 MG CALCIUM (1,500 MG) TABLET] Take 600 mg by mouth daily.       CHOLECALCIFEROL PO Take 2,000 Units by mouth daily       diphenhydrAMINE-acetaminophen (TYLENOL PM)  MG tablet Take 1 tablet by mouth nightly as needed for sleep       lisinopril (ZESTRIL) 40 MG tablet TAKE 1 TABLET BY MOUTH EVERY MORNING. 90 tablet 2     multivitamin therapeutic (THERAGRAN) tablet [MULTIVITAMIN THERAPEUTIC (THERAGRAN) TABLET] Take 1 tablet by mouth every morning.       pravastatin (PRAVACHOL) 40 MG tablet TAKE 1 TABLET BY MOUTH EVERY MORNING. 90 tablet 2     PROAIR RESPICLICK 108 (90 Base) MCG/ACT inhaler INHALE 1 PUFF BY MOUTH EVERY 4 HOURS AS NEEDED 1 each 6     QVAR REDIHALER 80 MCG/ACT inhaler INHALE 1 PUFF BY MOUTH 2 TIMES DAILY. DOESN'T NEED A SPACER OR SHAKING. 21.2 g 3        Physical Exam:       /80 (BP Location: Left arm, Patient Position: Chair, Cuff Size: Adult Regular)   Pulse 60   Wt 89.8 kg (198 lb)   SpO2 97%   BMI 32.95 kg/m    Gen: elderly female, appears in NAD  CV: RRR, no M/G/R  Resp: CTA bilaterally, no wheezes.  Respirations even and unlabored.  On RA.  No cough.  Skin: no apparent rashes  Ext: no cyanosis, clubbing or edema  Neuro: alert, nonfocal       Data:       Labs:  Chem panel OK  CBC normal, no diff.  hgb 14.0    Imaging studies:    CTA chest from 2020:  IMPRESSION:  1.  Pulmonary emboli right lower lobe.  2.  Small clot burden.  3.  No evidence of heart strain.  4.  Trace right basilar effusion.  5.  Normal right basilar atelectasis.  6.  No aneurysm or dissection involving the thoracic aorta    Pulmonary Function  Testing  4/13/2022  FEV1 1.59L, 78% (> LLN)  FVC 90%  +BD response  Ratio 0.66 (> LLN)  FEF 25-75% 53% with +BD response  %  DLco 54% roseline for hgb.  Flow volume loop suggests small airways disease.

## 2023-05-09 ENCOUNTER — OFFICE VISIT (OUTPATIENT)
Dept: INTERNAL MEDICINE | Facility: CLINIC | Age: 83
End: 2023-05-09
Payer: COMMERCIAL

## 2023-05-09 VITALS
HEIGHT: 65 IN | TEMPERATURE: 97.7 F | BODY MASS INDEX: 32.01 KG/M2 | WEIGHT: 192.1 LBS | OXYGEN SATURATION: 98 % | HEART RATE: 68 BPM | RESPIRATION RATE: 16 BRPM

## 2023-05-09 DIAGNOSIS — M54.50 LUMBAR BACK PAIN: ICD-10-CM

## 2023-05-09 DIAGNOSIS — I10 PRIMARY HYPERTENSION: Primary | ICD-10-CM

## 2023-05-09 DIAGNOSIS — R19.7 DIARRHEA, UNSPECIFIED TYPE: ICD-10-CM

## 2023-05-09 DIAGNOSIS — I26.99 RIGHT PULMONARY EMBOLUS (H): ICD-10-CM

## 2023-05-09 DIAGNOSIS — R07.89 LEFT-SIDED CHEST WALL PAIN: ICD-10-CM

## 2023-05-09 DIAGNOSIS — J44.89 ASTHMA-COPD OVERLAP SYNDROME (H): ICD-10-CM

## 2023-05-09 PROCEDURE — 99215 OFFICE O/P EST HI 40 MIN: CPT | Performed by: INTERNAL MEDICINE

## 2023-05-09 RX ORDER — IRBESARTAN 300 MG/1
300 TABLET ORAL DAILY
Qty: 90 TABLET | Refills: 3 | Status: SHIPPED | OUTPATIENT
Start: 2023-05-09 | End: 2024-04-21

## 2023-05-09 ASSESSMENT — PAIN SCALES - GENERAL: PAINLEVEL: MILD PAIN (3)

## 2023-05-09 NOTE — PROGRESS NOTES
[facility]    Comprehensive and complex medical care - Chronic disease management - Shared decision making - Care coordination - Compassionate care    Patient advocacy - Rational deprescribing - Minimally disruptive medicine - Ethical focus - Customized care         Date of Service: 5/9/2023  Primary Provider: Agustin Okeefe    Patient Care Team:  Agustin Okeefe MD as PCP - General (Internal Medicine)  Manish Morales MD as Assigned Pulmonology Provider  Agustin Oekefe MD as Assigned PCP          Patient's Pharmacy:    [facility]/pharmacy #[id number] - [place], [place] - [address] [address]  [address] [address]  Phone: [phone] Fax: [phone]     Patient's Contacts:  Name Home Phone Work Phone Mobile Phone Relationship Lgl WHITNEY Vu [phone]   Other      Patient's Insurance:    Payor: [employer] / Plan: [employer] MEDICARE / Product Type: HMO /            Active Problem List:  Problem List as of 5/9/2023 Reviewed: 12/18/2022  8:41 PM by Agustin Okeefe MD       High    Former smoker, stopped smoking in distant past    Right pulmonary embolus (H)       Medium    HTN (hypertension)    Asthma-COPD overlap syndrome (H)       Low    Hypercholesteremia    Chronic GERD    Paresthesia of left foot       Other    Osteoarthritis    Acute blood loss anemia        Current Outpatient Medications   Medication Instructions     acetaminophen (TYLENOL) 500 mg, Oral, EVERY 8 HOURS PRN     amLODIPine (NORVASC) 5 mg, Oral, EVERY EVENING, Take with the pravastatin (Pravachol)     atenolol (TENORMIN) 25 MG tablet TAKE 1 TABLET BY MOUTH EVERY MORNING     calcium 600 mg, DAILY     CHOLECALCIFEROL PO 2,000 Units, Oral, DAILY     diphenhydrAMINE-acetaminophen (TYLENOL PM)  MG tablet 1 tablet, Oral, AT BEDTIME PRN     fluticasone-salmeterol (ADVAIR-HFA) 230-21 MCG/ACT inhaler 2 puffs, Inhalation, 2 TIMES DAILY     irbesartan (AVAPRO) 300 mg, Oral, DAILY      lisinopril (ZESTRIL) 40 MG tablet TAKE 1 TABLET BY MOUTH EVERY MORNING.     multivitamin therapeutic (THERAGRAN) tablet 1 tablet, EVERY MORNING     pravastatin (PRAVACHOL) 40 MG tablet TAKE 1 TABLET BY MOUTH EVERY MORNING.     PROAIR RESPICLICK 108 (90 Base) MCG/ACT inhaler INHALE 1 PUFF BY MOUTH EVERY 4 HOURS AS NEEDED     QVAR REDIHALER 80 MCG/ACT inhaler INHALE 1 PUFF BY MOUTH 2 TIMES DAILY. DOESN'T NEED A SPACER OR SHAKING.      Social History     Social History Narrative    3 childen, 2 sons and 1 daughter.        Previous long term patient of Dr. Piper Greene.       Subjective:     Aranza Mcmanus is a 83 year old female who comes in today for:    Chief Complaint   Patient presents with     Follow Up     Breast Pain     Pt states she is feeling pain on the side of her left breast - she states she took a hard fall last year, and messed up her left side. She's wondering if it's related.           5/9/2023     2:50 PM   Additional Questions   Roomed by Gurwinder HAWKINS   Accompanied by N/A     In for follow up multiple issues.    First reviewed some gastrointestinal issues she had but is better.  This occurred 3 weeks ago.  Did end up developing nausea, vomiting and diarrhea and felt she had a stomach influenza.  Did have some gastroesophageal reflux disease (GERD) symptoms.  Has history of pancreatitis and did not have any typical symptoms of this.  Now no issue.    Has some pain in the left axillary line of the chest off and on.  No other symptoms.    Right low back pain in the last couple of weeks.  Paraspinous.  Sharp pain.  Worse with bending over.  Better in the last 2 days.  No numbness or tingling.  Can occasionally feel into the leg.    Reviewed her blood pressure and we checked her blood pressure cuffs out and they appear to work versus the manual.  She has an arm and a wrist cuff.    Some cough lately.    We reviewed her other issues noted in the assessment but not specifically addressed in the HPI  "above.     Objective:     Wt Readings from Last 3 Encounters:   05/09/23 87.1 kg (192 lb 1.6 oz)   04/18/23 89.8 kg (198 lb)   12/16/22 88.5 kg (195 lb)     BP Readings from Last 3 Encounters:   04/18/23 128/80   12/18/22 138/88   12/02/22 (!) 196/92     Pulse 68   Temp 97.7  F (36.5  C) (Oral)   Resp 16   Ht 1.651 m (5' 5\")   Wt 87.1 kg (192 lb 1.6 oz)   LMP  (LMP Unknown)   SpO2 98%   BMI 31.97 kg/m     The patient is comfortable, no acute distress.  Mood good.  Insight good.  Eyes are nonicteric.  Some bluish changes in the nose.   Neck is supple without mass.  No cervical adenopathy.  No thyromegaly. Heart regular rate and rhythm.  Lungs clear to auscultation bilaterally.  Respiratory effort is good.  Abdomen soft and nontender.  No hepatosplenomegaly.  Extremities no edema.      Diagnostics:     Admission on 12/02/2022, Discharged on 12/02/2022   Component Date Value Ref Range Status     D-Dimer Quantitative 12/02/2022 0.49  0.00 - 0.50 ug/mL FEU Final     Sodium 12/02/2022 139  136 - 145 mmol/L Final     Potassium 12/02/2022 4.3  3.4 - 5.3 mmol/L Final     Chloride 12/02/2022 105  98 - 107 mmol/L Final     Carbon Dioxide (CO2) 12/02/2022 26  22 - 29 mmol/L Final     Anion Gap 12/02/2022 8  7 - 15 mmol/L Final     Urea Nitrogen 12/02/2022 21.9  8.0 - 23.0 mg/dL Final     Creatinine 12/02/2022 1.03 (H)  0.51 - 0.95 mg/dL Final     Calcium 12/02/2022 9.6  8.8 - 10.2 mg/dL Final     Glucose 12/02/2022 97  70 - 99 mg/dL Final     Alkaline Phosphatase 12/02/2022 74  35 - 104 U/L Final     AST 12/02/2022 22  10 - 35 U/L Final     ALT 12/02/2022 25  10 - 35 U/L Final     Protein Total 12/02/2022 6.8  6.4 - 8.3 g/dL Final     Albumin 12/02/2022 4.6  3.5 - 5.2 g/dL Final     Bilirubin Total 12/02/2022 0.4  <=1.2 mg/dL Final     GFR Estimate 12/02/2022 55 (L)  >60 mL/min/1.73m2 Final    Effective December 21, 2021 eGFRcr in adults is calculated using the 2021 CKD-EPI creatinine equation which includes age and " gender (Dana moreno al., [facility], DOI: 10.1056/AMCGbc8613519)     Lipase 12/02/2022 51  13 - 60 U/L Final     Color Urine 12/02/2022 Yellow  Colorless, Straw, Light Yellow, Yellow Final     Appearance Urine 12/02/2022 Clear  Clear Final     Glucose Urine 12/02/2022 Negative  Negative mg/dL Final     Bilirubin Urine 12/02/2022 Negative  Negative Final     Ketones Urine 12/02/2022 Negative  Negative mg/dL Final     Specific Gravity Urine 12/02/2022 1.025  1.001 - 1.030 Final     Blood Urine 12/02/2022 Negative  Negative Final     pH Urine 12/02/2022 5.0  5.0 - 7.0 Final     Protein Albumin Urine 12/02/2022 Negative  Negative mg/dL Final     Urobilinogen Urine 12/02/2022 <2.0  <2.0 mg/dL Final     Nitrite Urine 12/02/2022 Negative  Negative Final     Leukocyte Esterase Urine 12/02/2022 250 Keren/uL (A)  Negative Final     Mucus Urine 12/02/2022 Present (A)  None Seen /LPF Final     RBC Urine 12/02/2022 0  <=2 /HPF Final     WBC Urine 12/02/2022 24 (H)  <=5 /HPF Final     Squamous Epithelials Urine 12/02/2022 3 (H)  <=1 /HPF Final     WBC Count 12/02/2022 7.2  4.0 - 11.0 10e3/uL Final     RBC Count 12/02/2022 5.10  3.80 - 5.20 10e6/uL Final     Hemoglobin 12/02/2022 15.3  11.7 - 15.7 g/dL Final     Hematocrit 12/02/2022 48.1 (H)  35.0 - 47.0 % Final     MCV 12/02/2022 94  78 - 100 fL Final     MCH 12/02/2022 30.0  26.5 - 33.0 pg Final     MCHC 12/02/2022 31.8  31.5 - 36.5 g/dL Final     RDW 12/02/2022 12.6  10.0 - 15.0 % Final     Platelet Count 12/02/2022 219  150 - 450 10e3/uL Final     % Neutrophils 12/02/2022 69  % Final     % Lymphocytes 12/02/2022 20  % Final     % Monocytes 12/02/2022 8  % Final     % Eosinophils 12/02/2022 2  % Final     % Basophils 12/02/2022 1  % Final     % Immature Granulocytes 12/02/2022 0  % Final     NRBCs per 100 WBC 12/02/2022 0  <1 /100 Final     Absolute Neutrophils 12/02/2022 5.0  1.6 - 8.3 10e3/uL Final     Absolute Lymphocytes 12/02/2022 1.4  0.8 - 5.3 10e3/uL Final     Absolute Monocytes  12/02/2022 0.6  0.0 - 1.3 10e3/uL Final     Absolute Eosinophils 12/02/2022 0.2  0.0 - 0.7 10e3/uL Final     Absolute Basophils 12/02/2022 0.1  0.0 - 0.2 10e3/uL Final     Absolute Immature Granulocytes 12/02/2022 0.0  <=0.4 10e3/uL Final     Absolute NRBCs 12/02/2022 0.0  10e3/uL Final     Hold Specimen 12/02/2022 JIC   Final     Culture 12/02/2022 50,000-100,000 CFU/mL Mixture of urogenital annelise   Final       No results found for any visits on 05/09/23.     Assessment:     1. Primary hypertension    2. Right pulmonary embolus (H)   No signs of recurrence.  Continue to monitor.   3. Asthma-COPD overlap syndrome (H)    4. Diarrhea, unspecified type    5. Left-sided chest wall pain    6. Lumbar back pain        Plan:     1. No further studies at this time but follow up if worsening.  2. Lots of separate issues today that took time to review.  3. Continue current medications otherwise.  4. Follow up sooner if issues.    No orders of the defined types were placed in this encounter.       40 minutes or greater was spent today on the patient's care on the day of service.      This includes time for chart preparation, reviewing medical tests done before or during the visit, talking with the patient, review of quality indicators, required documentation, and other elements of care.        Agustin Okeefe MD  General Internal Medicine  [facility]    Return in about 7 months (around 12/17/2023) for annual wellness visit.     No future appointments.

## 2023-05-09 NOTE — PROGRESS NOTES
Wt Readings from Last 20 Encounters:   05/09/23 87.1 kg (192 lb 1.6 oz)   04/18/23 89.8 kg (198 lb)   12/16/22 88.5 kg (195 lb)   12/02/22 86.6 kg (191 lb)   06/23/22 91.6 kg (202 lb)   04/13/22 91.6 kg (202 lb)   02/18/22 90.7 kg (200 lb)   12/13/21 90.9 kg (200 lb 6 oz)   09/08/21 92.2 kg (203 lb 4 oz)   02/17/15 91.6 kg (202 lb)     BP Readings from Last 20 Encounters:   04/18/23 128/80   12/18/22 138/88   12/02/22 (!) 196/92   06/23/22 126/72   04/13/22 122/70   02/28/22 136/72   02/18/22 (!) 142/70   12/13/21 (!) 142/80   09/08/21 (!) 158/86      Pulse Readings from Last 20 Encounters:   05/09/23 68   04/18/23 60   12/16/22 65   12/02/22 62   04/13/22 66   02/24/22 75   02/18/22 61   12/13/21 72   09/08/21 72     SpO2 Readings from Last 20 Encounters:   05/09/23 98%   04/18/23 97%   12/16/22 96%   12/02/22 99%   04/13/22 96%   02/24/22 98%   02/18/22 95%

## 2023-05-17 NOTE — PATIENT INSTRUCTIONS
No future appointments.     Preventative Measures:  Health Maintenance   Topic Date Due    ZOSTER IMMUNIZATION (3 of 3) 03/06/2021    ANNUAL REVIEW OF HM ORDERS  12/13/2022    COVID-19 Vaccine (6 - Pfizer series) 02/25/2023    COPD ACTION PLAN  06/23/2062 (Originally 1940)    MEDICARE ANNUAL WELLNESS VISIT  12/16/2023    FALL RISK ASSESSMENT  12/16/2023    ADVANCE CARE PLANNING  12/18/2027    DEXA  12/13/2033    SPIROMETRY  Completed    PHQ-2 (once per calendar year)  Completed    INFLUENZA VACCINE  Completed    Pneumococcal Vaccine: 65+ Years  Completed    IPV IMMUNIZATION  Aged Out    MENINGITIS IMMUNIZATION  Aged Out    DTAP/TDAP/TD IMMUNIZATION  Discontinued

## 2023-06-29 ENCOUNTER — OFFICE VISIT (OUTPATIENT)
Dept: INTERNAL MEDICINE | Facility: CLINIC | Age: 83
End: 2023-06-29
Payer: COMMERCIAL

## 2023-06-29 VITALS
DIASTOLIC BLOOD PRESSURE: 84 MMHG | SYSTOLIC BLOOD PRESSURE: 126 MMHG | WEIGHT: 196.3 LBS | RESPIRATION RATE: 16 BRPM | HEART RATE: 60 BPM | HEIGHT: 65 IN | TEMPERATURE: 97.9 F | BODY MASS INDEX: 32.71 KG/M2 | OXYGEN SATURATION: 96 %

## 2023-06-29 DIAGNOSIS — J44.89 ASTHMA-COPD OVERLAP SYNDROME (H): ICD-10-CM

## 2023-06-29 DIAGNOSIS — H25.13 AGE-RELATED NUCLEAR CATARACT OF BOTH EYES: ICD-10-CM

## 2023-06-29 DIAGNOSIS — R25.2 MUSCLE CRAMPS: ICD-10-CM

## 2023-06-29 DIAGNOSIS — I10 PRIMARY HYPERTENSION: Chronic | ICD-10-CM

## 2023-06-29 DIAGNOSIS — I26.99 RIGHT PULMONARY EMBOLUS (H): ICD-10-CM

## 2023-06-29 DIAGNOSIS — Z01.818 PREOPERATIVE EXAMINATION: Primary | ICD-10-CM

## 2023-06-29 PROCEDURE — 99214 OFFICE O/P EST MOD 30 MIN: CPT | Performed by: INTERNAL MEDICINE

## 2023-06-29 ASSESSMENT — PAIN SCALES - GENERAL: PAINLEVEL: NO PAIN (0)

## 2023-06-29 NOTE — PROGRESS NOTES
[facility]  Primary Care Specialists    Care coordination - Customized care -  Comprehensive and complex medical care            Date of Service: 6/29/2023  Primary Provider: Agustin Okeefe    Patient Care Team:  Agustin Okeefe MD as PCP - General (Internal Medicine)  Manish Morales MD as Assigned Pulmonology Provider  Agustin Okeefe MD as Assigned PCP  Debi Santiago MD as MD (Ophthalmology)            Patient's Pharmacy:    [id number] - [place] [id number] [place] [id number] [address]  Phone: [phone] Fax: [phone]     Patient's Contacts:  Name Home Phone Work Phone Mobile Phone Relationship Lgl WHITNEY Vu [phone]   Other        Patient's Insurance:    Payor: [facility] / Plan: [employer] MEDICARE / Product Type: HMO /           Preoperative examination    Aranza Mcmanus is a 83 year old female (1940) who I am asked to see by her surgeon regarding her fitness for upcoming surgery.      Chief Complaint   Patient presents with     Pre-Op Exam        Subjective:     In for preop evaluation before bilateral cataract surgery.    Loss of vision gradual in both eyes.    Reviewed her blood pressure and this is doing well.  No cardiac symptoms.    Next reviewed her chronic obstructive pulmonary disease (COPD) and this is stable.  Sensitive to air at times.    Having some recently increased leg cramps lately.  Trying to be more active.  Usually helped with drinking water and eating vegetables.  ______________________________________________________________________         6/29/2023     3:10 PM   Pre-op Questionnaire   1. Have you ever had a heart attack or stroke? No   2. Have you ever had surgery on your heart or blood vessels, such as a stent placement, a coronary artery bypass, or surgery on an artery in your head, neck, heart, or legs? No   3. Do you have chest pain with activity? No   4. Do you have a history of  heart failure? No   5. Do  you currently have a cold, bronchitis or symptoms of other infection? No   6. Do you have a cough, shortness of breath, or wheezing? No   7. Do you or anyone in your family have previous history of blood clots? YES - History of pulmonary embolism (PE) in 2020   8. Do you or does anyone in your family have a serious bleeding problem such as prolonged bleeding following surgeries or cuts? UNKNOWN    9. Have you ever had problems with anemia or been told to take iron pills? YES    10. Have you had any abnormal blood loss such as black, tarry or bloody stools, or abnormal vaginal bleeding? UNKNOWN   11. Have you ever had a blood transfusion? No   12. Are you willing to have a blood transfusion if it is medically needed before, during, or after your surgery? Yes   13. Have you or any of your relatives ever had problems with anesthesia? YES - History of personal issues with coming out of anesthesia   14. Do you have sleep apnea, excessive snoring or daytime drowsiness? YES    14a. Do you have a CPAP machine? No   15. Do you have any artifical heart valves or other implanted medical devices like a pacemaker, defibrillator, or continuous glucose monitor? No   16. Do you have artificial joints? YES    17. Are you allergic to latex? No     ______________________________________________________________________     We reviewed her other issues noted in the assessment but not specifically addressed in the HPI above.   ______________________________________________________________________     Patient Active Problem List   Diagnosis     HTN (hypertension)     Hypercholesteremia     Osteoarthritis     Right pulmonary embolus (H)     Chronic GERD     Paresthesia of left foot     Former smoker, stopped smoking in distant past     Asthma-COPD overlap syndrome (H)       Past Surgical History:   Procedure Laterality Date     ANKLE SURGERY Left      CHOLECYSTECTOMY  2020     FOOT SURGERY Left      HYSTERECTOMY  01/01/1998      TONSILLECTOMY & ADENOIDECTOMY Bilateral      TOTAL HIP ARTHROPLASTY Left       Social History     Occupational History     Not on file   Tobacco Use     Smoking status: Former     Packs/day: 1.00     Years: 6.00     Pack years: 6.00     Types: Cigarettes     Quit date: 1975     Years since quittin.5     Smokeless tobacco: Never   Vaping Use     Vaping Use: Never used   Substance and Sexual Activity     Alcohol use: Yes     Alcohol/week: 0.8 standard drinks of alcohol     Comment: Alcoholic Drinks/day: occ.     Drug use: Not Currently     Types: Marijuana     Sexual activity: Not on file     Social History     Social History Narrative    3 childen, 2 sons and 1 daughter.        Previous long term patient of Dr. Piper Greene.      Family History   Problem Relation Age of Onset     Crohn's Disease Mother      Hypertension Mother      Kidney Cancer Father      Lung Cancer Sister      Cancer Brother         Metastatic parotid gland tumor     Coronary Artery Disease Brother      Dementia Brother      Breast Cancer Maternal Grandmother 30        bilateral mastectomy done yrs ago.     Colon Cancer Paternal Grandmother      Atrial fibrillation Son      Cardiomyopathy Son      Coronary Artery Disease Son      No Known Problems Son      No Known Problems Daughter       Family history is noncontributory otherwise.    Allergies: Mold, Morphine, Oxycodone, Perfume, Tobacco [tobacco], Valacyclovir, and Azithromycin     Current medications:  Current Outpatient Medications   Medication Instructions     acetaminophen (TYLENOL) 500 mg, Oral, EVERY 8 HOURS PRN     amLODIPine (NORVASC) 5 mg, Oral, EVERY EVENING, Take with the pravastatin (Pravachol)     atenolol (TENORMIN) 25 MG tablet TAKE 1 TABLET BY MOUTH EVERY MORNING     calcium 600 mg, DAILY     CHOLECALCIFEROL PO 2,000 Units, Oral, DAILY     diphenhydrAMINE-acetaminophen (TYLENOL PM)  MG tablet 1 tablet, Oral, AT BEDTIME PRN     fluticasone-salmeterol  "(ADVAIR-HFA) 230-21 MCG/ACT inhaler 2 puffs, Inhalation, 2 TIMES DAILY     irbesartan (AVAPRO) 300 mg, Oral, DAILY     multivitamin therapeutic (THERAGRAN) tablet 1 tablet, EVERY MORNING     pravastatin (PRAVACHOL) 40 MG tablet TAKE 1 TABLET BY MOUTH EVERY MORNING.     PROAIR RESPICLICK 108 (90 Base) MCG/ACT inhaler INHALE 1 PUFF BY MOUTH EVERY 4 HOURS AS NEEDED     QVAR REDIHALER 80 MCG/ACT inhaler INHALE 1 PUFF BY MOUTH 2 TIMES DAILY. DOESN'T NEED A SPACER OR SHAKING.        Objective:     Wt Readings from Last 3 Encounters:   06/29/23 89 kg (196 lb 4.8 oz)   05/09/23 87.1 kg (192 lb 1.6 oz)   04/18/23 89.8 kg (198 lb)     BP Readings from Last 3 Encounters:   06/29/23 126/84   04/18/23 128/80   12/18/22 138/88     /84 (BP Location: Right arm, Patient Position: Sitting, Cuff Size: Adult Regular)   Pulse 60   Temp 97.9  F (36.6  C) (Oral)   Resp 16   Ht 1.651 m (5' 5\")   Wt 89 kg (196 lb 4.8 oz)   LMP  (LMP Unknown)   SpO2 96%   BMI 32.67 kg/m     Patient is in no acute distress.  Mood good.  Insight good.  Eyes nonicteric.  Pupils equal.  Ears clear.  Nose clear.  Throat clear.  Neck is supple.  No cervical adenopathy.  No thyromegaly.  Heart is regular rate and rhythm.  Lungs clear to auscultation bilaterally.  Respiratory effort is good.  Extremities no edema.     Diagnostics:     Admission on 12/02/2022, Discharged on 12/02/2022   Component Date Value Ref Range Status     D-Dimer Quantitative 12/02/2022 0.49  0.00 - 0.50 ug/mL FEU Final     Sodium 12/02/2022 139  136 - 145 mmol/L Final     Potassium 12/02/2022 4.3  3.4 - 5.3 mmol/L Final     Chloride 12/02/2022 105  98 - 107 mmol/L Final     Carbon Dioxide (CO2) 12/02/2022 26  22 - 29 mmol/L Final     Anion Gap 12/02/2022 8  7 - 15 mmol/L Final     Urea Nitrogen 12/02/2022 21.9  8.0 - 23.0 mg/dL Final     Creatinine 12/02/2022 1.03 (H)  0.51 - 0.95 mg/dL Final     Calcium 12/02/2022 9.6  8.8 - 10.2 mg/dL Final     Glucose 12/02/2022 97  70 - 99 " mg/dL Final     Alkaline Phosphatase 12/02/2022 74  35 - 104 U/L Final     AST 12/02/2022 22  10 - 35 U/L Final     ALT 12/02/2022 25  10 - 35 U/L Final     Protein Total 12/02/2022 6.8  6.4 - 8.3 g/dL Final     Albumin 12/02/2022 4.6  3.5 - 5.2 g/dL Final     Bilirubin Total 12/02/2022 0.4  <=1.2 mg/dL Final     GFR Estimate 12/02/2022 55 (L)  >60 mL/min/1.73m2 Final    Effective December 21, 2021 eGFRcr in adults is calculated using the 2021 CKD-EPI creatinine equation which includes age and gender (Dana et al., NE, DOI: 10.1056/RIXXnr0369376)     Lipase 12/02/2022 51  13 - 60 U/L Final     Color Urine 12/02/2022 Yellow  Colorless, Straw, Light Yellow, Yellow Final     Appearance Urine 12/02/2022 Clear  Clear Final     Glucose Urine 12/02/2022 Negative  Negative mg/dL Final     Bilirubin Urine 12/02/2022 Negative  Negative Final     Ketones Urine 12/02/2022 Negative  Negative mg/dL Final     Specific Gravity Urine 12/02/2022 1.025  1.001 - 1.030 Final     Blood Urine 12/02/2022 Negative  Negative Final     pH Urine 12/02/2022 5.0  5.0 - 7.0 Final     Protein Albumin Urine 12/02/2022 Negative  Negative mg/dL Final     Urobilinogen Urine 12/02/2022 <2.0  <2.0 mg/dL Final     Nitrite Urine 12/02/2022 Negative  Negative Final     Leukocyte Esterase Urine 12/02/2022 250 Keren/uL (A)  Negative Final     Mucus Urine 12/02/2022 Present (A)  None Seen /LPF Final     RBC Urine 12/02/2022 0  <=2 /HPF Final     WBC Urine 12/02/2022 24 (H)  <=5 /HPF Final     Squamous Epithelials Urine 12/02/2022 3 (H)  <=1 /HPF Final     WBC Count 12/02/2022 7.2  4.0 - 11.0 10e3/uL Final     RBC Count 12/02/2022 5.10  3.80 - 5.20 10e6/uL Final     Hemoglobin 12/02/2022 15.3  11.7 - 15.7 g/dL Final     Hematocrit 12/02/2022 48.1 (H)  35.0 - 47.0 % Final     MCV 12/02/2022 94  78 - 100 fL Final     MCH 12/02/2022 30.0  26.5 - 33.0 pg Final     MCHC 12/02/2022 31.8  31.5 - 36.5 g/dL Final     RDW 12/02/2022 12.6  10.0 - 15.0 % Final      Platelet Count 12/02/2022 219  150 - 450 10e3/uL Final     % Neutrophils 12/02/2022 69  % Final     % Lymphocytes 12/02/2022 20  % Final     % Monocytes 12/02/2022 8  % Final     % Eosinophils 12/02/2022 2  % Final     % Basophils 12/02/2022 1  % Final     % Immature Granulocytes 12/02/2022 0  % Final     NRBCs per 100 WBC 12/02/2022 0  <1 /100 Final     Absolute Neutrophils 12/02/2022 5.0  1.6 - 8.3 10e3/uL Final     Absolute Lymphocytes 12/02/2022 1.4  0.8 - 5.3 10e3/uL Final     Absolute Monocytes 12/02/2022 0.6  0.0 - 1.3 10e3/uL Final     Absolute Eosinophils 12/02/2022 0.2  0.0 - 0.7 10e3/uL Final     Absolute Basophils 12/02/2022 0.1  0.0 - 0.2 10e3/uL Final     Absolute Immature Granulocytes 12/02/2022 0.0  <=0.4 10e3/uL Final     Absolute NRBCs 12/02/2022 0.0  10e3/uL Final     Hold Specimen 12/02/2022 JIC   Final     Culture 12/02/2022 50,000-100,000 CFU/mL Mixture of urogenital annelise   Final       Impression:     1. Preoperative examination    2. Age-related nuclear cataract of both eyes    3. Right pulmonary embolus (H)    4. Primary hypertension    5. Asthma-COPD overlap syndrome (H)    6. Muscle cramps        Plan:     1. Okay to proceed with surgery as planned.  2. Use inhalers on the am of surgery.  3. Take atenolol on the am of surgery, but not the irbesartan (Avapro).  4. Continue current medications otherwise.  5. Follow up if any issues in the interim.         Agustin Okeefe MD  General Internal Medicine  [facility]    Return in about 25 weeks (around 12/21/2023) for annual wellness visit, visit and blood work.     Future Appointments   Date Time Provider Department Center   12/21/2023 12:00 PM Agustin Okeefe MD [facility]

## 2023-06-30 NOTE — PATIENT INSTRUCTIONS
Future Appointments   Date Time Provider Department Center   12/21/2023 12:00 PM [facility]

## 2023-08-08 DIAGNOSIS — E78.00 HYPERCHOLESTEREMIA: ICD-10-CM

## 2023-08-08 DIAGNOSIS — I10 BENIGN ESSENTIAL HYPERTENSION: ICD-10-CM

## 2023-08-08 RX ORDER — ATENOLOL 25 MG/1
25 TABLET ORAL EVERY MORNING
Qty: 90 TABLET | Refills: 2 | Status: SHIPPED | OUTPATIENT
Start: 2023-08-08 | End: 2023-10-13

## 2023-08-08 RX ORDER — PRAVASTATIN SODIUM 40 MG
40 TABLET ORAL EVERY MORNING
Qty: 90 TABLET | Refills: 2 | Status: SHIPPED | OUTPATIENT
Start: 2023-08-08 | End: 2024-04-29

## 2023-08-08 NOTE — TELEPHONE ENCOUNTER
"Routing refill request to provider for review/approval because:  Labs not current:  LDL  Patient needs to be seen because it has been more than 1 year since last office visit.    Last Written Prescription Date:  11/22/22  Last Fill Quantity: 90,  # refills: 2   Last office visit provider:  6/29/23     Requested Prescriptions   Pending Prescriptions Disp Refills    pravastatin (PRAVACHOL) 40 MG tablet [Pharmacy Med Name: PRAVASTATIN SODIUM 40 MG TAB] 90 tablet 2     Sig: TAKE 1 TABLET BY MOUTH EVERY DAY IN THE MORNING       Statins Protocol Failed - 8/8/2023 11:26 AM        Failed - LDL on file in past 12 months     Recent Labs   Lab Test 12/13/21  1129   LDL 84             Failed - Recent (12 mo) or future (30 days) visit within the authorizing provider's specialty     Patient has had an office visit with the authorizing provider or a provider within the authorizing providers department within the previous 12 mos or has a future within next 30 days. See \"Patient Info\" tab in inbasket, or \"Choose Columns\" in Meds & Orders section of the refill encounter.              Passed - No abnormal creatine kinase in past 12 months     No lab results found.             Passed - Medication is active on med list        Passed - Patient is age 18 or older        Passed - No active pregnancy on record        Passed - No positive pregnancy test in past 12 months          atenolol (TENORMIN) 25 MG tablet [Pharmacy Med Name: ATENOLOL 25 MG TABLET] 90 tablet 2     Sig: TAKE 1 TABLET BY MOUTH EVERY DAY IN THE MORNING       Beta-Blockers Protocol Failed - 8/8/2023 11:26 AM        Failed - Recent (12 mo) or future (30 days) visit within the authorizing provider's specialty     Patient has had an office visit with the authorizing provider or a provider within the authorizing providers department within the previous 12 mos or has a future within next 30 days. See \"Patient Info\" tab in inbasket, or \"Choose Columns\" in Meds & Orders section " of the refill encounter.              Passed - Blood pressure under 140/90 in past 12 months     BP Readings from Last 3 Encounters:   06/29/23 126/84   04/18/23 128/80   12/18/22 138/88                 Passed - Patient is age 6 or older        Passed - Medication is active on med list             Cha Ferguson 08/08/23 2:23 PM

## 2023-09-07 ENCOUNTER — ALLIED HEALTH/NURSE VISIT (OUTPATIENT)
Dept: FAMILY MEDICINE | Facility: CLINIC | Age: 83
End: 2023-09-07
Payer: COMMERCIAL

## 2023-09-07 DIAGNOSIS — Z23 ENCOUNTER FOR IMMUNIZATION: Primary | ICD-10-CM

## 2023-09-07 PROCEDURE — 99207 PR NO CHARGE NURSE ONLY: CPT

## 2023-09-07 PROCEDURE — 90662 IIV NO PRSV INCREASED AG IM: CPT

## 2023-09-07 PROCEDURE — G0008 ADMIN INFLUENZA VIRUS VAC: HCPCS

## 2023-09-19 ENCOUNTER — ANCILLARY PROCEDURE (OUTPATIENT)
Dept: MAMMOGRAPHY | Facility: CLINIC | Age: 83
End: 2023-09-19
Attending: INTERNAL MEDICINE
Payer: COMMERCIAL

## 2023-09-19 DIAGNOSIS — Z12.31 VISIT FOR SCREENING MAMMOGRAM: ICD-10-CM

## 2023-09-19 PROCEDURE — 77067 SCR MAMMO BI INCL CAD: CPT

## 2023-10-13 ENCOUNTER — OFFICE VISIT (OUTPATIENT)
Dept: INTERNAL MEDICINE | Facility: CLINIC | Age: 83
End: 2023-10-13
Payer: COMMERCIAL

## 2023-10-13 VITALS
TEMPERATURE: 97.9 F | SYSTOLIC BLOOD PRESSURE: 128 MMHG | RESPIRATION RATE: 16 BRPM | BODY MASS INDEX: 33.66 KG/M2 | DIASTOLIC BLOOD PRESSURE: 72 MMHG | OXYGEN SATURATION: 96 % | HEART RATE: 58 BPM | HEIGHT: 65 IN | WEIGHT: 202 LBS

## 2023-10-13 DIAGNOSIS — Z29.11 NEED FOR VACCINATION AGAINST RESPIRATORY SYNCYTIAL VIRUS: ICD-10-CM

## 2023-10-13 DIAGNOSIS — J44.89 ASTHMA-COPD OVERLAP SYNDROME (H): ICD-10-CM

## 2023-10-13 DIAGNOSIS — Z98.42 S/P BILATERAL CATARACT EXTRACTION: ICD-10-CM

## 2023-10-13 DIAGNOSIS — I10 PRIMARY HYPERTENSION: Primary | Chronic | ICD-10-CM

## 2023-10-13 DIAGNOSIS — Z98.41 S/P BILATERAL CATARACT EXTRACTION: ICD-10-CM

## 2023-10-13 PROCEDURE — 91320 SARSCV2 VAC 30MCG TRS-SUC IM: CPT | Performed by: INTERNAL MEDICINE

## 2023-10-13 PROCEDURE — 90480 ADMN SARSCOV2 VAC 1/ONLY CMP: CPT | Performed by: INTERNAL MEDICINE

## 2023-10-13 PROCEDURE — 99214 OFFICE O/P EST MOD 30 MIN: CPT | Performed by: INTERNAL MEDICINE

## 2023-10-13 RX ORDER — RESPIRATORY SYNCYTIAL VIRUS VACCINE 120MCG/0.5
0.5 KIT INTRAMUSCULAR ONCE
Qty: 1 EACH | Refills: 0 | Status: CANCELLED | OUTPATIENT
Start: 2023-10-13 | End: 2023-10-13

## 2023-10-13 RX ORDER — PREDNISOLONE ACETATE 10 MG/ML
SUSPENSION/ DROPS OPHTHALMIC
COMMUNITY
Start: 2023-07-17 | End: 2024-08-15

## 2023-10-13 NOTE — PROGRESS NOTES
"  {PROVIDER CHARTING PREFERENCE:506829}    Tila Cobian is a 83 year old, presenting for the following health issues:  Recheck Medication      10/13/2023     9:25 AM   Additional Questions   Roomed by damina giron   Accompanied by self       History of Present Illness       Hypertension: She presents for follow up of hypertension.  She does not check blood pressure  regularly outside of the clinic. Outside blood pressures have been over 140/90. She does not follow a low salt diet.     Reason for visit:  Medication check    She eats 4 or more servings of fruits and vegetables daily.She consumes 1 sweetened beverage(s) daily.She exercises with enough effort to increase her heart rate 60 or more minutes per day.  She exercises with enough effort to increase her heart rate 3 or less days per week.   She is taking medications regularly.       {MA/LPN/RN Pre-Provider Visit Orders- hCG/UA/Strep (Optional):927784}  {SUPERLIST (Optional):289888}  {additonal problems for provider to add (Optional):205768}      Review of Systems   {ROS COMP (Optional):657293}      Objective    /72 (BP Location: Right arm, Patient Position: Sitting, Cuff Size: Adult Regular)   Pulse 58   Temp 97.9  F (36.6  C) (Oral)   Resp 16   Ht 1.651 m (5' 5\")   Wt 91.6 kg (202 lb)   LMP  (LMP Unknown)   SpO2 96%   BMI 33.61 kg/m    Body mass index is 33.61 kg/m .  Physical Exam   {Exam List (Optional):454503}    {Diagnostic Test Results (Optional):021458}    {AMBULATORY ATTESTATION (Optional):438561}              "

## 2023-10-13 NOTE — PROGRESS NOTES
[facility]    Comprehensive and complex medical care - Chronic disease management - Shared decision making - Care coordination - Compassionate care    Patient advocacy - Rational deprescribing - Minimally disruptive medicine - Ethical focus - Customized care         Date of Service: 10/13/2023  Primary Provider: Agustin Okeefe    Patient Care Team:  Agustin Okeefe MD as PCP - General (Internal Medicine)  Manish Morales MD as Assigned Pulmonology Provider  Agustin Okeefe MD as Assigned PCP  Debi Santiago MD as MD (Ophthalmology)          Patient's Pharmacy:    [facility]/pharmacy #[id number] - [place], [place] - [address]  [address]  [facility] [address]  Phone: [phone] Fax: [phone]     Patient's Contacts:  Name Home Phone Work Phone Mobile Phone Relationship Lgl WHITNEY Vu [phone]   Other      Patient's Insurance:    Payor: UCARE / Plan: [employer] MEDICARE / Product Type: HMO /            Active Problem List:  Problem List as of 10/13/2023 Reviewed: 10/13/2023  8:52 PM by Agustin Okeefe MD         High    Former smoker, stopped smoking in distant past    Right pulmonary embolus (H)       Medium    Primary hypertension    Osteoarthritis    Asthma-COPD overlap syndrome       Low    Hypercholesteremia    Chronic GERD    Paresthesia of left foot        Current Outpatient Medications   Medication Instructions    acetaminophen (TYLENOL) 500 mg, Oral, EVERY 8 HOURS PRN    amLODIPine (NORVASC) 5 mg, Oral, EVERY EVENING, Take with the pravastatin (Pravachol)    calcium 600 mg, DAILY    CHOLECALCIFEROL PO 2,000 Units, Oral, DAILY    diphenhydrAMINE-acetaminophen (TYLENOL PM)  MG tablet 1 tablet, Oral, AT BEDTIME PRN    irbesartan (AVAPRO) 300 mg, Oral, DAILY    multivitamin therapeutic (THERAGRAN) tablet 1 tablet, EVERY MORNING    pravastatin (PRAVACHOL) 40 mg, Oral, EVERY MORNING    prednisoLONE acetate (PRED FORTE) 1 % ophthalmic  "suspension INSTILL 1 DROP INTO RIGHT EYE 4 TIMES A DAY. REDUCE BY 1 DROP EVERY 7 DAYS. START AFTER SURGERY.    PROAIR RESPICLICK 108 (90 Base) MCG/ACT inhaler INHALE 1 PUFF BY MOUTH EVERY 4 HOURS AS NEEDED    QVAR REDIHALER 80 MCG/ACT inhaler INHALE 1 PUFF BY MOUTH 2 TIMES DAILY. DOESN'T NEED A SPACER OR SHAKING.      Social History     Social History Narrative    3 childen, 2 sons and 1 daughter.        Previous long term patient of Dr. Piper Greene.       Subjective:     Aranza Mcmanus is a 83 year old female who comes in today for:    Chief Complaint   Patient presents with    Recheck Medication          10/13/2023     9:25 AM   Additional Questions   Roomed by damian giron   Accompanied by self     Mainly wanted to follow up on her hypertension.     She realized she is on 3 blood pressure medications.  Blood pressure has been doing well.  Wonders whether there is an option to discontinue one.  No chest pain or chest pressure, edema or shortness of breath.  No headaches.    Bilateral cataract extraction okay but some issues with the left eye.    Reviewed her cholesterol.    Reviewed her chronic obstructive pulmonary disease (COPD).  Does get some dyspnea with walking halls.    Some nasal drainage and intermittent left sore throat.  Occasionally some green yellow phlegm.     We reviewed her other issues noted in the assessment but not specifically addressed in the HPI above.     Objective:     Wt Readings from Last 3 Encounters:   10/13/23 91.6 kg (202 lb)   06/29/23 89 kg (196 lb 4.8 oz)   05/09/23 87.1 kg (192 lb 1.6 oz)     BP Readings from Last 3 Encounters:   10/13/23 128/72   06/29/23 126/84   04/18/23 128/80     /72 (BP Location: Right arm, Patient Position: Sitting, Cuff Size: Adult Regular)   Pulse 58   Temp 97.9  F (36.6  C) (Oral)   Resp 16   Ht 1.651 m (5' 5\")   Wt 91.6 kg (202 lb)   LMP  (LMP Unknown)   SpO2 96%   BMI 33.61 kg/m     The patient is comfortable, no acute distress.  Mood " good.  Insight good.  Eyes are nonicteric.  Neck is supple without mass.  No cervical adenopathy.  No thyromegaly. Heart regular rate and rhythm.  Lungs clear to auscultation bilaterally.  Respiratory effort is good.  Extremities no edema.      Diagnostics:     Admission on 12/02/2022, Discharged on 12/02/2022   Component Date Value Ref Range Status    D-Dimer Quantitative 12/02/2022 0.49  0.00 - 0.50 ug/mL FEU Final    Sodium 12/02/2022 139  136 - 145 mmol/L Final    Potassium 12/02/2022 4.3  3.4 - 5.3 mmol/L Final    Chloride 12/02/2022 105  98 - 107 mmol/L Final    Carbon Dioxide (CO2) 12/02/2022 26  22 - 29 mmol/L Final    Anion Gap 12/02/2022 8  7 - 15 mmol/L Final    Urea Nitrogen 12/02/2022 21.9  8.0 - 23.0 mg/dL Final    Creatinine 12/02/2022 1.03 (H)  0.51 - 0.95 mg/dL Final    Calcium 12/02/2022 9.6  8.8 - 10.2 mg/dL Final    Glucose 12/02/2022 97  70 - 99 mg/dL Final    Alkaline Phosphatase 12/02/2022 74  35 - 104 U/L Final    AST 12/02/2022 22  10 - 35 U/L Final    ALT 12/02/2022 25  10 - 35 U/L Final    Protein Total 12/02/2022 6.8  6.4 - 8.3 g/dL Final    Albumin 12/02/2022 4.6  3.5 - 5.2 g/dL Final    Bilirubin Total 12/02/2022 0.4  <=1.2 mg/dL Final    GFR Estimate 12/02/2022 55 (L)  >60 mL/min/1.73m2 Final    Effective December 21, 2021 eGFRcr in adults is calculated using the 2021 CKD-EPI creatinine equation which includes age and gender (Dana et al., NEJM, DOI: 10.1056/SQKZrg4424345)    Lipase 12/02/2022 51  13 - 60 U/L Final    Color Urine 12/02/2022 Yellow  Colorless, Straw, Light Yellow, Yellow Final    Appearance Urine 12/02/2022 Clear  Clear Final    Glucose Urine 12/02/2022 Negative  Negative mg/dL Final    Bilirubin Urine 12/02/2022 Negative  Negative Final    Ketones Urine 12/02/2022 Negative  Negative mg/dL Final    Specific Gravity Urine 12/02/2022 1.025  1.001 - 1.030 Final    Blood Urine 12/02/2022 Negative  Negative Final    pH Urine 12/02/2022 5.0  5.0 - 7.0 Final    Protein Albumin  Urine 12/02/2022 Negative  Negative mg/dL Final    Urobilinogen Urine 12/02/2022 <2.0  <2.0 mg/dL Final    Nitrite Urine 12/02/2022 Negative  Negative Final    Leukocyte Esterase Urine 12/02/2022 250 Keren/uL (A)  Negative Final    Mucus Urine 12/02/2022 Present (A)  None Seen /LPF Final    RBC Urine 12/02/2022 0  <=2 /HPF Final    WBC Urine 12/02/2022 24 (H)  <=5 /HPF Final    Squamous Epithelials Urine 12/02/2022 3 (H)  <=1 /HPF Final    WBC Count 12/02/2022 7.2  4.0 - 11.0 10e3/uL Final    RBC Count 12/02/2022 5.10  3.80 - 5.20 10e6/uL Final    Hemoglobin 12/02/2022 15.3  11.7 - 15.7 g/dL Final    Hematocrit 12/02/2022 48.1 (H)  35.0 - 47.0 % Final    MCV 12/02/2022 94  78 - 100 fL Final    MCH 12/02/2022 30.0  26.5 - 33.0 pg Final    MCHC 12/02/2022 31.8  31.5 - 36.5 g/dL Final    RDW 12/02/2022 12.6  10.0 - 15.0 % Final    Platelet Count 12/02/2022 219  150 - 450 10e3/uL Final    % Neutrophils 12/02/2022 69  % Final    % Lymphocytes 12/02/2022 20  % Final    % Monocytes 12/02/2022 8  % Final    % Eosinophils 12/02/2022 2  % Final    % Basophils 12/02/2022 1  % Final    % Immature Granulocytes 12/02/2022 0  % Final    NRBCs per 100 WBC 12/02/2022 0  <1 /100 Final    Absolute Neutrophils 12/02/2022 5.0  1.6 - 8.3 10e3/uL Final    Absolute Lymphocytes 12/02/2022 1.4  0.8 - 5.3 10e3/uL Final    Absolute Monocytes 12/02/2022 0.6  0.0 - 1.3 10e3/uL Final    Absolute Eosinophils 12/02/2022 0.2  0.0 - 0.7 10e3/uL Final    Absolute Basophils 12/02/2022 0.1  0.0 - 0.2 10e3/uL Final    Absolute Immature Granulocytes 12/02/2022 0.0  <=0.4 10e3/uL Final    Absolute NRBCs 12/02/2022 0.0  10e3/uL Final    Hold Specimen 12/02/2022 JIC   Final    Culture 12/02/2022 50,000-100,000 CFU/mL Mixture of urogenital annelise   Final       No results found for any visits on 10/13/23.     Assessment:     1. Primary hypertension    2. Need for vaccination against respiratory syncytial virus    3. Asthma-COPD overlap syndrome    4. S/P  bilateral cataract extraction        Plan:     Covid booster done today.  Discussed medications and the atenolol would be the easiest to try off of and less effective especially as long term outcomes go.  Recheck blood pressure next visit.  Blood work next visit.  Would like vitamin D level done.  Continue current medications otherwise.  Follow up sooner if issues.    Orders Placed This Encounter   Procedures    COVID-19 12+ (2023-24) (PFIZER)        30 minutes or greater was spent today on the patient's care on the day of service.      This includes time for chart preparation, reviewing medical tests done before or during the visit, talking with the patient, review of quality indicators, required documentation, and other elements of care.        Agustin Okeefe MD  General Internal Medicine  [facility]    Return in about 2 months (around 12/21/2023) for annual wellness visit.     Future Appointments   Date Time Provider Department Center   10/19/2023 10:30 AM Ashley Mon MD MDOBGY [facility]   10/26/2023 10:00 AM MPLW MA/LPN [facility]   12/21/2023 12:00 PM [facility]       Wt Readings from Last 20 Encounters:   10/13/23 91.6 kg (202 lb)   06/29/23 89 kg (196 lb 4.8 oz)   05/09/23 87.1 kg (192 lb 1.6 oz)   04/18/23 89.8 kg (198 lb)   12/16/22 88.5 kg (195 lb)   12/02/22 86.6 kg (191 lb)   06/23/22 91.6 kg (202 lb)   04/13/22 91.6 kg (202 lb)   02/18/22 90.7 kg (200 lb)   12/13/21 90.9 kg (200 lb 6 oz)   09/08/21 92.2 kg (203 lb 4 oz)   02/17/15 91.6 kg (202 lb)     BP Readings from Last 20 Encounters:   10/13/23 128/72   06/29/23 126/84   04/18/23 128/80   12/18/22 138/88   12/02/22 (!) 196/92   06/23/22 126/72   04/13/22 122/70   02/28/22 136/72   02/18/22 (!) 142/70   12/13/21 (!) 142/80   09/08/21 (!) 158/86      Pulse Readings from Last 20 Encounters:   10/13/23 58   06/29/23 60   05/09/23 68   04/18/23 60   12/16/22 65   12/02/22 62   04/13/22 66    02/24/22 75   02/18/22 61   12/13/21 72   09/08/21 72     SpO2 Readings from Last 20 Encounters:   10/13/23 96%   06/29/23 96%   05/09/23 98%   04/18/23 97%   12/16/22 96%   12/02/22 99%   04/13/22 96%   02/24/22 98%   02/18/22 95%

## 2023-10-14 NOTE — PATIENT INSTRUCTIONS
Please follow up if you have any further issues.    You may contact me by phone or MyChart if you are worsening or if things are not improving.    ______________________________________________________________________     You can call [phone] during weekday hours to get a hold of our team coordinators if you need to get a message to me.    There are 3 people in our building taking phone calls for me.  Be sure to listen to the prompts to get a hold of them.    You first press 1 for English (press another number for a different language) and then press 2 to get the .    They can then take your message and forward it onto me.    ______________________________________________________________________     Please remember that you can call [phone] ANYTIME to schedule an appointment.     You can schedule appointments 24 hours a day, 7 days a week.      Sometimes the best time to schedule an appointment is after clinic hours when less people are calling in.      Weekends are another option for calling in to schedule appointments.

## 2023-10-19 ENCOUNTER — OFFICE VISIT (OUTPATIENT)
Dept: OBGYN | Facility: CLINIC | Age: 83
End: 2023-10-19
Payer: COMMERCIAL

## 2023-10-19 VITALS
OXYGEN SATURATION: 97 % | HEART RATE: 76 BPM | DIASTOLIC BLOOD PRESSURE: 70 MMHG | SYSTOLIC BLOOD PRESSURE: 126 MMHG | BODY MASS INDEX: 33.49 KG/M2 | WEIGHT: 201 LBS | HEIGHT: 65 IN

## 2023-10-19 DIAGNOSIS — Z00.00 NORMAL BREAST EXAM: Primary | ICD-10-CM

## 2023-10-19 DIAGNOSIS — Z90.710 S/P HYSTERECTOMY: ICD-10-CM

## 2023-10-19 PROCEDURE — 99202 OFFICE O/P NEW SF 15 MIN: CPT | Performed by: OBSTETRICS & GYNECOLOGY

## 2023-10-19 ASSESSMENT — ANXIETY QUESTIONNAIRES
6. BECOMING EASILY ANNOYED OR IRRITABLE: NOT AT ALL
2. NOT BEING ABLE TO STOP OR CONTROL WORRYING: NOT AT ALL
GAD7 TOTAL SCORE: 0
1. FEELING NERVOUS, ANXIOUS, OR ON EDGE: NOT AT ALL
5. BEING SO RESTLESS THAT IT IS HARD TO SIT STILL: NOT AT ALL
4. TROUBLE RELAXING: NOT AT ALL
3. WORRYING TOO MUCH ABOUT DIFFERENT THINGS: NOT AT ALL
GAD7 TOTAL SCORE: 0
IF YOU CHECKED OFF ANY PROBLEMS ON THIS QUESTIONNAIRE, HOW DIFFICULT HAVE THESE PROBLEMS MADE IT FOR YOU TO DO YOUR WORK, TAKE CARE OF THINGS AT HOME, OR GET ALONG WITH OTHER PEOPLE: NOT DIFFICULT AT ALL
7. FEELING AFRAID AS IF SOMETHING AWFUL MIGHT HAPPEN: NOT AT ALL

## 2023-10-19 NOTE — PROGRESS NOTES
"CC: Breast/pelvic exam.    HPI: The pt is a 83 year old SWF P3 who presents for a breast and pelvic exam.  She has a history of a \"bladder lift\" sometime in the 1970s or 1980s.  She had a hysterectomy that she thinks was laparoscopic in 1998; she doesn't remember why it was done, just that her doctor told her it needed to be done.  The ovaries were left in place.  She had a left shoulder injury a year or two ago and has residual pain in the left axilla that is improving over time.    Periods:  h/o hysterectomy in 1998    Mammogram:  9/19/23, normal      Past Medical History:   Diagnosis Date    Acute blood loss anemia 2/18/2015    Arthritis     Asthma     Asthma-COPD overlap syndrome 3/1/2022    Attention deficit disorder (ADD) in adult 3/28/2020    Chronic GERD 3/28/2020    Former smoker, stopped smoking in distant past     Gallstone pancreatitis 9/9/2021    HTN (hypertension)     Hypercholesteremia     Hypertension     Mild intermittent asthma with exacerbation 1/6/2020    Osteopenia 3/28/2020    Right pulmonary embolus (H) 1/6/2020    EXAM: CTA CHEST PE RUN  LOCATION: [facility]  DATE/TIME: 1/6/2020 1:43 AM   INDICATION: Pleuritic right lower chest pain. Right upper quadrant pain.  COMPARISON: None.  TECHNIQUE: CT angiogram chest during arterial phase injection IV contrast. 2D and 3D MIP reconstructions were performed by the CT technologist. Dose reduction techniques were used.  CONTRAST: Iohexol (Omni) 100 mL   FINDIN       Past Surgical History:   Procedure Laterality Date    ANKLE SURGERY Left     BLADDER SURGERY      \"bladder lift\"    CATARACT EXTRACTION Bilateral 2023    CHOLECYSTECTOMY  2020    FOOT SURGERY Left     HYSTERECTOMY  01/01/1998    LAVH    TONSILLECTOMY & ADENOIDECTOMY Bilateral     TOTAL HIP ARTHROPLASTY Left        Patient's   Family History   Problem Relation Age of Onset    Crohn's Disease Mother     Hypertension Mother     Kidney Cancer Father     Breast Cancer Maternal Grandmother " 30        bilateral mastectomy done yrs ago.    Colon Cancer Paternal Grandmother     Cancer Brother         Metastatic parotid gland tumor    Coronary Artery Disease Brother     Dementia Brother     Lung Cancer Sister     Atrial fibrillation Son     Cardiomyopathy Son     Coronary Artery Disease Son     No Known Problems Son     No Known Problems Daughter        Patient   Social History     Socioeconomic History    Marital status: Single     Spouse name: None    Number of children: None    Years of education: None    Highest education level: None   Tobacco Use    Smoking status: Former     Packs/day: 1.00     Years: 6.00     Additional pack years: 0.00     Total pack years: 6.00     Types: Cigarettes     Quit date: 1975     Years since quittin.8     Passive exposure: Current    Smokeless tobacco: Never   Vaping Use    Vaping Use: Never used   Substance and Sexual Activity    Alcohol use: Yes     Alcohol/week: 0.8 standard drinks of alcohol     Comment: Alcoholic Drinks/day: occ.    Drug use: Not Currently     Types: Marijuana   Social History Narrative    3 childen, 2 sons and 1 daughter.        Previous long term patient of Dr. Piper Greene.     Social Determinants of Health     Financial Resource Strain: Low Risk  (10/13/2023)    Financial Resource Strain     Within the past 12 months, have you or your family members you live with been unable to get utilities (heat, electricity) when it was really needed?: No   Food Insecurity: Low Risk  (10/13/2023)    Food Insecurity     Within the past 12 months, did you worry that your food would run out before you got money to buy more?: No     Within the past 12 months, did the food you bought just not last and you didn t have money to get more?: No   Transportation Needs: Low Risk  (10/13/2023)    Transportation Needs     Within the past 12 months, has lack of transportation kept you from medical appointments, getting your medicines, non-medical meetings or  appointments, work, or from getting things that you need?: No   Interpersonal Safety: Low Risk  (10/13/2023)    Interpersonal Safety     Do you feel physically and emotionally safe where you currently live?: Yes     Within the past 12 months, have you been hit, slapped, kicked or otherwise physically hurt by someone?: No     Within the past 12 months, have you been humiliated or emotionally abused in other ways by your partner or ex-partner?: No   Housing Stability: Low Risk  (10/13/2023)    Housing Stability     Do you have housing? : Yes     Are you worried about losing your housing?: No       Outpatient Medications Prior to Visit   Medication Sig Dispense Refill    acetaminophen (TYLENOL) 500 MG tablet Take 500 mg by mouth every 8 hours as needed for mild pain      amLODIPine (NORVASC) 5 MG tablet Take 1 tablet (5 mg) by mouth every evening Take with the pravastatin (Pravachol) 90 tablet 3    calcium carbonate (OS-STERLING) 600 mg calcium (1,500 mg) tablet [CALCIUM CARBONATE (OS-STERLING) 600 MG CALCIUM (1,500 MG) TABLET] Take 600 mg by mouth daily.      CHOLECALCIFEROL PO Take 2,000 Units by mouth daily      diphenhydrAMINE-acetaminophen (TYLENOL PM)  MG tablet Take 1 tablet by mouth nightly as needed for sleep      irbesartan (AVAPRO) 300 MG tablet Take 1 tablet (300 mg) by mouth daily 90 tablet 3    multivitamin therapeutic (THERAGRAN) tablet [MULTIVITAMIN THERAPEUTIC (THERAGRAN) TABLET] Take 1 tablet by mouth every morning.      pravastatin (PRAVACHOL) 40 MG tablet TAKE 1 TABLET BY MOUTH EVERY DAY IN THE MORNING 90 tablet 2    prednisoLONE acetate (PRED FORTE) 1 % ophthalmic suspension INSTILL 1 DROP INTO RIGHT EYE 4 TIMES A DAY. REDUCE BY 1 DROP EVERY 7 DAYS. START AFTER SURGERY.      PROAIR RESPICLICK 108 (90 Base) MCG/ACT inhaler INHALE 1 PUFF BY MOUTH EVERY 4 HOURS AS NEEDED 1 each 6    QVAR REDIHALER 80 MCG/ACT inhaler INHALE 1 PUFF BY MOUTH 2 TIMES DAILY. DOESN'T NEED A SPACER OR SHAKING. 21.2 g 3     No  "facility-administered medications prior to visit.       Patient is allergic to morphine, valacyclovir, azithromycin, mold, oxycodone, perfume, and tobacco [tobacco].    ROS:  12 part ROS is negative aside from those symptoms in the HPI    PE:  /70 (BP Location: Right arm, Patient Position: Sitting, Cuff Size: Adult Regular)   Pulse 76   Ht 1.651 m (5' 5\")   Wt 91.2 kg (201 lb)   LMP  (LMP Unknown)           Body mass index is 33.45 kg/m .    General: obese WF, NAD  Breast: breasts appear normal, no suspicious masses, no skin or nipple changes or axillary nodes  Abdomen: soft, NT/ND  Pelvic: EG/BUS no lesions, no skin change   Vagina no lesions, no discharge   Cervix absent   Uterus absent   Adnexa NT, no masses bilaterally, ovaries themselves not palpable   Perineum no lesions   Rectal deferred  Extremities: no C/C/E, no lymphadenopathy, normal gait  Neurologic: CN I-XII grossly intact    Assessment: 83 year old SWF P3 with normal exam s/p LAVH    Plan: Pap smear not needed anymore.  Mammogram annually if still at a point where she would treat a cancer.      20 minutes spent with the patient, with the majority in counseling, chart review, visit, and documentation.  Answers submitted by the patient for this visit:  MARCOS-7 (Submitted on 10/19/2023)  MARCOS 7 TOTAL SCORE: 0    "

## 2023-10-29 DIAGNOSIS — I10 PRIMARY HYPERTENSION: Chronic | ICD-10-CM

## 2023-10-30 RX ORDER — AMLODIPINE BESYLATE 5 MG/1
5 TABLET ORAL EVERY EVENING
Qty: 90 TABLET | Refills: 3 | OUTPATIENT
Start: 2023-10-30 | End: 2024-10-29

## 2023-12-10 DIAGNOSIS — I10 PRIMARY HYPERTENSION: Chronic | ICD-10-CM

## 2023-12-10 RX ORDER — AMLODIPINE BESYLATE 5 MG/1
5 TABLET ORAL EVERY EVENING
Qty: 90 TABLET | Refills: 3 | Status: SHIPPED | OUTPATIENT
Start: 2023-12-10 | End: 2024-12-09

## 2023-12-10 NOTE — TELEPHONE ENCOUNTER
Medication Question or Refill    Contacts         Type Contact Phone/Fax    12/10/2023 09:23 AM CST Interface (Incoming) [id number] - [place] [address] [phone]            What medication are you calling about (include dose and sig)?: amlodipine 5 mg &  atenolol 25 mg    Preferred Pharmacy:   [facility]/pharmacy #[id number] - [address] [address]  Phone: [phone] Fax: [phone]      Controlled Substance Agreement on file:   CSA -- Patient Level:    CSA: None found at the patient level.       Who prescribed the medication?: pcp    Do you need a refill? Yes    When did you use the medication last? 12/10     Patient offered an appointment? No    Do you have any questions or concerns?  No      Could we send this information to you in [facility] or would you prefer to receive a phone call?:   Patient would prefer a phone call   Okay to leave a detailed message?: Yes at Cell number on file:    Telephone Information:   Mobile [phone]

## 2023-12-21 ENCOUNTER — OFFICE VISIT (OUTPATIENT)
Dept: INTERNAL MEDICINE | Facility: CLINIC | Age: 83
End: 2023-12-21
Payer: COMMERCIAL

## 2023-12-21 VITALS
DIASTOLIC BLOOD PRESSURE: 60 MMHG | OXYGEN SATURATION: 95 % | HEART RATE: 63 BPM | SYSTOLIC BLOOD PRESSURE: 124 MMHG | BODY MASS INDEX: 39.07 KG/M2 | HEIGHT: 60 IN | RESPIRATION RATE: 16 BRPM | TEMPERATURE: 97.4 F | WEIGHT: 199 LBS

## 2023-12-21 DIAGNOSIS — R73.9 ELEVATED BLOOD SUGAR: ICD-10-CM

## 2023-12-21 DIAGNOSIS — E78.00 HYPERCHOLESTEREMIA: ICD-10-CM

## 2023-12-21 DIAGNOSIS — J44.89 ASTHMA-COPD OVERLAP SYNDROME (H): ICD-10-CM

## 2023-12-21 DIAGNOSIS — R20.2 PARESTHESIA OF LEFT FOOT: ICD-10-CM

## 2023-12-21 DIAGNOSIS — I10 PRIMARY HYPERTENSION: ICD-10-CM

## 2023-12-21 DIAGNOSIS — Z00.00 ENCOUNTER FOR MEDICARE ANNUAL WELLNESS EXAM: Primary | ICD-10-CM

## 2023-12-21 DIAGNOSIS — I10 BENIGN ESSENTIAL HYPERTENSION: ICD-10-CM

## 2023-12-21 DIAGNOSIS — Z86.39 HISTORY OF IRON DEFICIENCY: ICD-10-CM

## 2023-12-21 DIAGNOSIS — Z13.21 ENCOUNTER FOR VITAMIN DEFICIENCY SCREENING: ICD-10-CM

## 2023-12-21 DIAGNOSIS — M65.30 TRIGGER FINGER, ACQUIRED: ICD-10-CM

## 2023-12-21 LAB
ERYTHROCYTE [DISTWIDTH] IN BLOOD BY AUTOMATED COUNT: 12.4 % (ref 10–15)
HBA1C MFR BLD: 5.6 % (ref 0–5.6)
HCT VFR BLD AUTO: 43 % (ref 35–47)
HGB BLD-MCNC: 14.3 G/DL (ref 11.7–15.7)
MCH RBC QN AUTO: 30.7 PG (ref 26.5–33)
MCHC RBC AUTO-ENTMCNC: 33.3 G/DL (ref 31.5–36.5)
MCV RBC AUTO: 92 FL (ref 78–100)
PLATELET # BLD AUTO: 212 10E3/UL (ref 150–450)
RBC # BLD AUTO: 4.66 10E6/UL (ref 3.8–5.2)
VIT D+METAB SERPL-MCNC: 59 NG/ML (ref 20–50)
WBC # BLD AUTO: 7.2 10E3/UL (ref 4–11)

## 2023-12-21 PROCEDURE — 82306 VITAMIN D 25 HYDROXY: CPT | Mod: GZ | Performed by: INTERNAL MEDICINE

## 2023-12-21 PROCEDURE — 83036 HEMOGLOBIN GLYCOSYLATED A1C: CPT | Performed by: INTERNAL MEDICINE

## 2023-12-21 PROCEDURE — 83550 IRON BINDING TEST: CPT | Performed by: INTERNAL MEDICINE

## 2023-12-21 PROCEDURE — 84443 ASSAY THYROID STIM HORMONE: CPT | Performed by: INTERNAL MEDICINE

## 2023-12-21 PROCEDURE — 36415 COLL VENOUS BLD VENIPUNCTURE: CPT | Performed by: INTERNAL MEDICINE

## 2023-12-21 PROCEDURE — G0439 PPPS, SUBSEQ VISIT: HCPCS | Performed by: INTERNAL MEDICINE

## 2023-12-21 PROCEDURE — 99214 OFFICE O/P EST MOD 30 MIN: CPT | Mod: 25 | Performed by: INTERNAL MEDICINE

## 2023-12-21 PROCEDURE — 85027 COMPLETE CBC AUTOMATED: CPT | Performed by: INTERNAL MEDICINE

## 2023-12-21 PROCEDURE — 80053 COMPREHEN METABOLIC PANEL: CPT | Performed by: INTERNAL MEDICINE

## 2023-12-21 PROCEDURE — 82088 ASSAY OF ALDOSTERONE: CPT | Performed by: INTERNAL MEDICINE

## 2023-12-21 PROCEDURE — 83540 ASSAY OF IRON: CPT | Performed by: INTERNAL MEDICINE

## 2023-12-21 PROCEDURE — 83735 ASSAY OF MAGNESIUM: CPT | Performed by: INTERNAL MEDICINE

## 2023-12-21 PROCEDURE — 82607 VITAMIN B-12: CPT | Performed by: INTERNAL MEDICINE

## 2023-12-21 PROCEDURE — 80061 LIPID PANEL: CPT | Performed by: INTERNAL MEDICINE

## 2023-12-21 RX ORDER — RESPIRATORY SYNCYTIAL VIRUS VACCINE 120MCG/0.5
0.5 KIT INTRAMUSCULAR ONCE
Qty: 1 EACH | Refills: 0 | Status: CANCELLED | OUTPATIENT
Start: 2023-12-21 | End: 2023-12-21

## 2023-12-21 RX ORDER — ATENOLOL 25 MG/1
25 TABLET ORAL EVERY MORNING
Start: 2023-12-21 | End: 2024-01-02

## 2023-12-21 ASSESSMENT — ENCOUNTER SYMPTOMS
EYE PAIN: 1
ARTHRALGIAS: 1
WEAKNESS: 1
BREAST MASS: 0
MYALGIAS: 1

## 2023-12-21 ASSESSMENT — ASTHMA QUESTIONNAIRES
QUESTION_5 LAST FOUR WEEKS HOW WOULD YOU RATE YOUR ASTHMA CONTROL: SOMEWHAT CONTROLLED
QUESTION_1 LAST FOUR WEEKS HOW MUCH OF THE TIME DID YOUR ASTHMA KEEP YOU FROM GETTING AS MUCH DONE AT WORK, SCHOOL OR AT HOME: SOME OF THE TIME
QUESTION_2 LAST FOUR WEEKS HOW OFTEN HAVE YOU HAD SHORTNESS OF BREATH: MORE THAN ONCE A DAY
QUESTION_3 LAST FOUR WEEKS HOW OFTEN DID YOUR ASTHMA SYMPTOMS (WHEEZING, COUGHING, SHORTNESS OF BREATH, CHEST TIGHTNESS OR PAIN) WAKE YOU UP AT NIGHT OR EARLIER THAN USUAL IN THE MORNING: NOT AT ALL
ACT_TOTALSCORE: 17
QUESTION_4 LAST FOUR WEEKS HOW OFTEN HAVE YOU USED YOUR RESCUE INHALER OR NEBULIZER MEDICATION (SUCH AS ALBUTEROL): NOT AT ALL
ACT_TOTALSCORE: 17

## 2023-12-21 ASSESSMENT — ACTIVITIES OF DAILY LIVING (ADL): CURRENT_FUNCTION: NO ASSISTANCE NEEDED

## 2023-12-21 NOTE — LETTER
December 29, 2023      Aranza HI Yonathan  [address]   [facility] [address]        Dear ,    We are writing to inform you of your test results.    Vitamin D level is high and you do not need to take a higher dose at this time.     Agustin Okeefe MD  General Internal Medicine  [facility]  12/21/2023, 11:52 PM    Resulted Orders   Hemoglobin A1c   Result Value Ref Range    Hemoglobin A1C 5.6 0.0 - 5.6 %      Comment:      Normal <5.7%   Prediabetes 5.7-6.4%    Diabetes 6.5% or higher     Note: Adopted from ADA consensus guidelines.   Vitamin D Deficiency   Result Value Ref Range    Vitamin D, Total (25-Hydroxy) 59 (H) 20 - 50 ng/mL      Comment:      indicates supplementation, with increased risk of hypercalciuria    Narrative    Season, race, dietary intake, and treatment affect the concentration of 25-hydroxy-Vitamin D. Values may decrease during winter months and increase during summer months.    Vitamin D determination is routinely performed by an immunoassay specific for 25 hydroxyvitamin D3.  If an individual is on vitamin D2(ergocalciferol) supplementation, please specify 25 OH vitamin D2 and D3 level determination by LCMSMS test VITD23.     CBC with platelets   Result Value Ref Range    WBC Count 7.2 4.0 - 11.0 10e3/uL    RBC Count 4.66 3.80 - 5.20 10e6/uL    Hemoglobin 14.3 11.7 - 15.7 g/dL    Hematocrit 43.0 35.0 - 47.0 %    MCV 92 78 - 100 fL    MCH 30.7 26.5 - 33.0 pg    MCHC 33.3 31.5 - 36.5 g/dL    RDW 12.4 10.0 - 15.0 %    Platelet Count 212 150 - 450 10e3/uL       If you have any questions or concerns, please call the clinic at the number listed above.       Sincerely,      Agustin Okeefe MD

## 2023-12-21 NOTE — PROGRESS NOTES
[facility]    Comprehensive and complex medical care - Chronic disease management - Shared decision making - Care coordination - Compassionate care    Patient advocacy - Rational deprescribing - Minimally disruptive medicine - Ethical focus - Customized care         Date of Service: 12/21/2023  Primary Provider: Agustin Okeefe    Patient Care Team:  Agustin Okeefe MD as PCP - General (Internal Medicine)  Agustin Okeefe MD as Assigned PCP  Debi Santiago MD as MD (Ophthalmology)  Ashley Mon MD as Assigned OBGYN Provider          Patient's Pharmacy:    [facility]/pharmacy #[id number] - [place], [place] - [address]  [address] [address]  Phone: [phone] Fax: [phone]     Patient's Contacts:  Name Home Phone Work Phone Mobile Phone Relationship Lgl WHITNEY Vu [phone]   Other      Patient's Insurance:    Payor: Simple / Plan: [employer] MEDICARE / Product Type: HMO /      Subjective:     History of present illness:    Aranza Mcmanus is an 83 year old here for an annual wellness visit.    The issues she would like to address at today's visit include the following:    Chief Complaint   Patient presents with    Wellness Visit           12/21/2023    11:47 AM   Additional Questions   Roomed by Fei Jha   Accompanied by N/A     Patient comes in today for follow-up of a number of issues and annual wellness visit.    We reviewed her high blood pressure.  Her blood pressure here today and last time were good.  She tried off of atenolol.  She monitored her blood pressure with a wrist cuff.  Upon further questioning, she has had her wrist cuff down at her waist level when she checks this.  We reviewed appropriate blood pressure checking related to this.  We did check it against our blood pressure cuff the last time around and it seemed to be accurate.  She is getting elevated blood pressure readings as an outpatient.  She denies any other  "symptoms.  She did go back on the atenolol.    We reviewed her trigger fingers.  She has noticed triggering especially in her left index finger.  She does play guitar and this does affect it somewhat.    She does have numbness in her left lower leg which has been chronic.  She has not noticed any worsening of this.    We reviewed her lungs and things are stable here.    Will follow-up on her cholesterol as well.    We reviewed her other issues noted in the assessment but not specifically addressed in the HPI above.           Active Problem List:  Problem List as of 12/21/2023 Reviewed: 10/13/2023  8:52 PM by Agustin Okeefe MD         High    Former smoker, stopped smoking in distant past    Right pulmonary embolus (H)       Medium    Primary hypertension    Osteoarthritis    Asthma-COPD overlap syndrome       Low    Hypercholesteremia    Chronic GERD    Paresthesia of left foot        Past Medical History:   Diagnosis Date    Acute blood loss anemia 2/18/2015    Arthritis     Asthma     Asthma-COPD overlap syndrome 3/1/2022    Attention deficit disorder (ADD) in adult 3/28/2020    Chronic GERD 3/28/2020    Former smoker, stopped smoking in distant past     Gallstone pancreatitis 9/9/2021    HTN (hypertension)     Hypercholesteremia     Hypertension     Mild intermittent asthma with exacerbation 1/6/2020    Osteopenia 3/28/2020    Right pulmonary embolus (H) 1/6/2020    EXAM: CTA CHEST PE RUN  LOCATION: [facility]  DATE/TIME: 1/6/2020 1:43 AM   INDICATION: Pleuritic right lower chest pain. Right upper quadrant pain.  COMPARISON: None.  TECHNIQUE: CT angiogram chest during arterial phase injection IV contrast. 2D and 3D MIP reconstructions were performed by the CT technologist. Dose reduction techniques were used.  CONTRAST: Iohexol (Omni) 100 mL   FINDIN      Past Surgical History:   Procedure Laterality Date    ANKLE SURGERY Left     BLADDER SURGERY      \"bladder lift\"    CATARACT EXTRACTION Bilateral 2023 "    CHOLECYSTECTOMY  2020    FOOT SURGERY Left     HYSTERECTOMY  1998    LAV    TONSILLECTOMY & ADENOIDECTOMY Bilateral     TOTAL HIP ARTHROPLASTY Left       Family History   Problem Relation Age of Onset    Crohn's Disease Mother     Hypertension Mother     Kidney Cancer Father     Breast Cancer Maternal Grandmother 30        bilateral mastectomy done yrs ago.    Colon Cancer Paternal Grandmother     Cancer Brother         Metastatic parotid gland tumor    Coronary Artery Disease Brother     Dementia Brother     Lung Cancer Sister     Atrial fibrillation Son     Cardiomyopathy Son     Coronary Artery Disease Son     No Known Problems Son     No Known Problems Daughter       Family history is otherwise noncontributory.    Social History     Occupational History    Not on file   Tobacco Use    Smoking status: Former     Packs/day: 1.00     Years: 6.00     Additional pack years: 0.00     Total pack years: 6.00     Types: Cigarettes     Quit date: 1975     Years since quittin.0     Passive exposure: Current    Smokeless tobacco: Never   Vaping Use    Vaping Use: Never used   Substance and Sexual Activity    Alcohol use: Yes     Alcohol/week: 0.8 standard drinks of alcohol     Comment: Alcoholic Drinks/day: occ.    Drug use: Not Currently     Types: Marijuana    Sexual activity: Not on file      Social History     Social History Narrative    3 childen, 2 sons and 1 daughter.        Previous long term patient of Dr. Piper Greene.        Hobbies include playing [employer] and other stringed instruments.           Current Outpatient Medications   Medication Instructions    acetaminophen (TYLENOL) 500 mg, Oral, EVERY 8 HOURS PRN    amLODIPine (NORVASC) 5 mg, Oral, EVERY EVENING, Take with the pravastatin (Pravachol)    atenolol (TENORMIN) 25 mg, Oral, EVERY MORNING    calcium 600 mg, DAILY    CHOLECALCIFEROL PO 2,000 Units, Oral, DAILY    diphenhydrAMINE-acetaminophen (TYLENOL PM)  MG tablet 1 tablet,  Oral, AT BEDTIME PRN    irbesartan (AVAPRO) 300 mg, Oral, DAILY    multivitamin therapeutic (THERAGRAN) tablet 1 tablet, EVERY MORNING    pravastatin (PRAVACHOL) 40 mg, Oral, EVERY MORNING    prednisoLONE acetate (PRED FORTE) 1 % ophthalmic suspension INSTILL 1 DROP INTO RIGHT EYE 4 TIMES A DAY. REDUCE BY 1 DROP EVERY 7 DAYS. START AFTER SURGERY.    PROAIR RESPICLICK 108 (90 Base) MCG/ACT inhaler INHALE 1 PUFF BY MOUTH EVERY 4 HOURS AS NEEDED    QVAR REDIHALER 80 MCG/ACT inhaler INHALE 1 PUFF BY MOUTH 2 TIMES DAILY. DOESN'T NEED A SPACER OR SHAKING.      Allergies: Morphine, Valacyclovir, Azithromycin, Mold, Oxycodone, Perfume, and Tobacco [tobacco]     Immunization History   Administered Date(s) Administered    COVID-19 12+ (2023-24) (Pfizer) 10/13/2023    COVID-19 Bivalent 12+ (Pfizer) 10/25/2022    COVID-19 MONOVALENT 12+ (Pfizer) 03/09/2021, 04/01/2021, 10/14/2021    COVID-19 Monovalent 12+ (Pfizer 2022) 06/23/2022    Flu, Unspecified 12/01/2014    Influenza (H1N1) 12/16/2009    Influenza (High Dose) 3 valent vaccine 03/09/2020    Influenza Vaccine 18-64 (Flublok) 10/25/2022    Influenza Vaccine 65+ (FLUAD) 10/25/2022    Influenza Vaccine 65+ (Fluzone HD) 09/08/2021, 09/07/2023    Influenza, seasonal, injectable, PF 11/01/2007    Pneumo Conj 13-V (2010&after) 12/15/2015    Pneumococcal (PCV 7) 01/01/2007    Pneumococcal 23 valent 12/22/2006, 03/09/2020    TDAP Vaccine (Boostrix) 12/07/2009    Td (Adult), Adsorbed 01/01/2002    Zoster recombinant adjuvanted (SHINGRIX) 01/09/2021, 09/07/2023    Zoster vaccine, live 11/12/2008      Objective:     Wt Readings from Last 3 Encounters:   12/21/23 90.3 kg (199 lb)   10/19/23 91.2 kg (201 lb)   10/13/23 91.6 kg (202 lb)     BP Readings from Last 3 Encounters:   12/21/23 124/60   10/19/23 126/70   10/13/23 128/72       PHYSICAL EXAM  /60 (BP Location: Right arm, Patient Position: Sitting, Cuff Size: Adult Regular)   Pulse 63   Temp 97.4  F (36.3  C) (Oral)   Resp  16   Ht 1.524 m (5')   Wt 90.3 kg (199 lb)   LMP  (LMP Unknown)   SpO2 95%   BMI 38.86 kg/m     The patient is comfortable, no acute distress.  Mood good.  Insight is good.  No skin lesions or nodules of concern.  Ears clear.  Eyes are nonicteric.  Pupils equal and reactive.  Throat is clear.  Neck is supple without mass, no thyromegaly. No cervical or epitrochlear adenopathy.  No axillary lymph nodes. Heart regular rate and rhythm.  Lungs clear to auscultation bilaterally.  Respiratory effort good.  Abdomen soft and nontender.  No hepatosplenomegaly.  Extremities show no edema. \     Diagnostics:     Admission on 12/02/2022, Discharged on 12/02/2022   Component Date Value Ref Range Status    D-Dimer Quantitative 12/02/2022 0.49  0.00 - 0.50 ug/mL FEU Final    Sodium 12/02/2022 139  136 - 145 mmol/L Final    Potassium 12/02/2022 4.3  3.4 - 5.3 mmol/L Final    Chloride 12/02/2022 105  98 - 107 mmol/L Final    Carbon Dioxide (CO2) 12/02/2022 26  22 - 29 mmol/L Final    Anion Gap 12/02/2022 8  7 - 15 mmol/L Final    Urea Nitrogen 12/02/2022 21.9  8.0 - 23.0 mg/dL Final    Creatinine 12/02/2022 1.03 (H)  0.51 - 0.95 mg/dL Final    Calcium 12/02/2022 9.6  8.8 - 10.2 mg/dL Final    Glucose 12/02/2022 97  70 - 99 mg/dL Final    Alkaline Phosphatase 12/02/2022 74  35 - 104 U/L Final    AST 12/02/2022 22  10 - 35 U/L Final    ALT 12/02/2022 25  10 - 35 U/L Final    Protein Total 12/02/2022 6.8  6.4 - 8.3 g/dL Final    Albumin 12/02/2022 4.6  3.5 - 5.2 g/dL Final    Bilirubin Total 12/02/2022 0.4  <=1.2 mg/dL Final    GFR Estimate 12/02/2022 55 (L)  >60 mL/min/1.73m2 Final    Effective December 21, 2021 eGFRcr in adults is calculated using the 2021 CKD-EPI creatinine equation which includes age and gender (Dana et al., NEJM, DOI: 10.1056/EYFUmg9870988)    Lipase 12/02/2022 51  13 - 60 U/L Final    Color Urine 12/02/2022 Yellow  Colorless, Straw, Light Yellow, Yellow Final    Appearance Urine 12/02/2022 Clear  Clear  Final    Glucose Urine 12/02/2022 Negative  Negative mg/dL Final    Bilirubin Urine 12/02/2022 Negative  Negative Final    Ketones Urine 12/02/2022 Negative  Negative mg/dL Final    Specific Gravity Urine 12/02/2022 1.025  1.001 - 1.030 Final    Blood Urine 12/02/2022 Negative  Negative Final    pH Urine 12/02/2022 5.0  5.0 - 7.0 Final    Protein Albumin Urine 12/02/2022 Negative  Negative mg/dL Final    Urobilinogen Urine 12/02/2022 <2.0  <2.0 mg/dL Final    Nitrite Urine 12/02/2022 Negative  Negative Final    Leukocyte Esterase Urine 12/02/2022 250 Keren/uL (A)  Negative Final    Mucus Urine 12/02/2022 Present (A)  None Seen /LPF Final    RBC Urine 12/02/2022 0  <=2 /HPF Final    WBC Urine 12/02/2022 24 (H)  <=5 /HPF Final    Squamous Epithelials Urine 12/02/2022 3 (H)  <=1 /HPF Final    WBC Count 12/02/2022 7.2  4.0 - 11.0 10e3/uL Final    RBC Count 12/02/2022 5.10  3.80 - 5.20 10e6/uL Final    Hemoglobin 12/02/2022 15.3  11.7 - 15.7 g/dL Final    Hematocrit 12/02/2022 48.1 (H)  35.0 - 47.0 % Final    MCV 12/02/2022 94  78 - 100 fL Final    MCH 12/02/2022 30.0  26.5 - 33.0 pg Final    MCHC 12/02/2022 31.8  31.5 - 36.5 g/dL Final    RDW 12/02/2022 12.6  10.0 - 15.0 % Final    Platelet Count 12/02/2022 219  150 - 450 10e3/uL Final    % Neutrophils 12/02/2022 69  % Final    % Lymphocytes 12/02/2022 20  % Final    % Monocytes 12/02/2022 8  % Final    % Eosinophils 12/02/2022 2  % Final    % Basophils 12/02/2022 1  % Final    % Immature Granulocytes 12/02/2022 0  % Final    NRBCs per 100 WBC 12/02/2022 0  <1 /100 Final    Absolute Neutrophils 12/02/2022 5.0  1.6 - 8.3 10e3/uL Final    Absolute Lymphocytes 12/02/2022 1.4  0.8 - 5.3 10e3/uL Final    Absolute Monocytes 12/02/2022 0.6  0.0 - 1.3 10e3/uL Final    Absolute Eosinophils 12/02/2022 0.2  0.0 - 0.7 10e3/uL Final    Absolute Basophils 12/02/2022 0.1  0.0 - 0.2 10e3/uL Final    Absolute Immature Granulocytes 12/02/2022 0.0  <=0.4 10e3/uL Final    Absolute NRBCs  12/02/2022 0.0  10e3/uL Final    Hold Specimen 12/02/2022 [facility]   Final    Culture 12/02/2022 50,000-100,000 CFU/mL Mixture of urogenital annelise   Final       Results for orders placed or performed in visit on 12/21/23   Hemoglobin A1c     Status: Normal   Result Value Ref Range    Hemoglobin A1C 5.6 0.0 - 5.6 %   CBC with platelets     Status: Normal   Result Value Ref Range    WBC Count 7.2 4.0 - 11.0 10e3/uL    RBC Count 4.66 3.80 - 5.20 10e6/uL    Hemoglobin 14.3 11.7 - 15.7 g/dL    Hematocrit 43.0 35.0 - 47.0 %    MCV 92 78 - 100 fL    MCH 30.7 26.5 - 33.0 pg    MCHC 33.3 31.5 - 36.5 g/dL    RDW 12.4 10.0 - 15.0 %    Platelet Count 212 150 - 450 10e3/uL   Aldosterone Renin Ratio     Status: None (In process)    Narrative    The following orders were created for panel order Aldosterone Renin Ratio.  Procedure                               Abnormality         Status                     ---------                               -----------         ------                     Aldosterone[[id number]]                                      In process                 Renin activity[[id number]]                                   In process                 Aldosterone Renin Ratio[[id number]]                          In process                   Please view results for these tests on the individual orders.       Assessment:     1. Encounter for Medicare annual wellness exam    2. Primary hypertension    3. Hypercholesteremia    4. Elevated blood sugar    5. Encounter for vitamin deficiency screening    6. Body mass index (BMI) 38.0-38.9, adult    7. History of iron deficiency    8. Paresthesia of left foot    9. Asthma-COPD overlap syndrome    10. Trigger finger, acquired    11. Benign essential hypertension         Plan:     Blood tests done below as noted.  Follow blood pressure as an outpatient with new recommendations.  Could consider adding in HCTZ if needed but need to be careful for hypotension.  Up-to-date on other  healthcare maintenance.  Continue current medications  Follow up sooner if issues.    Orders Placed This Encounter   Procedures    Comprehensive metabolic panel    Lipid panel reflex to direct LDL Non-fasting    Hemoglobin A1c    Vitamin D Deficiency    Aldosterone    Renin activity    Metanephrines Plasma Free    CBC with platelets    Iron and iron binding capacity    Magnesium    TSH    Vitamin B12    Aldosterone    Renin activity    Aldosterone Renin Ratio    Aldosterone Renin Ratio        A personalized health plan based on the identified health risks was provided to the patient on the AVS.       Agustin Okeefe MD  General Internal Medicine  [facility]      Return in about 1 year (around 12/21/2024) for annual wellness visit.     No future appointments.      Health Maintenance   Topic Date Due    ANNUAL REVIEW OF  ORDERS  12/13/2022    RSV VACCINE (Pregnancy & 60+) (1 - 1-dose 60+ series) 12/21/2024 (Originally 5/5/2002)    ASTHMA ACTION PLAN  02/28/2062 (Originally 1940)    ASTHMA CONTROL TEST  06/21/2024    MEDICARE ANNUAL WELLNESS VISIT  12/21/2024    FALL RISK ASSESSMENT  12/21/2024    ADVANCE CARE PLANNING  12/21/2028    DEXA  12/13/2033    PHQ-2 (once per calendar year)  Completed    INFLUENZA VACCINE  Completed    Pneumococcal Vaccine: 65+ Years  Completed    ZOSTER IMMUNIZATION  Completed    COVID-19 Vaccine  Completed    IPV IMMUNIZATION  Aged Out    HPV IMMUNIZATION  Aged Out    MENINGITIS IMMUNIZATION  Aged Out    RSV MONOCLONAL ANTIBODY  Aged Out    DTAP/TDAP/TD IMMUNIZATION  Discontinued          I have reviewed Opioid Use Disorder and Substance Use Disorder risk factors and made any needed referrals.  This may not apply to this individual patient, but this documentation is required by Medicare.    The patient was provided with written information regarding signs of hearing loss.

## 2023-12-21 NOTE — PATIENT INSTRUCTIONS
Patient Education   Personalized Prevention Plan  You are due for the preventive services outlined below.  Your care team is available to assist you in scheduling these services.  If you have already completed any of these items, please share that information with your care team to update in your medical record.  Health Maintenance Due   Topic Date Due    ANNUAL REVIEW OF  ORDERS  12/13/2022     Hearing Loss: Care Instructions  Overview     Hearing loss is a sudden or slow decrease in how well you hear. It can range from slight to profound. Permanent hearing loss can occur with aging. It also can happen when you are exposed long-term to loud noise. Examples include listening to loud music, riding motorcycles, or being around other loud machines.  Hearing loss can affect your work and home life. It can make you feel lonely or depressed. You may feel that you have lost your independence. But hearing aids and other devices can help you hear better and feel connected to others.  Follow-up care is a key part of your treatment and safety. Be sure to make and go to all appointments, and call your doctor if you are having problems. It's also a good idea to know your test results and keep a list of the medicines you take.  How can you care for yourself at home?  Avoid loud noises whenever possible. This helps keep your hearing from getting worse.  Always wear hearing protection around loud noises.  Wear a hearing aid as directed.  A professional can help you pick a hearing aid that will work best for you.  You can also get hearing aids over the counter for mild to moderate hearing loss.  Have hearing tests as your doctor suggests. They can show whether your hearing has changed. Your hearing aid may need to be adjusted.  Use other devices as needed. These may include:  Telephone amplifiers and hearing aids that can connect to a television, stereo, radio, or microphone.  Devices that use lights or vibrations. These alert you  "to the doorbell, a ringing telephone, or a baby monitor.  Television closed-captioning. This shows the words at the bottom of the screen. Most new TVs can do this.  TTY (text telephone). This lets you type messages back and forth on the telephone instead of talking or listening. These devices are also called TDD. When messages are typed on the keyboard, they are sent over the phone line to a receiving TTY. The message is shown on a monitor.  Use text messaging, social media, and email if it is hard for you to communicate by telephone.  Try to learn a listening technique called speechreading. It is not lipreading. You pay attention to people's gestures, expressions, posture, and tone of voice. These clues can help you understand what a person is saying. Face the person you are talking to, and have them face you. Make sure the lighting is good. You need to see the other person's face clearly.  Think about counseling if you need help to adjust to your hearing loss.  When should you call for help?  Watch closely for changes in your health, and be sure to contact your doctor if:    You think your hearing is getting worse.     You have new symptoms, such as dizziness or nausea.   Where can you learn more?  Go to https://www.[employer].net/patiented  Enter R798 in the search box to learn more about \"Hearing Loss: Care Instructions.\"  Current as of: February 28, 2023               Content Version: 13.8    8959-4734 [employer].   Care instructions adapted under license by your healthcare professional. If you have questions about a medical condition or this instruction, always ask your healthcare professional. [employer] disclaims any warranty or liability for your use of this information.         "

## 2023-12-21 NOTE — PROGRESS NOTES
"  Are you in the first 12 months of your Medicare coverage?  No    Healthy Habits:     In general, how would you rate your overall health?  Good    Frequency of exercise:  2-3 days/week    Duration of exercise:  30-45 minutes    Do you usually eat at least 4 servings of fruit and vegetables a day, include whole grains    & fiber and avoid regularly eating high fat or \"junk\" foods?  Yes    Taking medications regularly:  Yes    Medication side effects:  None    Ability to successfully perform activities of daily living:  No assistance needed    Home Safety:  No safety concerns identified    Hearing Impairment:  Difficulty following a conversation in a noisy restaurant or crowded room, feel that people are mumbling or not speaking clearly, difficulty following dialogue in the theater, difficult to understand a speaker at a public meeting or Hinduism service, need to ask people to speak up or repeat themselves and difficulty understanding soft or whispered speech    In the past 6 months, have you been bothered by leaking of urine?  No    In general, how would you rate your overall mental or emotional health?  Good    Additional concerns today:  Yes      Today's PHQ-2 Score:       12/21/2023    11:34 AM   PHQ-2 ( 1999 Pfizer)   Q1: Little interest or pleasure in doing things 0   Q2: Feeling down, depressed or hopeless 0   PHQ-2 Score 0   Q1: Little interest or pleasure in doing things Not at all   Q2: Feeling down, depressed or hopeless Not at all   PHQ-2 Score 0           Have you ever done Advance Care Planning? (For example, a Health Directive, POLST, or a discussion with a medical provider or your loved ones about your wishes): Yes, advance care planning is on file.       Fall risk  Fallen 2 or more times in the past year?: No  Any fall with injury in the past year?: No    Cognitive Screening   1) Repeat 3 items (Leader, Season, Table)    2) Clock draw: NORMAL  3) 3 item recall: Recalls 3 objects  Results: 3 items " recalled: COGNITIVE IMPAIRMENT LESS LIKELY    Mini-CogTM Copyright [employer]. Licensed by the author for use in [facility]; reprinted with permission (bonny@[facility]). All rights reserved.      Do you have sleep apnea, excessive snoring or daytime drowsiness? : no

## 2023-12-22 LAB
ALBUMIN SERPL BCG-MCNC: 4.1 G/DL (ref 3.5–5.2)
ALDOST SERPL-MCNC: 10.3 NG/DL (ref 0–31)
ALP SERPL-CCNC: 79 U/L (ref 40–150)
ALT SERPL W P-5'-P-CCNC: 17 U/L (ref 0–50)
ANION GAP SERPL CALCULATED.3IONS-SCNC: 11 MMOL/L (ref 7–15)
AST SERPL W P-5'-P-CCNC: 20 U/L (ref 0–45)
BILIRUB SERPL-MCNC: 0.4 MG/DL
BUN SERPL-MCNC: 20.6 MG/DL (ref 8–23)
CALCIUM SERPL-MCNC: 9.7 MG/DL (ref 8.8–10.2)
CHLORIDE SERPL-SCNC: 103 MMOL/L (ref 98–107)
CHOLEST SERPL-MCNC: 152 MG/DL
CREAT SERPL-MCNC: 1.09 MG/DL (ref 0.51–0.95)
DEPRECATED HCO3 PLAS-SCNC: 25 MMOL/L (ref 22–29)
EGFRCR SERPLBLD CKD-EPI 2021: 51 ML/MIN/1.73M2
FASTING STATUS PATIENT QL REPORTED: ABNORMAL
GLUCOSE SERPL-MCNC: 95 MG/DL (ref 70–99)
HDLC SERPL-MCNC: 44 MG/DL
IRON BINDING CAPACITY (ROCHE): 272 UG/DL (ref 240–430)
IRON SATN MFR SERPL: 22 % (ref 15–46)
IRON SERPL-MCNC: 61 UG/DL (ref 37–145)
LDLC SERPL CALC-MCNC: 71 MG/DL
MAGNESIUM SERPL-MCNC: 2.2 MG/DL (ref 1.7–2.3)
NONHDLC SERPL-MCNC: 108 MG/DL
POTASSIUM SERPL-SCNC: 4.5 MMOL/L (ref 3.4–5.3)
PROT SERPL-MCNC: 6.5 G/DL (ref 6.4–8.3)
SODIUM SERPL-SCNC: 139 MMOL/L (ref 135–145)
TRIGL SERPL-MCNC: 185 MG/DL
TSH SERPL DL<=0.005 MIU/L-ACNC: 1.18 UIU/ML (ref 0.3–4.2)
VIT B12 SERPL-MCNC: 694 PG/ML (ref 232–1245)

## 2023-12-27 ENCOUNTER — TELEPHONE (OUTPATIENT)
Dept: INTERNAL MEDICINE | Facility: CLINIC | Age: 83
End: 2023-12-27
Payer: COMMERCIAL

## 2023-12-27 NOTE — TELEPHONE ENCOUNTER
Pt dropped off a update list of her Blood Pressure Readings, she wanted Dr. Okeefe to have a copy. Placed in PCPs mailbox.

## 2023-12-28 ENCOUNTER — TELEPHONE (OUTPATIENT)
Dept: INTERNAL MEDICINE | Facility: CLINIC | Age: 83
End: 2023-12-28
Payer: COMMERCIAL

## 2023-12-28 DIAGNOSIS — J02.9 ST (SORE THROAT): ICD-10-CM

## 2023-12-28 DIAGNOSIS — R05.9 COUGH, UNSPECIFIED TYPE: Primary | ICD-10-CM

## 2023-12-28 NOTE — TELEPHONE ENCOUNTER
"Contacted patient and reviewed message from PCP.  Patient has not yet tested, \"I didn't even think about covid.\"  Discussed going to [facility] to be tested as order placed, patient prefers to try to obtain test from local pharmacy.  She is going to seek out test and update care team with results once she is able to get tested.   "

## 2023-12-28 NOTE — TELEPHONE ENCOUNTER
Placed order for COVID testing.  Please see if she has tested for this yet.    Agustin Okeefe MD  General Internal Medicine  [facility]  12/28/2023, 9:25 AM

## 2023-12-28 NOTE — TELEPHONE ENCOUNTER
FYI - Status Update    Who is Calling: patient    Update: Patient took Covid Home Test Results came back negative.  Would like to know if Dr. Okeefe will prescribe anything for her, she feels miserable.     Does caller want a call/response back: Yes     Could we send this information to you in [employer] or would you prefer to receive a phone call?:   Patient would prefer a phone call   Okay to leave a detailed message?: Yes at Home number on file [phone] (home)

## 2023-12-28 NOTE — TELEPHONE ENCOUNTER
Patient calling looking for antibiotics  prescription     -got sick yesterday     -Throat was sore and moved to right ear  -Phlegm went from yellow to green  -headache low grade   -lots of congestion  -does have asthma    temp 97.7    up to date on vaccines     -She has had sinus and strep throat problems in the past and she has used antibiotics to help     Contact Information  [phone] (Mobile)

## 2023-12-29 NOTE — TELEPHONE ENCOUNTER
Spoke with patient regarding her sx and she stated it feels like a sinus infection. I stated given your sx and history that we recommend  she be see in urgent care.

## 2023-12-29 NOTE — TELEPHONE ENCOUNTER
Usually an infection of 24-48 hours is viral and not bacterial - that is - something that antibiotics will not help.    If she is miserable I would recommend evaluation at [facility] urgent care or [facility].    Agustin Okeefe MD  General Internal Medicine  [facility]  12/29/2023, 6:33 AM

## 2024-01-02 ENCOUNTER — TELEPHONE (OUTPATIENT)
Dept: INTERNAL MEDICINE | Facility: CLINIC | Age: 84
End: 2024-01-02
Payer: COMMERCIAL

## 2024-01-02 DIAGNOSIS — I10 BENIGN ESSENTIAL HYPERTENSION: ICD-10-CM

## 2024-01-02 DIAGNOSIS — J32.9 RHINOSINUSITIS: Primary | ICD-10-CM

## 2024-01-02 RX ORDER — ATENOLOL 25 MG/1
25 TABLET ORAL DAILY
Qty: 90 TABLET | Refills: 3 | Status: SHIPPED | OUTPATIENT
Start: 2024-01-02 | End: 2024-07-11

## 2024-01-02 RX ORDER — DOXYCYCLINE 100 MG/1
100 CAPSULE ORAL 2 TIMES DAILY
Qty: 14 CAPSULE | Refills: 0 | Status: SHIPPED | OUTPATIENT
Start: 2024-01-02 | End: 2024-01-09

## 2024-01-02 NOTE — TELEPHONE ENCOUNTER
Sent in prescription for doxycycline.    Please be aware if this is a viral infection, this may not help.    She will need to be seen if not improving likely through W[facility]ARE.    Please help instruct on Evisit for future events.    Agustin Okeefe MD  General Internal Medicine  [facility]  1/2/2024, 12:36 PM

## 2024-01-02 NOTE — TELEPHONE ENCOUNTER
Pt notified, verbalized understanding.   Has not used [employer] since 2022    Thanked provider for sending in Rx

## 2024-01-02 NOTE — TELEPHONE ENCOUNTER
Medication Question or Refill    Contacts         Type Contact Phone/Fax    01/02/2024 06:21 AM CST Phone (Incoming) Aranza Mcmanus (Self) [phone] (M)            What medication are you calling about (include dose and sig)?: atenolol (TENORMIN) 25 MG tablet Sig - Route: Take 1 tablet (25 mg) by mouth every morning - Oral     Preferred Pharmacy:   [facility]/pharmacy #[id number] - [address] [address]  Phone: [phone] Fax: [phone]      Controlled Substance Agreement on file:   CSA -- Patient Level:    CSA: None found at the patient level.       Who prescribed the medication?: Dr Torres    Do you need a refill? Yes    When did you use the medication last? This morning    Patient offered an appointment? Yes: Patient declined     Do you have any questions or concerns?  No      Could we send this information to you in [facility] or would you prefer to receive a phone call?:   Patient would prefer a phone call   Okay to leave a detailed message?: Yes at Home number on file [phone] (home)

## 2024-01-02 NOTE — TELEPHONE ENCOUNTER
Writer called patient to get more information.  Patient explains she began feeling ill last Wednesday, was tested for covid (home test) and that was negative.  Currently has dull headache, sore throat, sinus pressure/discomfort, cough and congestion.  She is seeking antibiotic, feels she may have pneumonia.  Writer did advise patient should be evaluated in person today, patient wishes PCP to receive message and advise.

## 2024-01-02 NOTE — TELEPHONE ENCOUNTER
General Call    Contacts         Type Contact Phone/Fax    01/02/2024 06:24 AM CST Phone (Incoming) Aranza Mcmanus (Self) [phone] (M)          Reason for Call:  Patient said they would like a call back as soon as possible     What are your questions or concerns:  Patient would not elaborate any specific concerns to writer.     Date of last appointment with provider: 12/21/2023    Could we send this information to you in [facility] or would you prefer to receive a phone call?:   Patient would prefer a phone call   Okay to leave a detailed message?: Yes at Home number on file [phone] (home)

## 2024-02-14 ENCOUNTER — OFFICE VISIT (OUTPATIENT)
Dept: PULMONOLOGY | Facility: CLINIC | Age: 84
End: 2024-02-14
Attending: INTERNAL MEDICINE
Payer: COMMERCIAL

## 2024-02-14 VITALS
WEIGHT: 197 LBS | HEART RATE: 66 BPM | OXYGEN SATURATION: 96 % | SYSTOLIC BLOOD PRESSURE: 124 MMHG | DIASTOLIC BLOOD PRESSURE: 70 MMHG | BODY MASS INDEX: 38.47 KG/M2

## 2024-02-14 DIAGNOSIS — J44.89 ASTHMA-COPD OVERLAP SYNDROME (H): Primary | ICD-10-CM

## 2024-02-14 PROCEDURE — 99214 OFFICE O/P EST MOD 30 MIN: CPT | Performed by: INTERNAL MEDICINE

## 2024-02-14 RX ORDER — BECLOMETHASONE DIPROPIONATE HFA 80 UG/1
1 AEROSOL, METERED RESPIRATORY (INHALATION) 2 TIMES DAILY
COMMUNITY
End: 2024-05-31

## 2024-02-14 NOTE — LETTER
2/14/2024         RE: Aranza Mcmanus  [address]  [place] [address]        Dear Colleague,    Thank you for referring your patient, Aranza Mcmanus, to the [facility]. Please see a copy of my visit note below.    Pulmonary Clinic Follow-up Visit    Assessment and Plan:   83 year old lady with a history of asthma/reactive airway disease - evaluated at [facility] many years ago, records not available to me, obesity, HTN, HLD, former minimal smoking history, hx of small PE in 2020 treated with DOAC, presents for asthma/COPD follow up. PFTs showed normal spirometry with borderline reduced FEV1/FVC ratio, positive response to bronchodilator, evidence of small airways disease as well as moderately impaired diffusing capacity for CO. The latter is likely due second hand some exposure and emphysema, which was confirmed on her CT scan from 2020. She probably has some asthma/COPD overlap features given the bronchodilator responsiveness and borderline reduction in FEV1/FVC ratio.  She seems to be doing well on current inhaler regimen. She is probably covid positive today but lung exam and SpO2 are normal today.     Recommendations:  - continue the Qvar 1 puff bid. Rinse/gargle/spit after use.  - continue albuterol rescue inhaler/neb as needed  - encouraged her to exercise and remain active. Weight loss as able  - did not address pulmlonary rehab today.  - covid precautions reinforced.   - UTD with vaccinations.    Follow up in 6 months.     Manish Morales MD (Avi)  [facility]  Clinic [phone]  Fax [phone]      CCx: asthma/COPD evaluation    HPI: Interim history: Aranza was last seen in April 2023.   Since that time, she reports she's doing OK. She has some flare ups of her lung disease. She has issues with smoke exposure in her building.   She has been coughing the last few days and bringing up mucus, otherwise  "she's feeling OK.   She brought in a positive covid test today, she did not notice it was positive.    ROS:  A 12-system review was obtained and was negative with the exception of the symptoms endorsed in the history of present illness.   She uses Qvar and proair as needed.    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 Right upper quadrant pain.  COMPARISON: None.  TECHNIQUE: CT angiogram chest during arterial phase injection IV contrast. 2D and 3D MIP reconstructions were performed by the CT technologist. Dose reduction techniques were used.  CONTRAST: Iohexol (Omni) 100 mL   FINDIN       PSH:  Past Surgical History:   Procedure Laterality Date     ANKLE SURGERY Left      BLADDER SURGERY      \"bladder lift\"     CATARACT EXTRACTION Bilateral 2023     CHOLECYSTECTOMY  2020     FOOT SURGERY Left      HYSTERECTOMY  01/01/1998    LAVH     TONSILLECTOMY & ADENOIDECTOMY Bilateral      TOTAL HIP ARTHROPLASTY Left        Allergies:  Allergies   Allergen Reactions     Morphine      Other reaction(s): Hallucinations     Valacyclovir Hives and Unknown     Azithromycin Nausea and Vomiting and Rash     And fever reported.     Mold      Other reaction(s): Runny Nose, Wheezing     Oxycodone Other (See Comments)     Too strong.      Perfume      Other reaction(s): Runny Nose, Wheezing     Tobacco [Tobacco]      Other reaction(s): Runny Nose, " "Wheezing    \"Smoke\"       Family HX:  Family History   Problem Relation Age of Onset     Crohn's Disease Mother      Hypertension Mother      Kidney Cancer Father      Breast Cancer Maternal Grandmother 30        bilateral mastectomy done yrs ago.     Colon Cancer Paternal Grandmother      Cancer Brother         Metastatic parotid gland tumor     Coronary Artery Disease Brother      Dementia Brother      Lung Cancer Sister      Atrial fibrillation Son      Cardiomyopathy Son      Coronary Artery Disease Son      No Known Problems Son      No Known Problems Daughter        Social Hx:  Social History     Socioeconomic History     Marital status: Single     Spouse name: Not on file     Number of children: Not on file     Years of education: Not on file     Highest education level: Not on file   Occupational History     Not on file   Tobacco Use     Smoking status: Former     Packs/day: 1.00     Years: 6.00     Additional pack years: 0.00     Total pack years: 6.00     Types: Cigarettes     Quit date: 1975     Years since quittin.1     Passive exposure: Current     Smokeless tobacco: Never   Vaping Use     Vaping Use: Never used   Substance and Sexual Activity     Alcohol use: Yes     Alcohol/week: 0.8 standard drinks of alcohol     Comment: Alcoholic Drinks/day: occ.     Drug use: Not Currently     Types: Marijuana     Sexual activity: Not on file   Other Topics Concern     Not on file   Social History Narrative    3 childen, 2 sons and 1 daughter.        Previous long term patient of Dr. Piper Greene.        Hobbies include playing guitar and other stringed instruments.          Social Determinants of Health     Financial Resource Strain: Low Risk  (2023)    Financial Resource Strain      Within the past 12 months, have you or your family members you live with been unable to get utilities (heat, electricity) when it was really needed?: No   Food Insecurity: Low Risk  (2023)    Food " Insecurity      Within the past 12 months, did you worry that your food would run out before you got money to buy more?: No      Within the past 12 months, did the food you bought just not last and you didn t have money to get more?: No   Transportation Needs: Low Risk  (12/21/2023)    Transportation Needs      Within the past 12 months, has lack of transportation kept you from medical appointments, getting your medicines, non-medical meetings or appointments, work, or from getting things that you need?: No   Physical Activity: Not on file   Stress: Not on file   Social Connections: Not on file   Interpersonal Safety: Low Risk  (12/21/2023)    Interpersonal Safety      Do you feel physically and emotionally safe where you currently live?: Yes      Within the past 12 months, have you been hit, slapped, kicked or otherwise physically hurt by someone?: No      Within the past 12 months, have you been humiliated or emotionally abused in other ways by your partner or ex-partner?: No   Housing Stability: Low Risk  (12/21/2023)    Housing Stability      Do you have housing? : Yes      Are you worried about losing your housing?: No       Current Meds:  Current Outpatient Medications   Medication Sig Dispense Refill     acetaminophen (TYLENOL) 500 MG tablet Take 500 mg by mouth every 8 hours as needed for mild pain       amLODIPine (NORVASC) 5 MG tablet TAKE 1 TABLET (5 MG) BY MOUTH EVERY EVENING TAKE WITH THE PRAVASTATIN (PRAVACHOL) 90 tablet 3     atenolol (TENORMIN) 25 MG tablet Take 1 tablet (25 mg) by mouth daily for 360 days 90 tablet 3     calcium carbonate (OS-STERLING) 600 mg calcium (1,500 mg) tablet [CALCIUM CARBONATE (OS-STERLING) 600 MG CALCIUM (1,500 MG) TABLET] Take 600 mg by mouth daily.       CHOLECALCIFEROL PO Take 2,000 Units by mouth daily       diphenhydrAMINE-acetaminophen (TYLENOL PM)  MG tablet Take 1 tablet by mouth nightly as needed for sleep       irbesartan (AVAPRO) 300 MG tablet Take 1 tablet (300  mg) by mouth daily 90 tablet 3     multivitamin therapeutic (THERAGRAN) tablet [MULTIVITAMIN THERAPEUTIC (THERAGRAN) TABLET] Take 1 tablet by mouth every morning.       pravastatin (PRAVACHOL) 40 MG tablet TAKE 1 TABLET BY MOUTH EVERY DAY IN THE MORNING 90 tablet 2     prednisoLONE acetate (PRED FORTE) 1 % ophthalmic suspension INSTILL 1 DROP INTO RIGHT EYE 4 TIMES A DAY. REDUCE BY 1 DROP EVERY 7 DAYS. START AFTER SURGERY.       PROAIR RESPICLICK 108 (90 Base) MCG/ACT inhaler INHALE 1 PUFF BY MOUTH EVERY 4 HOURS AS NEEDED 1 each 6     QVAR REDIHALER 80 MCG/ACT inhaler INHALE 1 PUFF BY MOUTH 2 TIMES DAILY. DOESN'T NEED A SPACER OR SHAKING. 21.2 g 3       Physical Exam:  LMP  (LMP Unknown)   Gen: awake, alert, oriented, no distress  HEENT: nasal turbinates are unremarkable, no oropharyngeal lesions, no cervical or supraclavicular lymphadenopathy  CV: RRR, no M/G/R  Resp: mild bibasilar crackles, no wheezing or rhonchi. Good air movement.   Skin: no apparent rashes  Ext: no cyanosis, clubbing or edema  Neuro: alert, nonfocal    Labs:  Reviewed  Chem panel OK  CBC normal, no diff.  hgb 14.0    Imaging studies:  CTA chest from 2020:  IMPRESSION:  1.  Pulmonary emboli right lower lobe.  2.  Small clot burden.  3.  No evidence of heart strain.  4.  Trace right basilar effusion.  5.  Normal right basilar atelectasis.  6.  No aneurysm or dissection involving the thoracic aorta    Pulmonary Function Testing  4/13/2022  FEV1 1.59L, 78% (> LLN)  FVC 90%  +BD response  Ratio 0.66 (> LLN)  FEF 25-75% 53% with +BD response  %  DLco 54% roseline for hgb.  Flow volume loop suggests small airways disease.     Again, thank you for allowing me to participate in the care of your patient.        Sincerely,        Manish Morales MD

## 2024-02-14 NOTE — PROGRESS NOTES
Pulmonary Clinic Follow-up Visit    Assessment and Plan:   83 year old lady with a history of asthma/reactive airway disease - evaluated at [facility] many years ago, records not available to me, obesity, HTN, HLD, former minimal smoking history, hx of small PE in 2020 treated with DOAC, presents for asthma/COPD follow up. PFTs showed normal spirometry with borderline reduced FEV1/FVC ratio, positive response to bronchodilator, evidence of small airways disease as well as moderately impaired diffusing capacity for CO. The latter is likely due second hand some exposure and emphysema, which was confirmed on her CT scan from 2020. She probably has some asthma/COPD overlap features given the bronchodilator responsiveness and borderline reduction in FEV1/FVC ratio.  She seems to be doing well on current inhaler regimen. She is probably covid positive today but lung exam and SpO2 are normal today.     Recommendations:  - continue the Qvar 1 puff bid. Rinse/gargle/spit after use.  - continue albuterol rescue inhaler/neb as needed  - encouraged her to exercise and remain active. Weight loss as able  - did not address pulmlonary rehab today.  - covid precautions reinforced.   - UTD with vaccinations.    Follow up in 6 months.     Manish Morales MD (Avi)  [facility] Pulmonary & Critical Care  Clinic [phone]  Fax [phone]      CCx: asthma/COPD evaluation    HPI: Interim history: Aranza was last seen in April 2023.   Since that time, she reports she's doing OK. She has some flare ups of her lung disease. She has issues with smoke exposure in her building.   She has been coughing the last few days and bringing up mucus, otherwise she's feeling OK.   She brought in a positive covid test today, she did not notice it was positive.    ROS:  A 12-system review was obtained and was negative with the exception of the symptoms endorsed in the history of present illness.   She uses Qvar and proair  "as needed.    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 Right upper quadrant pain.  COMPARISON: None.  TECHNIQUE: CT angiogram chest during arterial phase injection IV contrast. 2D and 3D MIP reconstructions were performed by the CT technologist. Dose reduction techniques were used.  CONTRAST: Iohexol (Omni) 100 mL   FINDIN       PSH:  Past Surgical History:   Procedure Laterality Date    ANKLE SURGERY Left     BLADDER SURGERY      \"bladder lift\"    CATARACT EXTRACTION Bilateral 2023    CHOLECYSTECTOMY  2020    FOOT SURGERY Left     HYSTERECTOMY  01/01/1998    LAVH    TONSILLECTOMY & ADENOIDECTOMY Bilateral     TOTAL HIP ARTHROPLASTY Left        Allergies:  Allergies   Allergen Reactions    Morphine      Other reaction(s): Hallucinations    Valacyclovir Hives and Unknown    Azithromycin Nausea and Vomiting and Rash     And fever reported.    Mold      Other reaction(s): Runny Nose, Wheezing    Oxycodone Other (See Comments)     Too strong.     Perfume      Other reaction(s): Runny Nose, Wheezing    Tobacco [Tobacco]      Other reaction(s): Runny Nose, Wheezing    \"Smoke\"       Family HX:  Family History   Problem Relation Age of Onset    Crohn's Disease Mother     Hypertension Mother     Kidney Cancer Father     Breast Cancer Maternal Grandmother 30        bilateral mastectomy done yrs ago.    Colon Cancer Paternal Grandmother     Cancer " Brother         Metastatic parotid gland tumor    Coronary Artery Disease Brother     Dementia Brother     Lung Cancer Sister     Atrial fibrillation Son     Cardiomyopathy Son     Coronary Artery Disease Son     No Known Problems Son     No Known Problems Daughter        Social Hx:  Social History     Socioeconomic History    Marital status: Single     Spouse name: Not on file    Number of children: Not on file    Years of education: Not on file    Highest education level: Not on file   Occupational History    Not on file   Tobacco Use    Smoking status: Former     Packs/day: 1.00     Years: 6.00     Additional pack years: 0.00     Total pack years: 6.00     Types: Cigarettes     Quit date: 1975     Years since quittin.1     Passive exposure: Current    Smokeless tobacco: Never   Vaping Use    Vaping Use: Never used   Substance and Sexual Activity    Alcohol use: Yes     Alcohol/week: 0.8 standard drinks of alcohol     Comment: Alcoholic Drinks/day: occ.    Drug use: Not Currently     Types: Marijuana    Sexual activity: Not on file   Other Topics Concern    Not on file   Social History Narrative    3 childen, 2 sons and 1 daughter.        Previous long term patient of Dr. Piper Greene.        Hobbies include playing guitar and other stringed instruments.          Social Determinants of Health     Financial Resource Strain: Low Risk  (2023)    Financial Resource Strain     Within the past 12 months, have you or your family members you live with been unable to get utilities (heat, electricity) when it was really needed?: No   Food Insecurity: Low Risk  (2023)    Food Insecurity     Within the past 12 months, did you worry that your food would run out before you got money to buy more?: No     Within the past 12 months, did the food you bought just not last and you didn t have money to get more?: No   Transportation Needs: Low Risk  (2023)    Transportation Needs     Within the past 12  months, has lack of transportation kept you from medical appointments, getting your medicines, non-medical meetings or appointments, work, or from getting things that you need?: No   Physical Activity: Not on file   Stress: Not on file   Social Connections: Not on file   Interpersonal Safety: Low Risk  (12/21/2023)    Interpersonal Safety     Do you feel physically and emotionally safe where you currently live?: Yes     Within the past 12 months, have you been hit, slapped, kicked or otherwise physically hurt by someone?: No     Within the past 12 months, have you been humiliated or emotionally abused in other ways by your partner or ex-partner?: No   Housing Stability: Low Risk  (12/21/2023)    Housing Stability     Do you have housing? : Yes     Are you worried about losing your housing?: No       Current Meds:  Current Outpatient Medications   Medication Sig Dispense Refill    acetaminophen (TYLENOL) 500 MG tablet Take 500 mg by mouth every 8 hours as needed for mild pain      amLODIPine (NORVASC) 5 MG tablet TAKE 1 TABLET (5 MG) BY MOUTH EVERY EVENING TAKE WITH THE PRAVASTATIN (PRAVACHOL) 90 tablet 3    atenolol (TENORMIN) 25 MG tablet Take 1 tablet (25 mg) by mouth daily for 360 days 90 tablet 3    calcium carbonate (OS-STERLING) 600 mg calcium (1,500 mg) tablet [CALCIUM CARBONATE (OS-STERLING) 600 MG CALCIUM (1,500 MG) TABLET] Take 600 mg by mouth daily.      CHOLECALCIFEROL PO Take 2,000 Units by mouth daily      diphenhydrAMINE-acetaminophen (TYLENOL PM)  MG tablet Take 1 tablet by mouth nightly as needed for sleep      irbesartan (AVAPRO) 300 MG tablet Take 1 tablet (300 mg) by mouth daily 90 tablet 3    multivitamin therapeutic (THERAGRAN) tablet [MULTIVITAMIN THERAPEUTIC (THERAGRAN) TABLET] Take 1 tablet by mouth every morning.      pravastatin (PRAVACHOL) 40 MG tablet TAKE 1 TABLET BY MOUTH EVERY DAY IN THE MORNING 90 tablet 2    prednisoLONE acetate (PRED FORTE) 1 % ophthalmic suspension INSTILL 1 DROP INTO  RIGHT EYE 4 TIMES A DAY. REDUCE BY 1 DROP EVERY 7 DAYS. START AFTER SURGERY.      PROAIR RESPICLICK 108 (90 Base) MCG/ACT inhaler INHALE 1 PUFF BY MOUTH EVERY 4 HOURS AS NEEDED 1 each 6    QVAR REDIHALER 80 MCG/ACT inhaler INHALE 1 PUFF BY MOUTH 2 TIMES DAILY. DOESN'T NEED A SPACER OR SHAKING. 21.2 g 3       Physical Exam:  LMP  (LMP Unknown)   Gen: awake, alert, oriented, no distress  HEENT: nasal turbinates are unremarkable, no oropharyngeal lesions, no cervical or supraclavicular lymphadenopathy  CV: RRR, no M/G/R  Resp: mild bibasilar crackles, no wheezing or rhonchi. Good air movement.   Skin: no apparent rashes  Ext: no cyanosis, clubbing or edema  Neuro: alert, nonfocal    Labs:  Reviewed  Chem panel OK  CBC normal, no diff.  hgb 14.0    Imaging studies:  CTA chest from 2020:  IMPRESSION:  1.  Pulmonary emboli right lower lobe.  2.  Small clot burden.  3.  No evidence of heart strain.  4.  Trace right basilar effusion.  5.  Normal right basilar atelectasis.  6.  No aneurysm or dissection involving the thoracic aorta    Pulmonary Function Testing  4/13/2022  FEV1 1.59L, 78% (> LLN)  FVC 90%  +BD response  Ratio 0.66 (> LLN)  FEF 25-75% 53% with +BD response  %  DLco 54% roseline for hgb.  Flow volume loop suggests small airways disease.

## 2024-04-21 DIAGNOSIS — I10 PRIMARY HYPERTENSION: ICD-10-CM

## 2024-04-21 RX ORDER — IRBESARTAN 300 MG/1
300 TABLET ORAL DAILY
Qty: 90 TABLET | Refills: 3 | Status: SHIPPED | OUTPATIENT
Start: 2024-04-21

## 2024-04-29 DIAGNOSIS — E78.00 HYPERCHOLESTEREMIA: ICD-10-CM

## 2024-04-29 RX ORDER — PRAVASTATIN SODIUM 40 MG
40 TABLET ORAL EVERY MORNING
Qty: 90 TABLET | Refills: 3 | Status: SHIPPED | OUTPATIENT
Start: 2024-04-29

## 2024-05-31 DIAGNOSIS — J45.50 SEVERE PERSISTENT ASTHMA, UNCOMPLICATED (H): ICD-10-CM

## 2024-05-31 RX ORDER — BECLOMETHASONE DIPROPIONATE HFA 80 UG/1
1 AEROSOL, METERED RESPIRATORY (INHALATION) 2 TIMES DAILY
Qty: 21.2 G | Refills: 3 | Status: SHIPPED | OUTPATIENT
Start: 2024-05-31

## 2024-07-05 ENCOUNTER — OFFICE VISIT (OUTPATIENT)
Dept: INTERNAL MEDICINE | Facility: CLINIC | Age: 84
End: 2024-07-05
Payer: COMMERCIAL

## 2024-07-05 VITALS
OXYGEN SATURATION: 96 % | HEIGHT: 65 IN | SYSTOLIC BLOOD PRESSURE: 122 MMHG | DIASTOLIC BLOOD PRESSURE: 72 MMHG | WEIGHT: 202.8 LBS | RESPIRATION RATE: 16 BRPM | HEART RATE: 71 BPM | BODY MASS INDEX: 33.79 KG/M2 | TEMPERATURE: 98 F

## 2024-07-05 DIAGNOSIS — M79.662 PAIN IN BOTH LOWER LEGS: ICD-10-CM

## 2024-07-05 DIAGNOSIS — R09.89 OTHER SPECIFIED SYMPTOMS AND SIGNS INVOLVING THE CIRCULATORY AND RESPIRATORY SYSTEMS: ICD-10-CM

## 2024-07-05 DIAGNOSIS — R20.0 LEFT LEG NUMBNESS: Primary | ICD-10-CM

## 2024-07-05 DIAGNOSIS — M79.661 PAIN IN BOTH LOWER LEGS: ICD-10-CM

## 2024-07-05 DIAGNOSIS — I26.99 RIGHT PULMONARY EMBOLUS (H): ICD-10-CM

## 2024-07-05 DIAGNOSIS — N18.31 CHRONIC KIDNEY DISEASE, STAGE 3A (H): ICD-10-CM

## 2024-07-05 PROCEDURE — G2211 COMPLEX E/M VISIT ADD ON: HCPCS | Performed by: INTERNAL MEDICINE

## 2024-07-05 PROCEDURE — 99215 OFFICE O/P EST HI 40 MIN: CPT | Performed by: INTERNAL MEDICINE

## 2024-07-05 ASSESSMENT — ASTHMA QUESTIONNAIRES
ACT_TOTALSCORE: 15
ACT_TOTALSCORE: 19
QUESTION_5 LAST FOUR WEEKS HOW WOULD YOU RATE YOUR ASTHMA CONTROL: POORLY CONTROLLED
QUESTION_4 LAST FOUR WEEKS HOW OFTEN HAVE YOU USED YOUR RESCUE INHALER OR NEBULIZER MEDICATION (SUCH AS ALBUTEROL): ONE OR TWO TIMES PER DAY
QUESTION_2 LAST FOUR WEEKS HOW OFTEN HAVE YOU HAD SHORTNESS OF BREATH: NOT AT ALL
QUESTION_1 LAST FOUR WEEKS HOW MUCH OF THE TIME DID YOUR ASTHMA KEEP YOU FROM GETTING AS MUCH DONE AT WORK, SCHOOL OR AT HOME: SOME OF THE TIME
QUESTION_1 LAST FOUR WEEKS HOW MUCH OF THE TIME DID YOUR ASTHMA KEEP YOU FROM GETTING AS MUCH DONE AT WORK, SCHOOL OR AT HOME: NONE OF THE TIME
QUESTION_2 LAST FOUR WEEKS HOW OFTEN HAVE YOU HAD SHORTNESS OF BREATH: THREE TO SIX TIMES A WEEK
ACT_TOTALSCORE: 15
QUESTION_3 LAST FOUR WEEKS HOW OFTEN DID YOUR ASTHMA SYMPTOMS (WHEEZING, COUGHING, SHORTNESS OF BREATH, CHEST TIGHTNESS OR PAIN) WAKE YOU UP AT NIGHT OR EARLIER THAN USUAL IN THE MORNING: NOT AT ALL
QUESTION_4 LAST FOUR WEEKS HOW OFTEN HAVE YOU USED YOUR RESCUE INHALER OR NEBULIZER MEDICATION (SUCH AS ALBUTEROL): ONE OR TWO TIMES PER DAY
QUESTION_5 LAST FOUR WEEKS HOW WOULD YOU RATE YOUR ASTHMA CONTROL: POORLY CONTROLLED
QUESTION_5 LAST FOUR WEEKS HOW WOULD YOU RATE YOUR ASTHMA CONTROL: POORLY CONTROLLED
QUESTION_1 LAST FOUR WEEKS HOW MUCH OF THE TIME DID YOUR ASTHMA KEEP YOU FROM GETTING AS MUCH DONE AT WORK, SCHOOL OR AT HOME: SOME OF THE TIME
QUESTION_3 LAST FOUR WEEKS HOW OFTEN DID YOUR ASTHMA SYMPTOMS (WHEEZING, COUGHING, SHORTNESS OF BREATH, CHEST TIGHTNESS OR PAIN) WAKE YOU UP AT NIGHT OR EARLIER THAN USUAL IN THE MORNING: NOT AT ALL
ACT_TOTALSCORE: 15
QUESTION_2 LAST FOUR WEEKS HOW OFTEN HAVE YOU HAD SHORTNESS OF BREATH: THREE TO SIX TIMES A WEEK
QUESTION_3 LAST FOUR WEEKS HOW OFTEN DID YOUR ASTHMA SYMPTOMS (WHEEZING, COUGHING, SHORTNESS OF BREATH, CHEST TIGHTNESS OR PAIN) WAKE YOU UP AT NIGHT OR EARLIER THAN USUAL IN THE MORNING: NOT AT ALL
QUESTION_4 LAST FOUR WEEKS HOW OFTEN HAVE YOU USED YOUR RESCUE INHALER OR NEBULIZER MEDICATION (SUCH AS ALBUTEROL): ONE OR TWO TIMES PER DAY

## 2024-07-05 ASSESSMENT — PAIN SCALES - GENERAL: PAINLEVEL: NO PAIN (0)

## 2024-07-05 NOTE — PROGRESS NOTES
"  {PROVIDER CHARTING PREFERENCE:790828}    Subjective   Aranza is a 84 year old, presenting for the following health issues:  Fatigue (Patient states she has been really tired. /Patient states she noticed that she has been tired than before. /Patient states she would like to talk to the provide about Neuropathy./)        7/5/2024     3:16 PM   Additional Questions   Roomed by Jose J Pereyra MA     HPI     {MA/LPN/RN Pre-Provider Visit Orders- hCG/UA/Strep (Optional):026707}  {SUPERLIST (Optional):258312}  {additonal problems for provider to add (Optional):165577}    {ROS Picklists (Optional):636368}      Objective    /72 (BP Location: Right arm, Patient Position: Sitting, Cuff Size: Adult Regular)   Pulse 71   Temp 98  F (36.7  C) (Oral)   Resp 16   Ht 1.651 m (5' 5\")   Wt 92 kg (202 lb 12.8 oz)   LMP  (LMP Unknown)   SpO2 96%   BMI 33.75 kg/m    Body mass index is 33.75 kg/m .  Physical Exam   {Exam List (Optional):529462}    {Diagnostic Test Results (Optional):449338}        Signed Electronically by: Agustin Okeefe MD  {Email feedback regarding this note to [email or web address]   :[id number]}  "

## 2024-07-05 NOTE — PATIENT INSTRUCTIONS
Please schedule an appointment with [facility] by calling [phone].    They are located at:    [address] [place] [address]    ______________________________________________________________________     Future Appointments   Date Time Provider Department Center   7/22/2024  9:00 AM [facility]   8/15/2024 11:00 AM Manish Morales MD [facility]   10/2/2024  9:30 AM [facility]   10/4/2024 10:00 AM Manish Morales MD [facility]

## 2024-07-05 NOTE — PROGRESS NOTES
[facility]    Comprehensive and complex medical care - Chronic disease management - Shared decision making - Care coordination - Compassionate care    Patient advocacy - Rational deprescribing - Minimally disruptive medicine - Ethical focus - Customized care         Date of Service: 7/5/2024  Primary Provider: Agustin Okeefe    Patient Care Team:  Agustin Okeefe MD as PCP - General (Internal Medicine)  Agustin Okeefe MD as Assigned PCP  Debi Santiago MD as MD (Ophthalmology)  Ashley Mon MD as Assigned OBGYN Provider  Manish Morales MD as Assigned Pulmonology Provider          Patient's Pharmacy:    [facility]/pharm[facility] #[id number] - [place], [place] - [address] [address]  [address] [address]  Phone: [phone] Fax: [phone]     Patient's Contacts:  Name Home Phone Work Phone Mobile Phone Relationship Lgl WHITNEY Vu [phone]   Son      Patient's Insurance:    Payor: [employer]fm / Plan: [employer] MEDICARE / Product Type: HMO /            Active Problem List:  Problem List as of 7/5/2024 Reviewed: 10/13/2023  8:52 PM by Agustin Okeefe MD         High    Former smoker, stopped smoking in distant past    Right pulmonary embolus (H)       Medium    Primary hypertension    Osteoarthritis    Asthma-COPD overlap syndrome (H)       Low    Hypercholesteremia    Chronic GERD    Paresthesia of left foot        Current Outpatient Medications   Medication Instructions    acetaminophen (TYLENOL) 500 mg, Oral, EVERY 8 HOURS PRN    amLODIPine (NORVASC) 5 mg, Oral, EVERY EVENING, Take with the pravastatin (Pravachol)    atenolol (TENORMIN) 25 mg, Oral, DAILY    calcium 600 mg, DAILY    CHOLECALCIFEROL PO 2,000 Units, Oral, DAILY    diphenhydrAMINE-acetaminophen (TYLENOL PM)  MG tablet 1 tablet, Oral, AT BEDTIME PRN    irbesartan (AVAPRO) 300 mg, Oral, DAILY    multivitamin therapeutic (THERAGRAN) tablet 1 tablet, EVERY MORNING    pravastatin (PRAVACHOL)  40 mg, Oral, EVERY MORNING    prednisoLONE acetate (PRED FORTE) 1 % ophthalmic suspension     PROAIR RESPICLICK 108 (90 Base) MCG/ACT inhaler INHALE 1 PUFF BY MOUTH EVERY 4 HOURS AS NEEDED    QVAR REDIHALER 80 MCG/ACT inhaler 1 puff, Inhalation, 2 TIMES DAILY        Social History     Social History Narrative    3 childen, 2 sons and 1 daughter.        Previous long term patient of Dr. Piper Greene.        Hobbies include playing guitar and other stringed instruments.            Subjective:     Aranza Mcmanus is a 84 year old female who comes in today for:    Chief Complaint   Patient presents with    Fatigue     Patient states she has been really tired.   Patient states she noticed that she has been tired than before.   Patient states she would like to talk to the provide about Neuropathy.             7/5/2024     3:16 PM   Additional Questions   Roomed by Jose J Pereyra MA     Follow up multiple issues.    First reviewed her issues with her left leg.  It is buzzy she says in the leg up to the knee.  Leg also hurts mostly all day related to this.  Feels stiffness around the ankle.  Feels tightness as well.    Walks funny related to this and has some balance issues.  Wonders if spider veins on legs bother this.    Also noting increased tiredness.  This is in the last few months.  Maybe some mild dyspnea on exertion (GUERRERO) but no other symptoms.      Bowels doing okay.    Reviewed her hypertension today.  Blood pressure has been in the goal range.  Denies any excessive dizziness from the medication with this.     Left leg bothers her at night and can affect sleep.    Does have excessive sweating at night and this was reviewed.    We reviewed her other issues noted in the assessment but not specifically addressed in the HPI above.     Objective:     Wt Readings from Last 3 Encounters:   07/05/24 92 kg (202 lb 12.8 oz)   02/14/24 89.4 kg (197 lb)   12/21/23 90.3 kg (199 lb)     BP Readings from Last 3 Encounters:  "  07/05/24 122/72   02/14/24 124/70   12/21/23 124/60     /72 (BP Location: Right arm, Patient Position: Sitting, Cuff Size: Adult Regular)   Pulse 71   Temp 98  F (36.7  C) (Oral)   Resp 16   Ht 1.651 m (5' 5\")   Wt 92 kg (202 lb 12.8 oz)   LMP  (LMP Unknown)   SpO2 96%   BMI 33.75 kg/m     The patient is comfortable, no acute distress.  Mood good.  Insight good.  Eyes are nonicteric.  Neck is supple without mass.  No cervical adenopathy.  No thyromegaly. Heart regular rate and rhythm.  Lungs clear to auscultation bilaterally.  Respiratory effort is good.  Abdomen soft and nontender.  No hepatosplenomegaly.  Extremities trace left lower extremity edema.      Diagnostics:     Office Visit on 12/21/2023   Component Date Value Ref Range Status    Sodium 12/21/2023 139  135 - 145 mmol/L Final    Reference intervals for this test were updated on 09/26/2023 to more accurately reflect our healthy population. There may be differences in the flagging of prior results with similar values performed with this method. Interpretation of those prior results can be made in the context of the updated reference intervals.     Potassium 12/21/2023 4.5  3.4 - 5.3 mmol/L Final    Carbon Dioxide (CO2) 12/21/2023 25  22 - 29 mmol/L Final    Anion Gap 12/21/2023 11  7 - 15 mmol/L Final    Urea Nitrogen 12/21/2023 20.6  8.0 - 23.0 mg/dL Final    Creatinine 12/21/2023 1.09 (H)  0.51 - 0.95 mg/dL Final    GFR Estimate 12/21/2023 51 (L)  >60 mL/min/1.73m2 Final    Calcium 12/21/2023 9.7  8.8 - 10.2 mg/dL Final    Chloride 12/21/2023 103  98 - 107 mmol/L Final    Glucose 12/21/2023 95  70 - 99 mg/dL Final    Alkaline Phosphatase 12/21/2023 79  40 - 150 U/L Final    Reference intervals for this test were updated on 11/14/2023 to more accurately reflect our healthy population. There may be differences in the flagging of prior results with similar values performed with this method. Interpretation of those prior results can be made " in the context of the updated reference intervals.    AST 12/21/2023 20  0 - 45 U/L Final    Reference intervals for this test were updated on 6/12/2023 to more accurately reflect our healthy population. There may be differences in the flagging of prior results with similar values performed with this method. Interpretation of those prior results can be made in the context of the updated reference intervals.    ALT 12/21/2023 17  0 - 50 U/L Final    Reference intervals for this test were updated on 6/12/2023 to more accurately reflect our healthy population. There may be differences in the flagging of prior results with similar values performed with this method. Interpretation of those prior results can be made in the context of the updated reference intervals.      Protein Total 12/21/2023 6.5  6.4 - 8.3 g/dL Final    Albumin 12/21/2023 4.1  3.5 - 5.2 g/dL Final    Bilirubin Total 12/21/2023 0.4  <=1.2 mg/dL Final    Cholesterol 12/21/2023 152  <200 mg/dL Final    Triglycerides 12/21/2023 185 (H)  <150 mg/dL Final    Direct Measure HDL 12/21/2023 44 (L)  >=50 mg/dL Final    LDL Cholesterol Calculated 12/21/2023 71  <=100 mg/dL Final    Non HDL Cholesterol 12/21/2023 108  <130 mg/dL Final    Patient Fasting > 8hrs? 12/21/2023 Unknown   Final    Hemoglobin A1C 12/21/2023 5.6  0.0 - 5.6 % Final    Normal <5.7%   Prediabetes 5.7-6.4%    Diabetes 6.5% or higher     Note: Adopted from ADA consensus guidelines.    Vitamin D, Total (25-Hydroxy) 12/21/2023 59 (H)  20 - 50 ng/mL Final    indicates supplementation, with increased risk of hypercalciuria    WBC Count 12/21/2023 7.2  4.0 - 11.0 10e3/uL Final    RBC Count 12/21/2023 4.66  3.80 - 5.20 10e6/uL Final    Hemoglobin 12/21/2023 14.3  11.7 - 15.7 g/dL Final    Hematocrit 12/21/2023 43.0  35.0 - 47.0 % Final    MCV 12/21/2023 92  78 - 100 fL Final    MCH 12/21/2023 30.7  26.5 - 33.0 pg Final    MCHC 12/21/2023 33.3  31.5 - 36.5 g/dL Final    RDW 12/21/2023 12.4  10.0 -  15.0 % Final    Platelet Count 12/21/2023 212  150 - 450 10e3/uL Final    Iron 12/21/2023 61  37 - 145 ug/dL Final    Iron Binding Capacity 12/21/2023 272  240 - 430 ug/dL Final    Iron Sat Index 12/21/2023 22  15 - 46 % Final    Magnesium 12/21/2023 2.2  1.7 - 2.3 mg/dL Final    TSH 12/21/2023 1.18  0.30 - 4.20 uIU/mL Final    Vitamin B12 12/21/2023 694  232 - 1,245 pg/mL Final    Aldosterone 12/21/2023 10.3  0.0 - 31.0 ng/dL Final       No results found for any visits on 07/05/24.     Assessment and Plan:     1. Left leg numbness    - EMG; Future  - US Lower Extremity Arterial Duplex Bilateral; Future    2. Pain in both lower legs    - US Lower Extremity Arterial Duplex Bilateral; Future    3. Other specified symptoms and signs involving the circulatory and respiratory systems    - US Lower Extremity Arterial Duplex Bilateral; Future    4. Right pulmonary embolus (H)  No signs of recurrence.  Continue to monitor.    5. Chronic kidney disease, stage 3a (H)  This is mild and likely related to age and blood pressure.  Continue to monitor.     For the tiredness, we are going to have her try stopping the atenolol.    40 minutes or greater was spent today on the patient's care on the day of service.      This includes time for chart preparation, reviewing medical tests done before or during the visit, talking with the patient, review of quality indicators, required documentation, and other elements of care.     Continue current medications otherwise.  Follow up sooner if issues.          Agustin Okeefe MD  General Internal Medicine  [facility]    The longitudinal plan of care for the diagnoses and conditions as documented were addressed during this visit. Due to the added complexity in care, I will continue to support Aranza in the subsequent management and with ongoing continuity of care.     Return in about 6 months (around 1/2/2025) for annual wellness visit.     Future Appointments    Date Time Provider Department Center   7/22/2024  9:00 AM [facility] US 1 JNULTS [facility]   8/15/2024 11:00 AM Manish Morales MD [facility]   10/2/2024  9:30 AM [facility] EMG TECH NUNEU [facility]   10/4/2024 10:00 AM Manish Morales MD [facility]         HCC issues resolved at this visit.  Wt Readings from Last 20 Encounters:   07/05/24 92 kg (202 lb 12.8 oz)   02/14/24 89.4 kg (197 lb)   12/21/23 90.3 kg (199 lb)   10/19/23 91.2 kg (201 lb)   10/13/23 91.6 kg (202 lb)   06/29/23 89 kg (196 lb 4.8 oz)   05/09/23 87.1 kg (192 lb 1.6 oz)   04/18/23 89.8 kg (198 lb)   12/16/22 88.5 kg (195 lb)   12/02/22 86.6 kg (191 lb)   06/23/22 91.6 kg (202 lb)   04/13/22 91.6 kg (202 lb)   02/18/22 90.7 kg (200 lb)   12/13/21 90.9 kg (200 lb 6 oz)   09/08/21 92.2 kg (203 lb 4 oz)   02/17/15 91.6 kg (202 lb)     BP Readings from Last 20 Encounters:   07/05/24 122/72   02/14/24 124/70   12/21/23 124/60   10/19/23 126/70   10/13/23 128/72   06/29/23 126/84   04/18/23 128/80   12/18/22 138/88   12/02/22 (!) 196/92   06/23/22 126/72   04/13/22 122/70   02/28/22 136/72   02/18/22 (!) 142/70   12/13/21 (!) 142/80   09/08/21 (!) 158/86      Pulse Readings from Last 20 Encounters:   07/05/24 71   02/14/24 66   12/21/23 63   10/19/23 76   10/13/23 58   06/29/23 60   05/09/23 68   04/18/23 60   12/16/22 65   12/02/22 62   04/13/22 66   02/24/22 75   02/18/22 61   12/13/21 72   09/08/21 72     SpO2 Readings from Last 20 Encounters:   07/05/24 96%   02/14/24 96%   12/21/23 95%   10/19/23 97%   10/13/23 96%   06/29/23 96%   05/09/23 98%   04/18/23 97%   12/16/22 96%   12/02/22 99%   04/13/22 96%   02/24/22 98%   02/18/22 95%

## 2024-07-11 PROBLEM — N18.31 CHRONIC KIDNEY DISEASE, STAGE 3A (H): Status: ACTIVE | Noted: 2024-07-11

## 2024-07-22 ENCOUNTER — HOSPITAL ENCOUNTER (OUTPATIENT)
Dept: ULTRASOUND IMAGING | Facility: HOSPITAL | Age: 84
Discharge: HOME | End: 2024-07-22
Attending: INTERNAL MEDICINE | Admitting: INTERNAL MEDICINE
Payer: COMMERCIAL

## 2024-07-22 DIAGNOSIS — R09.89 OTHER SPECIFIED SYMPTOMS AND SIGNS INVOLVING THE CIRCULATORY AND RESPIRATORY SYSTEMS: ICD-10-CM

## 2024-07-22 DIAGNOSIS — R20.0 LEFT LEG NUMBNESS: ICD-10-CM

## 2024-07-22 DIAGNOSIS — M79.661 PAIN IN BOTH LOWER LEGS: ICD-10-CM

## 2024-07-22 DIAGNOSIS — M79.662 PAIN IN BOTH LOWER LEGS: ICD-10-CM

## 2024-07-22 PROCEDURE — 93924 LWR XTR VASC STDY BILAT: CPT

## 2024-08-15 ENCOUNTER — OFFICE VISIT (OUTPATIENT)
Dept: PULMONOLOGY | Facility: CLINIC | Age: 84
End: 2024-08-15
Attending: INTERNAL MEDICINE
Payer: COMMERCIAL

## 2024-08-15 VITALS
DIASTOLIC BLOOD PRESSURE: 74 MMHG | SYSTOLIC BLOOD PRESSURE: 112 MMHG | BODY MASS INDEX: 33.75 KG/M2 | WEIGHT: 202.8 LBS | HEART RATE: 76 BPM | OXYGEN SATURATION: 96 %

## 2024-08-15 DIAGNOSIS — J44.89 ASTHMA-COPD OVERLAP SYNDROME (H): Primary | ICD-10-CM

## 2024-08-15 PROCEDURE — 99213 OFFICE O/P EST LOW 20 MIN: CPT | Performed by: INTERNAL MEDICINE

## 2024-08-15 PROCEDURE — G2211 COMPLEX E/M VISIT ADD ON: HCPCS | Performed by: INTERNAL MEDICINE

## 2024-08-15 ASSESSMENT — ASTHMA QUESTIONNAIRES
QUESTION_1 LAST FOUR WEEKS HOW MUCH OF THE TIME DID YOUR ASTHMA KEEP YOU FROM GETTING AS MUCH DONE AT WORK, SCHOOL OR AT HOME: A LITTLE OF THE TIME
ACT_TOTALSCORE: 19
ACT_TOTALSCORE: 19
QUESTION_2 LAST FOUR WEEKS HOW OFTEN HAVE YOU HAD SHORTNESS OF BREATH: THREE TO SIX TIMES A WEEK
QUESTION_5 LAST FOUR WEEKS HOW WOULD YOU RATE YOUR ASTHMA CONTROL: WELL CONTROLLED
QUESTION_4 LAST FOUR WEEKS HOW OFTEN HAVE YOU USED YOUR RESCUE INHALER OR NEBULIZER MEDICATION (SUCH AS ALBUTEROL): ONCE A WEEK OR LESS
QUESTION_3 LAST FOUR WEEKS HOW OFTEN DID YOUR ASTHMA SYMPTOMS (WHEEZING, COUGHING, SHORTNESS OF BREATH, CHEST TIGHTNESS OR PAIN) WAKE YOU UP AT NIGHT OR EARLIER THAN USUAL IN THE MORNING: ONCE OR TWICE

## 2024-08-15 NOTE — PROGRESS NOTES
Pulmonary Clinic Follow-up Visit    Assessment and Plan:   84 year old lady with a history of asthma/reactive airway disease - evaluated at [facility] many years ago, obesity, HTN, HLD, former minimal smoking history, hx of small PE in 2020 treated with DOAC, presents for asthma/COPD follow up. PFTs showed normal spirometry with borderline reduced FEV1/FVC ratio, positive response to bronchodilator, evidence of small airways disease as well as moderately impaired diffusing capacity for CO. The latter is likely due second hand some exposure and emphysema, which was confirmed on her CT scan from 2020. She probably has some asthma/COPD overlap features given the bronchodilator responsiveness and borderline reduction in FEV1/FVC ratio. She is doing well on current regimen. Lung exam and SpO2 are normal today.     Recommendations:  - continue the Qvar 1-2 puffs bid. Rinse/gargle/spit after use.  - continue albuterol rescue inhaler/neb as needed  - encouraged her to exercise and remain active. Weight loss as able  - UTD with vaccinations. Should get PCV20 in 2025. Should get RSV vaccine; info given - she'll discuss with her PMD.    Follow up in 1 year or sooner if needed.    The longitudinal plan of care for the diagnosis(es)/condition(s) as documented were addressed during this visit. Due to the added complexity in care, I will continue to support Aranza in the subsequent management and with ongoing continuity of care.     Manish Morales MD (Avi)  [facility]  Clinic [phone]  Fax [phone]      CCx: asthma/COPD evaluation    HPI: Interim history: I last saw Aranza on 2/14. Since that time, she reports she's doing well. No cough or SOB.     ROS:  A 12-system review was obtained and was negative with the exception of the symptoms endorsed in the history of present illness.   She uses Qvar regularly (rinses her mouth) and proair as needed.  She still gets  "occasional episodes where she has to yawn deeply to catch her breath. This has been going on for >2 years. It resolves on its own. No chest pressure.     PMH:  Past Medical History:   Diagnosis Date    Acute blood loss anemia 2/18/2015    Arthritis     Asthma     Asthma-COPD overlap syndrome (H) 3/1/2022    Attention deficit disorder (ADD) in adult 3/28/2020    Chronic GERD 3/28/2020    Former smoker, stopped smoking in distant past     Gallstone pancreatitis 9/9/2021    HTN (hypertension)     Hypercholesteremia     Hypertension     Mild intermittent asthma with exacerbation 1/6/2020    Osteopenia 3/28/2020    Right pulmonary embolus (H) 1/6/2020    EXAM: CTA CHEST PE RUN  LOCATION: [facility]  DATE/TIME: 1/6/2020 1:43 AM   INDICATION: Pleuritic right lower chest pain. Right upper quadrant pain.  COMPARISON: None.  TECHNIQUE: CT angiogram chest during arterial phase injection IV contrast. 2D and 3D MIP reconstructions were performed by the CT technologist. Dose reduction techniques were used.  CONTRAST: Iohexol (Omni) 100 mL   FINDIN       PSH:  Past Surgical History:   Procedure Laterality Date    ANKLE SURGERY Left     BLADDER SURGERY      \"bladder lift\"    CATARACT EXTRACTION Bilateral 2023    CHOLECYSTECTOMY  2020    FOOT SURGERY Left     HYSTERECTOMY  01/01/1998    LAVH    TONSILLECTOMY & ADENOIDECTOMY Bilateral     TOTAL HIP ARTHROPLASTY Left        Allergies:  Allergies   Allergen Reactions    Morphine      Other reaction(s): Hallucinations    Valacyclovir Hives and Unknown    Azithromycin Nausea and Vomiting and Rash     And fever reported.    Mold      Other reaction(s): Runny Nose, Wheezing    Oxycodone Other (See Comments)     Too strong.     Perfume      Other reaction(s): Runny Nose, Wheezing    Tobacco [Tobacco]      Other reaction(s): Runny Nose, Wheezing    \"Smoke\"       Family HX:  Family History   Problem Relation Age of Onset    Crohn's Disease Mother     Hypertension Mother     Kidney " Cancer Father     Breast Cancer Maternal Grandmother 30        bilateral mastectomy done yrs ago.    Colon Cancer Paternal Grandmother     Cancer Brother         Metastatic parotid gland tumor    Coronary Artery Disease Brother     Dementia Brother     Lung Cancer Sister     Atrial fibrillation Son     Cardiomyopathy Son     Coronary Artery Disease Son     No Known Problems Son     No Known Problems Daughter        Social Hx:  Social History     Socioeconomic History    Marital status: Single     Spouse name: Not on file    Number of children: Not on file    Years of education: Not on file    Highest education level: Not on file   Occupational History    Not on file   Tobacco Use    Smoking status: Former     Current packs/day: 0.00     Average packs/day: 1 pack/day for 6.0 years (6.0 ttl pk-yrs)     Types: Cigarettes     Start date: 1969     Quit date: 1975     Years since quittin.6     Passive exposure: Current    Smokeless tobacco: Never   Vaping Use    Vaping status: Never Used   Substance and Sexual Activity    Alcohol use: Yes     Alcohol/week: 0.8 standard drinks of alcohol     Comment: Alcoholic Drinks/day: occ.    Drug use: Not Currently     Types: Marijuana    Sexual activity: Not on file   Other Topics Concern    Not on file   Social History Narrative    3 childen, 2 sons and 1 daughter.        Previous long term patient of Dr. Piper Greene.        Hobbies include playing guitar and other stringed instruments.          Social Determinants of Health     Financial Resource Strain: Low Risk  (2023)    Financial Resource Strain     Within the past 12 months, have you or your family members you live with been unable to get utilities (heat, electricity) when it was really needed?: No   Food Insecurity: Low Risk  (2023)    Food Insecurity     Within the past 12 months, did you worry that your food would run out before you got money to buy more?: No     Within the past 12 months,  did the food you bought just not last and you didn t have money to get more?: No   Transportation Needs: Low Risk  (12/21/2023)    Transportation Needs     Within the past 12 months, has lack of transportation kept you from medical appointments, getting your medicines, non-medical meetings or appointments, work, or from getting things that you need?: No   Physical Activity: Not on file   Stress: Not on file   Social Connections: Unknown (12/30/2021)    [facility], [facility]    Social Connections     Frequency of Communication with Friends and Family: Not on file   Interpersonal Safety: Low Risk  (7/5/2024)    Interpersonal Safety     Do you feel physically and emotionally safe where you currently live?: Yes     Within the past 12 months, have you been hit, slapped, kicked or otherwise physically hurt by someone?: No     Within the past 12 months, have you been humiliated or emotionally abused in other ways by your partner or ex-partner?: No   Housing Stability: Low Risk  (12/21/2023)    Housing Stability     Do you have housing? : Yes     Are you worried about losing your housing?: No       Current Meds:  Current Outpatient Medications   Medication Sig Dispense Refill    acetaminophen (TYLENOL) 500 MG tablet Take 500 mg by mouth every 8 hours as needed for mild pain      amLODIPine (NORVASC) 5 MG tablet TAKE 1 TABLET (5 MG) BY MOUTH EVERY EVENING TAKE WITH THE PRAVASTATIN (PRAVACHOL) 90 tablet 3    calcium carbonate (OS-STERLING) 600 mg calcium (1,500 mg) tablet [CALCIUM CARBONATE (OS-STERLING) 600 MG CALCIUM (1,500 MG) TABLET] Take 600 mg by mouth daily.      CHOLECALCIFEROL PO Take 2,000 Units by mouth daily      diphenhydrAMINE-acetaminophen (TYLENOL PM)  MG tablet Take 1 tablet by mouth nightly as needed for sleep      irbesartan (AVAPRO) 300 MG tablet TAKE 1 TABLET BY MOUTH EVERY DAY 90 tablet 3    multivitamin therapeutic  (THERAGRAN) tablet [MULTIVITAMIN THERAPEUTIC (THERAGRAN) TABLET] Take 1 tablet by mouth every morning.      pravastatin (PRAVACHOL) 40 MG tablet TAKE 1 TABLET BY MOUTH EVERY DAY IN THE MORNING 90 tablet 3    prednisoLONE acetate (PRED FORTE) 1 % ophthalmic suspension       PROAIR RESPICLICK 108 (90 Base) MCG/ACT inhaler INHALE 1 PUFF BY MOUTH EVERY 4 HOURS AS NEEDED 1 each 6    QVAR REDIHALER 80 MCG/ACT inhaler INHALE 1 PUFF BY MOUTH TWICE A DAY 21.2 g 3       Physical Exam:  LMP  (LMP Unknown)   Gen: awake, alert, oriented, no distress  HEENT: nasal turbinates are unremarkable, no oropharyngeal lesions, no cervical or supraclavicular lymphadenopathy  CV: RRR, no M/G/R  Resp: mild bibasilar crackles, no wheezing or rhonchi. Good air movement.   Skin: no apparent rashes  Ext: no cyanosis, clubbing or edema  Neuro: alert, nonfocal    Labs:  Reviewed  Chem panel OK  CBC normal, no diff.  hgb 14.0    Imaging studies:  CTA chest from 2020:  IMPRESSION:  1.  Pulmonary emboli right lower lobe.  2.  Small clot burden.  3.  No evidence of heart strain.  4.  Trace right basilar effusion.  5.  Normal right basilar atelectasis.  6.  No aneurysm or dissection involving the thoracic aorta    Pulmonary Function Testing  4/13/2022  FEV1 1.59L, 78% (> LLN)  FVC 90%  +BD response  Ratio 0.66 (> LLN)  FEF 25-75% 53% with +BD response  %  DLco 54% roseline for hgb.  Flow volume loop suggests small airways disease.

## 2024-08-15 NOTE — LETTER
8/15/2024      Aranza Mcmanus  [address]  [facility] [address]      Dear Colleague,    Thank you for referring your patient, Aranza Mcmanus, to the [facility]. Please see a copy of my visit note below.    Pulmonary Clinic Follow-up Visit    Assessment and Plan:   84 year old lady with a history of asthma/reactive airway disease - evaluated at [facility] many years ago, obesity, HTN, HLD, former minimal smoking history, hx of small PE in 2020 treated with DOAC, presents for asthma/COPD follow up. PFTs showed normal spirometry with borderline reduced FEV1/FVC ratio, positive response to bronchodilator, evidence of small airways disease as well as moderately impaired diffusing capacity for CO. The latter is likely due second hand some exposure and emphysema, which was confirmed on her CT scan from 2020. She probably has some asthma/COPD overlap features given the bronchodilator responsiveness and borderline reduction in FEV1/FVC ratio. She is doing well on current regimen. Lung exam and SpO2 are normal today.     Recommendations:  - continue the Qvar 1-2 puffs bid. Rinse/gargle/spit after use.  - continue albuterol rescue inhaler/neb as needed  - encouraged her to exercise and remain active. Weight loss as able  - UTD with vaccinations. Should get PCV20 in 2025. Should get RSV vaccine; info given - she'll discuss with her PMD.    Follow up in 1 year or sooner if needed.    The longitudinal plan of care for the diagnosis(es)/condition(s) as documented were addressed during this visit. Due to the added complexity in care, I will continue to support Aranza in the subsequent management and with ongoing continuity of care.     Manish Morales MD (Avi)  [facility] Pulmonary & Critical Care  Clinic [phone]  Fax [phone]      CCx: asthma/COPD evaluation    HPI: Interim history: I last saw Aranza on 2/14. Since that time, she reports she's doing  "well. No cough or SOB.     ROS:  A 12-system review was obtained and was negative with the exception of the symptoms endorsed in the history of present illness.   She uses Qvar regularly (rinses her mouth) and proair as needed.  She still gets occasional episodes where she has to yawn deeply to catch her breath. This has been going on for >2 years. It resolves on its own. No chest pressure.     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 Right upper quadrant pain.  COMPARISON: None.  TECHNIQUE: CT angiogram chest during arterial phase injection IV contrast. 2D and 3D MIP reconstructions were performed by the CT technologist. Dose reduction techniques were used.  CONTRAST: Iohexol (Omni) 100 mL   FINDIN       PSH:  Past Surgical History:   Procedure Laterality Date     ANKLE SURGERY Left      BLADDER SURGERY      \"bladder lift\"     CATARACT EXTRACTION Bilateral 2023     CHOLECYSTECTOMY  2020     FOOT SURGERY Left      HYSTERECTOMY  01/01/1998    LAVH     TONSILLECTOMY & ADENOIDECTOMY Bilateral      TOTAL HIP ARTHROPLASTY Left        Allergies:  Allergies   Allergen Reactions     Morphine      Other reaction(s): Hallucinations     Valacyclovir Hives and Unknown     Azithromycin Nausea and Vomiting and Rash     And fever reported.     Mold      Other reaction(s): Runny Nose, Wheezing     Oxycodone Other (See Comments)     Too " "strong.      Perfume      Other reaction(s): Runny Nose, Wheezing     Tobacco [Tobacco]      Other reaction(s): Runny Nose, Wheezing    \"Smoke\"       Family HX:  Family History   Problem Relation Age of Onset     Crohn's Disease Mother      Hypertension Mother      Kidney Cancer Father      Breast Cancer Maternal Grandmother 30        bilateral mastectomy done yrs ago.     Colon Cancer Paternal Grandmother      Cancer Brother         Metastatic parotid gland tumor     Coronary Artery Disease Brother      Dementia Brother      Lung Cancer Sister      Atrial fibrillation Son      Cardiomyopathy Son      Coronary Artery Disease Son      No Known Problems Son      No Known Problems Daughter        Social Hx:  Social History     Socioeconomic History     Marital status: Single     Spouse name: Not on file     Number of children: Not on file     Years of education: Not on file     Highest education level: Not on file   Occupational History     Not on file   Tobacco Use     Smoking status: Former     Current packs/day: 0.00     Average packs/day: 1 pack/day for 6.0 years (6.0 ttl pk-yrs)     Types: Cigarettes     Start date: 1969     Quit date: 1975     Years since quittin.6     Passive exposure: Current     Smokeless tobacco: Never   Vaping Use     Vaping status: Never Used   Substance and Sexual Activity     Alcohol use: Yes     Alcohol/week: 0.8 standard drinks of alcohol     Comment: Alcoholic Drinks/day: occ.     Drug use: Not Currently     Types: Marijuana     Sexual activity: Not on file   Other Topics Concern     Not on file   Social History Narrative    3 childen, 2 sons and 1 daughter.        Previous long term patient of Dr. Piper Greene.        Hobbies include playing guitar and other stringed instruments.          Social Determinants of Health     Financial Resource Strain: Low Risk  (2023)    Financial Resource Strain      Within the past 12 months, have you or your family members you " live with been unable to get utilities (heat, electricity) when it was really needed?: No   Food Insecurity: Low Risk  (12/21/2023)    Food Insecurity      Within the past 12 months, did you worry that your food would run out before you got money to buy more?: No      Within the past 12 months, did the food you bought just not last and you didn t have money to get more?: No   Transportation Needs: Low Risk  (12/21/2023)    Transportation Needs      Within the past 12 months, has lack of transportation kept you from medical appointments, getting your medicines, non-medical meetings or appointments, work, or from getting things that you need?: No   Physical Activity: Not on file   Stress: Not on file   Social Connections: Unknown (12/30/2021)    [facility]    Social Connections      Frequency of Communication with Friends and Family: Not on file   Interpersonal Safety: Low Risk  (7/5/2024)    Interpersonal Safety      Do you feel physically and emotionally safe where you currently live?: Yes      Within the past 12 months, have you been hit, slapped, kicked or otherwise physically hurt by someone?: No      Within the past 12 months, have you been humiliated or emotionally abused in other ways by your partner or ex-partner?: No   Housing Stability: Low Risk  (12/21/2023)    Housing Stability      Do you have housing? : Yes      Are you worried about losing your housing?: No       Current Meds:  Current Outpatient Medications   Medication Sig Dispense Refill     acetaminophen (TYLENOL) 500 MG tablet Take 500 mg by mouth every 8 hours as needed for mild pain       amLODIPine (NORVASC) 5 MG tablet TAKE 1 TABLET (5 MG) BY MOUTH EVERY EVENING TAKE WITH THE PRAVASTATIN (PRAVACHOL) 90 tablet 3     calcium carbonate (OS-STERLING) 600 mg calcium (1,500 mg) tablet [CALCIUM CARBONATE (OS-STERLING) 600 MG CALCIUM (1,500 MG) TABLET] Take 600 mg by  mouth daily.       CHOLECALCIFEROL PO Take 2,000 Units by mouth daily       diphenhydrAMINE-acetaminophen (TYLENOL PM)  MG tablet Take 1 tablet by mouth nightly as needed for sleep       irbesartan (AVAPRO) 300 MG tablet TAKE 1 TABLET BY MOUTH EVERY DAY 90 tablet 3     multivitamin therapeutic (THERAGRAN) tablet [MULTIVITAMIN THERAPEUTIC (THERAGRAN) TABLET] Take 1 tablet by mouth every morning.       pravastatin (PRAVACHOL) 40 MG tablet TAKE 1 TABLET BY MOUTH EVERY DAY IN THE MORNING 90 tablet 3     prednisoLONE acetate (PRED FORTE) 1 % ophthalmic suspension        PROAIR RESPICLICK 108 (90 Base) MCG/ACT inhaler INHALE 1 PUFF BY MOUTH EVERY 4 HOURS AS NEEDED 1 each 6     QVAR REDIHALER 80 MCG/ACT inhaler INHALE 1 PUFF BY MOUTH TWICE A DAY 21.2 g 3       Physical Exam:  LMP  (LMP Unknown)   Gen: awake, alert, oriented, no distress  HEENT: nasal turbinates are unremarkable, no oropharyngeal lesions, no cervical or supraclavicular lymphadenopathy  CV: RRR, no M/G/R  Resp: mild bibasilar crackles, no wheezing or rhonchi. Good air movement.   Skin: no apparent rashes  Ext: no cyanosis, clubbing or edema  Neuro: alert, nonfocal    Labs:  Reviewed  Chem panel OK  CBC normal, no diff.  hgb 14.0    Imaging studies:  CTA chest from 2020:  IMPRESSION:  1.  Pulmonary emboli right lower lobe.  2.  Small clot burden.  3.  No evidence of heart strain.  4.  Trace right basilar effusion.  5.  Normal right basilar atelectasis.  6.  No aneurysm or dissection involving the thoracic aorta    Pulmonary Function Testing  4/13/2022  FEV1 1.59L, 78% (> LLN)  FVC 90%  +BD response  Ratio 0.66 (> LLN)  FEF 25-75% 53% with +BD response  %  DLco 54% roseline for hgb.  Flow volume loop suggests small airways disease.     Again, thank you for allowing me to participate in the care of your patient.        Sincerely,        Manish Morales MD

## 2024-08-30 ENCOUNTER — TELEPHONE (OUTPATIENT)
Dept: INTERNAL MEDICINE | Facility: CLINIC | Age: 84
End: 2024-08-30
Payer: COMMERCIAL

## 2024-08-30 NOTE — TELEPHONE ENCOUNTER
FYI - Status Update    Who is Calling: patient    Update: patient was seen by pulmonologist this month & they suggested that she get the RSV Vaccine.  Would like Dr Okeefe's opinion on this also.     Plans to get the flu & covid vaccines in Oct so would like to know if she should get the RSV vaccine at that time as well.     Does caller want a call/response back: Yes     Could we send this information to you in [email or web address] or would you prefer to receive a phone call?:   Patient would prefer a phone call   Okay to leave a detailed message?: Yes at Cell number on file:    Telephone Information:   Mobile [phone]

## 2024-08-30 NOTE — TELEPHONE ENCOUNTER
I would not do the RSV vaccine at the same time as the others.    I do not strongly advocate doing the RSV vaccine, but the pulmonary doctor is recommending it because of her lung disease.  RSV may behave worse in her.  It can sometimes give people bronchitis which lasts 1-2 weeks, sometimes worse.    She could get the RSV vaccine 2-4 weeks after the others, but should do it in the fall if she is going to do it.    Agustin Okeefe MD  General Internal Medicine  [facility]  8/30/2024, 1:40 PM

## 2024-08-30 NOTE — TELEPHONE ENCOUNTER
Pt called back.  Read her message below.  Pt is in good understanding.    Noemy Coe on 8/30/2024 at 2:39 PM

## 2024-10-02 ENCOUNTER — OFFICE VISIT (OUTPATIENT)
Dept: NEUROLOGY | Facility: CLINIC | Age: 84
End: 2024-10-02
Attending: INTERNAL MEDICINE
Payer: COMMERCIAL

## 2024-10-02 DIAGNOSIS — R20.0 LEFT LEG NUMBNESS: Primary | ICD-10-CM

## 2024-10-02 PROCEDURE — 95909 NRV CNDJ TST 5-6 STUDIES: CPT | Performed by: PSYCHIATRY & NEUROLOGY

## 2024-10-02 PROCEDURE — 95886 MUSC TEST DONE W/N TEST COMP: CPT | Performed by: PSYCHIATRY & NEUROLOGY

## 2024-10-02 PROCEDURE — 99207 PR NO CHARGE NURSE ONLY: CPT | Performed by: PSYCHIATRY & NEUROLOGY

## 2024-10-02 NOTE — LETTER
10/2/2024      Aranza Mcmanus  [address]  [place] [address]      Dear Colleague,    Thank you for referring your patient, Aranza Mcmanus, to the [facility]. Please see a copy of my visit note below.    See EMG report      Again, thank you for allowing me to participate in the care of your patient.        Sincerely,        tobi Lundberg MD

## 2024-10-02 NOTE — LETTER
October 14, 2024      Aranza Mcmanus  [address]   [place] [address]        Dear ,    We are writing to inform you of your test results.    No signs of nerve damage or peripheral neuropathy (PN).  Good news overall.     Let me know if concerns.    Resulted Orders   EMG    Narrative    Emmanuel Lundberg MD     10/2/2024 10:48 AM      [facility], [address]  [place] [address]  Tel: [phone]  Fax: [phone]          Full Name: Aranza Mcmanus Gender: Female  Patient ID: [id number] YOB: 1940      Visit Date: 10/2/2024 09:34  Age: 84 Years 4 Months Old  Interpreted By: Emmanuel Lundberg MD   Ref Dr.: Agustin Okeefe MD  Tech: ST   Height: 5 feet 5 inch  Reason for referral: Evaluate left lower. c/o tingling, buzzing   sensations in left leg/foot > 2 years.  h/o left foot fracture,       Motor NCS      Nerve / Sites Lat Amp Dist Jake    ms mV cm m/s   L Peroneal - EDB      Ankle 6.82 1.5 8       Fib head 13.18 1.5 32 50      Pop fossa 15.47 1.5 11 48   L Tibial - AH      Ankle 4.79 12.2 8       Pop fossa 12.50 12.1 41 53       F  Wave      Nerve Fmin    ms   L Tibial - AH 48.39       Sensory NCS      Nerve / Sites Pk Lat NP Amp Dist    ms  V cm   L Sural - Ankle (Calf)      Calf 3.02 7.3 14   L Superficial peroneal - Ankle      Lat leg 4.38 5.0 12       EMG Summary Table     Spontaneous MUAP Rcmt Note   Muscle Fib PSW Fasc IA # Amp Dur PPP Rate Type   L. Gluteus medius None None None N N N N N N N   L. Gluteus jenny None None None N N N N N N N   L. L3 paraspinal None None None N N N N N N N   L. L4 paraspinal None None None N N N N N N N   L. L5 paraspinal None None None N N N N N N N   L. S1 paraspinal None None None N N N N N N N   L. Adductor jt None None None N N N N N N N   L. Quadriceps None None None N N N N N N N   L. Tibialis anterior None None None N N N N N N N    L. Peroneus longus None None None N N N N N N N   L. Gastrocnemius (Medial head) None None None N N N N N N N   L. Gastrocnemius (Lateral head) None None None N N N N N N N   L. Tibialis posterior None None None N N N N N N N       Left peroneal motor conduction study normal  Left tibial motor conduction study normal  Left tibial F wave latency normal    Left sural snap potential normal  Left superficial peroneal snap potential normal    Needle examination left lower extremity no active or chronic   denervation, normal    Impression  Normal EMG left lower extremity         If you have any questions or concerns, please call the clinic at the number listed above.       Sincerely,      Agustin Okeefe MD

## 2024-10-02 NOTE — PROCEDURES
[facility]    Formerly [facility]  [address], [address]  [place] [address]  Tel: [phone]  Fax: [phone]          Full Name: Aranza Mcmanus Gender: Female  Patient ID: [id number] YOB: 1940      Visit Date: 10/2/2024 09:34  Age: 84 Years 4 Months Old  Interpreted By: Emmanuel Lundberg MD   Ref Dr.: Agustin Okeefe MD  Tech: ST   Height: 5 feet 5 inch  Reason for referral: Evaluate left lower. c/o tingling, buzzing sensations in left leg/foot > 2 years.  h/o left foot fracture,       Motor NCS      Nerve / Sites Lat Amp Dist Jake    ms mV cm m/s   L Peroneal - EDB      Ankle 6.82 1.5 8       Fib head 13.18 1.5 32 50      Pop fossa 15.47 1.5 11 48   L Tibial - AH      Ankle 4.79 12.2 8       Pop fossa 12.50 12.1 41 53       F  Wave      Nerve Fmin    ms   L Tibial - AH 48.39       Sensory NCS      Nerve / Sites Pk Lat NP Amp Dist    ms  V cm   L Sural - Ankle (Calf)      Calf 3.02 7.3 14   L Superficial peroneal - Ankle      Lat leg 4.38 5.0 12       EMG Summary Table     Spontaneous MUAP Rcmt Note   Muscle Fib PSW Fasc IA # Amp Dur PPP Rate Type   L. Gluteus medius None None None N N N N N N N   L. Gluteus jenny None None None N N N N N N N   L. L3 paraspinal None None None N N N N N N N   L. L4 paraspinal None None None N N N N N N N   L. L5 paraspinal None None None N N N N N N N   L. S1 paraspinal None None None N N N N N N N   L. Adductor jt None None None N N N N N N N   L. Quadriceps None None None N N N N N N N   L. Tibialis anterior None None None N N N N N N N   L. Peroneus longus None None None N N N N N N N   L. Gastrocnemius (Medial head) None None None N N N N N N N   L. Gastrocnemius (Lateral head) None None None N N N N N N N   L. Tibialis posterior None None None N N N N N N N       Left peroneal motor conduction study normal  Left tibial motor conduction study normal  Left tibial F wave latency normal    Left  Radha Cano is a 66 y.o. male who was seen by synchronous (real-time) audio-video technology on 12/17/2021 for Sinus Infection (no fever, congestion runny nose)        Assessment & Plan:   Diagnoses and all orders for this visit:    1. Maxillary sinusitis, unspecified chronicity  -     doxycycline (ADOXA) 100 mg tablet; Take 1 Tablet by mouth two (2) times a day for 7 days. 2. Degenerative disc disease, lumbar  -     cyclobenzaprine (FLEXERIL) 10 mg tablet; Take 1 Tablet by mouth three (3) times daily as needed for Muscle Spasm(s). 3. S/P spinal fusion  -     cyclobenzaprine (FLEXERIL) 10 mg tablet; Take 1 Tablet by mouth three (3) times daily as needed for Muscle Spasm(s). 4. Lumbar herniated disc  -     cyclobenzaprine (FLEXERIL) 10 mg tablet; Take 1 Tablet by mouth three (3) times daily as needed for Muscle Spasm(s). 5. Postlaminectomy syndrome of lumbar region  -     cyclobenzaprine (FLEXERIL) 10 mg tablet; Take 1 Tablet by mouth three (3) times daily as needed for Muscle Spasm(s). 6. Type 2 diabetes mellitus without complication, without long-term current use of insulin (HCC)  -     HEMOGLOBIN A1C WITH EAG; Future    7. Screening for endocrine, metabolic and immunity disorder  -     CBC W/O DIFF; Future  -     METABOLIC PANEL, COMPREHENSIVE; Future  -     LIPID PANEL; Future    8. Screening for cardiovascular condition  -     LIPID PANEL; Future        I spent at least 30 minutes on this visit with this established patient. 712  Subjective:       Sinus Infection  Patient using symbicort and use albuterol as needed for his asthma. Patient states she have been having sinus concerns for almost 2 weeks. He states he is congested in his face and having yellow phlegm when coughing. He state he have tenderness around his nose and eyes. Advised patient to take use a antihistamine, humidifier at night and increase his water intake.    Patient having been using OTC Robitussin and it have not helped    GERD w/esophagitis  Patient is currently taking Famotidine. He is doing well on this medication and denies any side effects. Takes as prescribe. Lumbar Postlaminectomy Syndrome/Chronic pain syndrome  He takes gabapentin 800 mg TID that is managed by Dr. Vish Burger. Patient also is on flexeril 10 mg TID PRN. He states he takes the flexeril anywhere from 2 to 3 times daily. He have been using the flexeril and gabapentin to manage his chronic back pain. Labs ordered for patient    Past Medical History:   Diagnosis Date    Arthritis of knee, right 12/2017    advanced degen changes noted, CT right LE    Asthma     Cellulitis 12/2017    DDD (degenerative disc disease), lumbar 2013    noted on MRI with annular tears    Diverticulitis 2016    GERD (gastroesophageal reflux disease) 2016    with esophagitis according to endscopy    Kidney stone     Lumbar post-laminectomy syndrome     Sacroiliitis ([address]     Stroke [facility]) may 14 2010    Unspecified rotator cuff tear or rupture of left shoulder, not specified as traumatic 03/2016        Prior to Admission medications    Medication Sig Start Date End Date Taking? Authorizing Provider   gabapentin (NEURONTIN) 800 mg tablet TAKE 1 TABLET BY MOUTH THREE TIMES DAILY. MAX 3 TABS DAILY 6/16/21  Yes Burton Ballard MD   cyclobenzaprine (FLEXERIL) 10 mg tablet Take 1 Tablet by mouth three (3) times daily as needed for Muscle Spasm(s). 5/25/21  Yes Shanon Burris NP   albuterol (PROVENTIL HFA, VENTOLIN HFA, PROAIR HFA) 90 mcg/actuation inhaler Take 2 Puffs by inhalation every six (6) hours as needed for Wheezing or Shortness of Breath. 5/25/21  Yes Shanon Burris NP   budesonide-formoteroL (Symbicort) 160-4.5 mcg/actuation HFAA Take 1 Puff by inhalation two (2) times a day. 5/25/21  Yes Shanon Burris NP   loratadine (Claritin) 10 mg tablet Take 1 Tab by mouth daily.  3/24/20  Yes Melissa Mejia NP   famotidine (PEPCID) sural snap potential normal  Left superficial peroneal snap potential normal    Needle examination left lower extremity no active or chronic denervation, normal    Impression  Normal EMG left lower extremity     40 mg tablet Take 1 Tab by mouth two (2) times daily as needed (indigestion/acid reflux). 2/13/20  Yes Danielle Vance NP   aspirin 81 mg chewable tablet Take 81 mg by mouth daily. Yes Provider, Historical   Lactobacillus Acidoph & Bulgar (FLORANEX) 1 million cell tab tablet Take 2 Tablets by mouth two (2) times a day. Patient not taking: Reported on 12/17/2021 6/14/21 12/17/21  Kilo Rees MD   albuterol (PROVENTIL VENTOLIN) 2.5 mg /3 mL (0.083 %) nebulizer solution 3 mL by Nebulization route once for 1 dose. 1/13/15 2/8/20  Bhavana Sanders NP     Patient Active Problem List   Diagnosis Code    Degenerative disc disease, lumbar M51.36    S/P spinal fusion Z98.1    History of CVA (cerebrovascular accident) Z86.73    Lumbar herniated disc M51.26    Gastroesophageal reflux disease with esophagitis K21.00    Moderate persistent asthma without complication R95.38    Postlaminectomy syndrome of lumbar region M96.1    Other intervertebral disc degeneration, lumbar region M51.36    Cellulitis L03.90    Primary osteoarthritis of right knee M17.11    HNP (herniated nucleus pulposus), lumbar M51.26    Lumbar radiculopathy M54.16    Infected puncture wound of hand S61.439A, L08.9    Suppurative tenosynovitis of flexor tendon of left hand M65.142    Hypokalemia E87.6    Bradycardia R00.1    Cellulitis of finger of left hand L03.012     Current Outpatient Medications   Medication Sig Dispense Refill    cyclobenzaprine (FLEXERIL) 10 mg tablet Take 1 Tablet by mouth three (3) times daily as needed for Muscle Spasm(s). 90 Tablet 2    doxycycline (ADOXA) 100 mg tablet Take 1 Tablet by mouth two (2) times a day for 7 days. 14 Tablet 0    gabapentin (NEURONTIN) 800 mg tablet TAKE 1 TABLET BY MOUTH THREE TIMES DAILY.  MAX 3 TABS DAILY 270 Tablet 0    albuterol (PROVENTIL HFA, VENTOLIN HFA, PROAIR HFA) 90 mcg/actuation inhaler Take 2 Puffs by inhalation every six (6) hours as needed for Wheezing or Shortness of Breath. 1 Inhaler 11    budesonide-formoteroL (Symbicort) 160-4.5 mcg/actuation HFAA Take 1 Puff by inhalation two (2) times a day. 1 Inhaler 6    loratadine (Claritin) 10 mg tablet Take 1 Tab by mouth daily. 90 Tab 3    famotidine (PEPCID) 40 mg tablet Take 1 Tab by mouth two (2) times daily as needed (indigestion/acid reflux). 90 Tab 0    aspirin 81 mg chewable tablet Take 81 mg by mouth daily.  albuterol (PROVENTIL VENTOLIN) 2.5 mg /3 mL (0.083 %) nebulizer solution 3 mL by Nebulization route once for 1 dose. 1 Package 0     Past Medical History:   Diagnosis Date    Arthritis of knee, right 12/2017    advanced degen changes noted, CT right LE    Asthma     Cellulitis 12/2017    DDD (degenerative disc disease), lumbar 2013    noted on MRI with annular tears    Diverticulitis 2016    GERD (gastroesophageal reflux disease) 2016    with esophagitis according to endscopy    Kidney stone     Lumbar post-laminectomy syndrome     [address]ili[address] ([facility] [address]     Stroke [facility]) may 14 2010    Unspecified rotator cuff tear or rupture of left shoulder, not specified as traumatic 03/2016     Past Surgical History:   Procedure Laterality Date    COLONOSCOPY N/A 8/12/2016    COLONOSCOPY with polypectomy performed by Cesia Baeza MD at [address]  2002, 2004, 2006    L5-S1 fusion, laminectomies    HX ENDOSCOPY  10/2016    grade 1 espohagitis    HX ENDOSCOPY  11/2016    mild esophagitis    HX KNEE ARTHROSCOPY Right     knee    HX MOHS PROCEDURES      right    HX ORTHOPAEDIC      right shoulder       Review of Systems   Constitutional: Negative for chills, fever and malaise/fatigue. HENT: Positive for congestion and sinus pain. Negative for ear discharge, ear pain and sore throat. Eyes: Negative. Respiratory: Negative for cough, shortness of breath and wheezing. Cardiovascular: Negative for chest pain, palpitations and leg swelling. Musculoskeletal: Negative.     Skin: Negative. Neurological: Negative. Objective:     Patient-Reported Vitals 12/17/2021   Patient-Reported Weight 176      General: alert, cooperative, no distress   Mental  status: normal mood, behavior, speech, dress, motor activity, and thought processes, able to follow commands   HENT: NCAT   Neck: no visualized mass   Resp: no respiratory distress   Neuro: no gross deficits   Skin: no discoloration or lesions of concern on visible areas   Psychiatric: normal affect, consistent with stated mood, no evidence of hallucinations     Additional exam findings: We discussed the expected course, resolution and complications of the diagnosis(es) in detail. Medication risks, benefits, costs, interactions, and alternatives were discussed as indicated. I advised him to contact the office if his condition worsens, changes or fails to improve as anticipated. He expressed understanding with the diagnosis(es) and plan. Chely Horn, who was evaluated through a patient-initiated, synchronous (real-time) audio-video encounter, and/or his healthcare decision maker, is aware that it is a billable service, with coverage as determined by his insurance carrier. He provided verbal consent to proceed: Yes, and patient identification was verified. It was conducted pursuant to the emergency declaration under the [facility], [address] [place] [address] authority and the [employer]. A caregiver was present when appropriate. Ability to conduct physical exam was limited. I was in the office. The patient was at home.       Baylee Ngo NP

## 2024-10-15 ENCOUNTER — OFFICE VISIT (OUTPATIENT)
Dept: OBGYN | Facility: CLINIC | Age: 84
End: 2024-10-15
Payer: COMMERCIAL

## 2024-10-15 VITALS
BODY MASS INDEX: 33.64 KG/M2 | WEIGHT: 201.9 LBS | SYSTOLIC BLOOD PRESSURE: 145 MMHG | TEMPERATURE: 97.1 F | DIASTOLIC BLOOD PRESSURE: 84 MMHG | HEIGHT: 65 IN | HEART RATE: 73 BPM | RESPIRATION RATE: 18 BRPM

## 2024-10-15 DIAGNOSIS — N30.00 ACUTE CYSTITIS WITHOUT HEMATURIA: ICD-10-CM

## 2024-10-15 DIAGNOSIS — R35.0 URINARY FREQUENCY: Primary | ICD-10-CM

## 2024-10-15 LAB
ALBUMIN UR-MCNC: NEGATIVE MG/DL
APPEARANCE UR: ABNORMAL
BACTERIA #/AREA URNS HPF: ABNORMAL /HPF
BILIRUB UR QL STRIP: NEGATIVE
COLOR UR AUTO: YELLOW
GLUCOSE UR STRIP-MCNC: NEGATIVE MG/DL
HGB UR QL STRIP: ABNORMAL
KETONES UR STRIP-MCNC: NEGATIVE MG/DL
LEUKOCYTE ESTERASE UR QL STRIP: ABNORMAL
NITRATE UR QL: POSITIVE
PH UR STRIP: 6 [PH] (ref 5–7)
RBC #/AREA URNS AUTO: ABNORMAL /HPF
SP GR UR STRIP: >=1.03 (ref 1–1.03)
SQUAMOUS #/AREA URNS AUTO: ABNORMAL /LPF
UROBILINOGEN UR STRIP-ACNC: 0.2 E.U./DL
WBC #/AREA URNS AUTO: ABNORMAL /HPF

## 2024-10-15 PROCEDURE — 87186 SC STD MICRODIL/AGAR DIL: CPT | Performed by: OBSTETRICS & GYNECOLOGY

## 2024-10-15 PROCEDURE — 81001 URINALYSIS AUTO W/SCOPE: CPT | Performed by: OBSTETRICS & GYNECOLOGY

## 2024-10-15 PROCEDURE — 99213 OFFICE O/P EST LOW 20 MIN: CPT | Performed by: OBSTETRICS & GYNECOLOGY

## 2024-10-15 PROCEDURE — 87086 URINE CULTURE/COLONY COUNT: CPT | Performed by: OBSTETRICS & GYNECOLOGY

## 2024-10-15 RX ORDER — SULFAMETHOXAZOLE AND TRIMETHOPRIM 800; 160 MG/1; MG/1
1 TABLET ORAL 2 TIMES DAILY
Qty: 10 TABLET | Refills: 0 | Status: SHIPPED | OUTPATIENT
Start: 2024-10-15 | End: 2024-10-20

## 2024-10-15 NOTE — PROGRESS NOTES
Chief Complaint   Patient presents with    Consult     Bladder concerns possible prolapse, increased frequency x 2-3 months, hx partial hyst.          HPI:  Aranza Mcmanus, 84 year old, presents with complaints of urinary issues.  She states that she states her bladder has dropped.  She complains that she has been urinating more frequently especially at night.  She states that many years ago she had a partial hysterectomy.  The last doctor said that she had a stitch come out and she was supposed to go back but she did not.  She also thinks she had a bladder lift many years ago.  She states that she was out for quite a while and woke up a couple of days after surgery.  She states that doctor is not her practice and nobody really told her what happened.  Currently she is complaining of increased frequency especially at.  She gets a chill that comes up through her entire body when she is urinating.  She denies fevers or chills but this pain that she gets with urination comes up through the entire body.  She denies anything hanging out.  She just feels a little heavier in the pelvic area.  They have been moving for the last couple months and she has been doing a lot of lifting and pulling and is concerned about possible prolapse.      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 Right upper quadrant pain.  COMPARISON: None.  TECHNIQUE: CT  "angiogram chest during arterial phase injection IV contrast. 2D and 3D MIP reconstructions were performed by the CT technologist. Dose reduction techniques were used.  CONTRAST: Iohexol (Omni) 100 mL   FINDIN     Past Surgical History:   Procedure Laterality Date    ANKLE SURGERY Left     BLADDER SURGERY      \"bladder lift\"    CATARACT EXTRACTION Bilateral     CHOLECYSTECTOMY      FOOT SURGERY Left     HYSTERECTOMY  1998    LAVH    TONSILLECTOMY & ADENOIDECTOMY Bilateral     TOTAL HIP ARTHROPLASTY Left      Social History     Socioeconomic History    Marital status: Single     Spouse name: Not on file    Number of children: Not on file    Years of education: Not on file    Highest education level: Not on file   Occupational History    Not on file   Tobacco Use    Smoking status: Former     Current packs/day: 0.00     Average packs/day: 1 pack/day for 6.0 years (6.0 ttl pk-yrs)     Types: Cigarettes     Start date: 1969     Quit date: 1975     Years since quittin.8     Passive exposure: Current    Smokeless tobacco: Never   Vaping Use    Vaping status: Never Used   Substance and Sexual Activity    Alcohol use: Yes     Alcohol/week: 0.8 standard drinks of alcohol     Comment: Alcoholic Drinks/day: occ.    Drug use: Not Currently     Types: Marijuana    Sexual activity: Not Currently     Birth control/protection: Female Surgical   Other Topics Concern    Not on file   Social History Narrative    3 childen, 2 sons and 1 daughter.        Previous long term patient of Dr. Piper Greene.        Hobbies include playing guitar and other stringed instruments.          Social Determinants of Health     Financial Resource Strain: Low Risk  (2023)    Financial Resource Strain     Within the past 12 months, have you or your family members you live with been unable to get utilities (heat, electricity) when it was really needed?: No   Food Insecurity: Low Risk  (2023)    Food Insecurity "     Within the past 12 months, did you worry that your food would run out before you got money to buy more?: No     Within the past 12 months, did the food you bought just not last and you didn t have money to get more?: No   Transportation Needs: Low Risk  (12/21/2023)    Transportation Needs     Within the past 12 months, has lack of transportation kept you from medical appointments, getting your medicines, non-medical meetings or appointments, work, or from getting things that you need?: No   Physical Activity: Not on file   Stress: Not on file   Social Connections: Unknown (12/30/2021)    [facility] [employer] [facility]    Social Connections     Frequency of Communication with Friends and Family: Not on file   Interpersonal Safety: Low Risk  (7/5/2024)    Interpersonal Safety     Do you feel physically and emotionally safe where you currently live?: Yes     Within the past 12 months, have you been hit, slapped, kicked or otherwise physically hurt by someone?: No     Within the past 12 months, have you been humiliated or emotionally abused in other ways by your partner or ex-partner?: No   Housing Stability: Low Risk  (12/21/2023)    Housing Stability     Do you have housing? : Yes     Are you worried about losing your housing?: No     Family History   Problem Relation Age of Onset    Crohn's Disease Mother     Hypertension Mother     Kidney Cancer Father     Breast Cancer Maternal Grandmother 30        bilateral mastectomy done yrs ago.    Colon Cancer Paternal Grandmother     Cancer Brother         Metastatic parotid gland tumor    Coronary Artery Disease Brother     Dementia Brother     Lung Cancer Sister     Atrial fibrillation Son     Cardiomyopathy Son     Coronary Artery Disease Son     No Known Problems Son     No Known Problems Daughter         Current Outpatient Medications   Medication Sig Dispense Refill    CHOLECALCIFEROL PO  "Take 2,000 Units by mouth daily      irbesartan (AVAPRO) 300 MG tablet TAKE 1 TABLET BY MOUTH EVERY DAY 90 tablet 3    multivitamin therapeutic (THERAGRAN) tablet [MULTIVITAMIN THERAPEUTIC (THERAGRAN) TABLET] Take 1 tablet by mouth every morning.      QVAR REDIHALER 80 MCG/ACT inhaler INHALE 1 PUFF BY MOUTH TWICE A DAY 21.2 g 3    sulfamethoxazole-trimethoprim (BACTRIM DS) 800-160 MG tablet Take 1 tablet by mouth 2 times daily for 5 days. 10 tablet 0    acetaminophen (TYLENOL) 500 MG tablet Take 500 mg by mouth every 8 hours as needed for mild pain (Patient not taking: Reported on 10/15/2024)      amLODIPine (NORVASC) 5 MG tablet TAKE 1 TABLET (5 MG) BY MOUTH EVERY EVENING TAKE WITH THE PRAVASTATIN (PRAVACHOL) (Patient not taking: Reported on 10/15/2024) 90 tablet 3    calcium carbonate (OS-STERLING) 600 mg calcium (1,500 mg) tablet [CALCIUM CARBONATE (OS-STERLING) 600 MG CALCIUM (1,500 MG) TABLET] Take 600 mg by mouth daily. (Patient not taking: Reported on 10/15/2024)      diphenhydrAMINE-acetaminophen (TYLENOL PM)  MG tablet Take 1 tablet by mouth nightly as needed for sleep (Patient not taking: Reported on 10/15/2024)      pravastatin (PRAVACHOL) 40 MG tablet TAKE 1 TABLET BY MOUTH EVERY DAY IN THE MORNING (Patient not taking: Reported on 10/15/2024) 90 tablet 3    PROAIR RESPICLICK 108 (90 Base) MCG/ACT inhaler INHALE 1 PUFF BY MOUTH EVERY 4 HOURS AS NEEDED (Patient not taking: Reported on 10/15/2024) 1 each 6        Allergies:     Allergies   Allergen Reactions    Morphine      Other reaction(s): Hallucinations    Valacyclovir Hives and Unknown    Azithromycin Nausea and Vomiting and Rash     And fever reported.    Mold      Other reaction(s): Runny Nose, Wheezing    Oxycodone Other (See Comments)     Too strong.     Perfume      Other reaction(s): Runny Nose, Wheezing    Tobacco [Tobacco]      Other reaction(s): Runny Nose, Wheezing    \"Smoke\"        ROS:  See HPI      Physical Exam:  BP (!) 145/84 (BP Location: " "Right arm, Patient Position: Chair, Cuff Size: Adult Regular)   Pulse 73   Temp 97.1  F (36.2  C) (Tympanic)   Resp 18   Ht 1.651 m (5' 5\")   Wt 91.6 kg (201 lb 14.4 oz)   LMP  (LMP Unknown)   BMI 33.60 kg/m       Appearance: well developed, well nourished, no acute distress  Head Exam normocephalic, no lesions or deformities  Extremities: no edema  Psych: orientated x3      Assessment/Plan:  Encounter Diagnoses   Name Primary?    Urinary frequency Yes    Acute cystitis without hematuria        Exam for prolapse deferred today due to UTI.  Will treat UTI.  Advised to return if symptoms do not improve.  I suspect her symptoms are mostly due to the UTI, not necessarily prolapse.            Eliza Maria MD on 10/15/2024 at 11:59 AM              "

## 2024-10-15 NOTE — NURSING NOTE
"Initial BP (!) 145/84 (BP Location: Right arm, Patient Position: Chair, Cuff Size: Adult Regular)   Pulse 73   Temp 97.1  F (36.2  C) (Tympanic)   Resp 18   Ht 1.651 m (5' 5\")   Wt 91.6 kg (201 lb 14.4 oz)   LMP  (LMP Unknown)   BMI 33.60 kg/m   Estimated body mass index is 33.6 kg/m  as calculated from the following:    Height as of this encounter: 1.651 m (5' 5\").    Weight as of this encounter: 91.6 kg (201 lb 14.4 oz). .    Janna Gupta, GABRIEL    "

## 2024-10-18 LAB
BACTERIA UR CULT: ABNORMAL
BACTERIA UR CULT: ABNORMAL

## 2024-10-20 DIAGNOSIS — I10 PRIMARY HYPERTENSION: Chronic | ICD-10-CM

## 2024-10-21 RX ORDER — AMLODIPINE BESYLATE 5 MG/1
5 TABLET ORAL EVERY EVENING
Qty: 90 TABLET | Refills: 3 | OUTPATIENT
Start: 2024-10-21 | End: 2025-10-21

## 2024-10-23 NOTE — TELEPHONE ENCOUNTER
Reason for Call:  Medication refill:    Do you use a [facility]? [place] of the pharmacy and phone number for the current request:      [facility]Y #[id number] - [place] [phone]       Name of the medication requested: amLODIPine (NORVASC) 5 MG tablet     Other request: [facility] called patient today with recommendation to patient to call clinic. Patient states she is almost out of her amlodipine she has 11 tablets left and no refills.    Asked patient how she is taking medication she takes 1 tablet at night.      Can we leave a detailed message on this number? YES    Phone number patient can be reached at: Home number on file [phone] (home)    Best Time: any    Call taken on 10/23/2024 at 9:49 AM by Nelda Greco

## 2024-10-24 RX ORDER — AMLODIPINE BESYLATE 5 MG/1
5 TABLET ORAL EVERY EVENING
Qty: 90 TABLET | Refills: 3 | Status: SHIPPED | OUTPATIENT
Start: 2024-10-24

## 2024-12-27 ENCOUNTER — TELEPHONE (OUTPATIENT)
Dept: INTERNAL MEDICINE | Facility: CLINIC | Age: 84
End: 2024-12-27

## 2024-12-27 PROBLEM — Z86.711 HISTORY OF PULMONARY EMBOLISM: Status: ACTIVE | Noted: 2020-01-06

## 2024-12-28 NOTE — TELEPHONE ENCOUNTER
Please call patient -    ______________________________________________________________________     Home phone:  [phone] (home)     Cell phone:   Telephone Information:   Mobile [phone]       Other contacts:  Name Home Phone Work Phone Mobile Phone Relationship Lgl GrWHITNEY Chance [phone]   Son      ______________________________________________________________________     Your blood counts are normal and you are not anemic.     C-reactive protein (CRP) marker of inflammation minimally elevated at this time.    Kidney function mildly elevated, but not concerning.  Avoid non-steroidal anti-inflammatories (NSAIDs).    Cholesterol is doing well.    Your liver tests are normal.  Your electrolytes were normal.     Please see how her abdomen discomfort is doing.  If issues are worsening, we can consider a CT scan to look at this further.    Agustin Okeefe MD  [facility]  12/27/2024, 10:05 PM     ______________________________________________________________________    Recent Results (from the past 240 hours)   UA Macroscopic with reflex to Microscopic and Culture    Collection Time: 12/27/24 12:39 PM    Specimen: Urine, Clean Catch   Result Value Ref Range    Color Urine Yellow Colorless, Straw, Light Yellow, Yellow    Appearance Urine Clear Clear    Glucose Urine Negative Negative mg/dL    Bilirubin Urine Negative Negative    Ketones Urine Negative Negative mg/dL    Specific Gravity Urine 1.025 1.005 - 1.030    Blood Urine Negative Negative    pH Urine 5.5 5.0 - 8.0    Protein Albumin Urine Negative Negative mg/dL    Urobilinogen Urine 0.2 0.2, 1.0 E.U./dL    Nitrite Urine Negative Negative    Leukocyte Esterase Urine Negative Negative   Albumin Random Urine Quantitative with Creat Ratio    Collection Time: 12/27/24 12:39 PM   Result Value Ref Range    Creatinine Urine mg/dL 207.0 mg/dL    Albumin Urine mg/L 23.9 mg/L    Albumin Urine mg/g Cr 11.55 0.00 - 25.00 mg/g Cr    Lipid panel reflex to direct LDL Fasting    Collection Time: 12/27/24  1:22 PM   Result Value Ref Range    Cholesterol 194 <200 mg/dL    Triglycerides 170 (H) <150 mg/dL    Direct Measure HDL 50 >=50 mg/dL    LDL Cholesterol Calculated 110 (H) <100 mg/dL    Non HDL Cholesterol 144 (H) <130 mg/dL    Patient Fasting > 8hrs? Unknown    Comprehensive metabolic panel    Collection Time: 12/27/24  1:22 PM   Result Value Ref Range    Sodium 140 135 - 145 mmol/L    Potassium 4.5 3.4 - 5.3 mmol/L    Carbon Dioxide (CO2) 24 22 - 29 mmol/L    Anion Gap 11 7 - 15 mmol/L    Urea Nitrogen 18.9 8.0 - 23.0 mg/dL    Creatinine 1.05 (H) 0.51 - 0.95 mg/dL    GFR Estimate 53 (L) >60 mL/min/1.73m2    Calcium 9.8 8.8 - 10.4 mg/dL    Chloride 105 98 - 107 mmol/L    Glucose 103 (H) 70 - 99 mg/dL    Alkaline Phosphatase 85 40 - 150 U/L    AST 26 0 - 45 U/L    ALT 21 0 - 50 U/L    Protein Total 7.0 6.4 - 8.3 g/dL    Albumin 4.2 3.5 - 5.2 g/dL    Bilirubin Total 0.5 <=1.2 mg/dL    Patient Fasting > 8hrs? Unknown    CBC with platelets    Collection Time: 12/27/24  1:22 PM   Result Value Ref Range    WBC Count 7.4 4.0 - 11.0 10e3/uL    RBC Count 4.71 3.80 - 5.20 10e6/uL    Hemoglobin 14.1 11.7 - 15.7 g/dL    Hematocrit 43.9 35.0 - 47.0 %    MCV 93 78 - 100 fL    MCH 29.9 26.5 - 33.0 pg    MCHC 32.1 31.5 - 36.5 g/dL    RDW 12.6 10.0 - 15.0 %    Platelet Count 227 150 - 450 10e3/uL   CRP inflammation    Collection Time: 12/27/24  1:22 PM   Result Value Ref Range    CRP Inflammation 9.72 (H) <5.00 mg/L      Lab Results   Component Value Date    CR 1.05 (H) 12/27/2024    CR 1.09 (H) 12/21/2023    CR 1.03 (H) 12/02/2022    CR 0.95 12/13/2021    CR 0.79 01/06/2020      Current Outpatient Medications   Medication Sig Dispense Refill    acetaminophen (TYLENOL) 500 MG tablet Take 500 mg by mouth every 8 hours as needed for mild pain.      amLODIPine (NORVASC) 5 MG tablet Take 1 tablet (5 mg) by mouth every evening. Take with the pravastatin  (Pravachol) 90 tablet 3    calcium carbonate (OS-STERLING) 600 mg calcium (1,500 mg) tablet Take 600 mg by mouth daily.      CHOLECALCIFEROL PO Take 2,000 Units by mouth daily      irbesartan (AVAPRO) 300 MG tablet TAKE 1 TABLET BY MOUTH EVERY DAY 90 tablet 3    multivitamin therapeutic (THERAGRAN) tablet [MULTIVITAMIN THERAPEUTIC (THERAGRAN) TABLET] Take 1 tablet by mouth every morning.      pravastatin (PRAVACHOL) 40 MG tablet TAKE 1 TABLET BY MOUTH EVERY DAY IN THE MORNING 90 tablet 3    PROAIR RESPICLICK 108 (90 Base) MCG/ACT inhaler INHALE 1 PUFF BY MOUTH EVERY 4 HOURS AS NEEDED 1 each 6    QVAR REDIHALER 80 MCG/ACT inhaler INHALE 1 PUFF BY MOUTH TWICE A DAY 21.2 g 3     No current facility-administered medications for this visit.      ______________________________________________________________________

## 2024-12-30 NOTE — TELEPHONE ENCOUNTER
Pt returned call. Relayed message below. She is understanding. States the abdominal discomfort has improved, attributes symptoms to food on Ay[place]. No other questions.

## 2025-01-13 ENCOUNTER — OFFICE VISIT (OUTPATIENT)
Dept: PODIATRY | Facility: CLINIC | Age: 85
End: 2025-01-13
Payer: COMMERCIAL

## 2025-01-13 VITALS
HEART RATE: 69 BPM | HEIGHT: 65 IN | DIASTOLIC BLOOD PRESSURE: 80 MMHG | SYSTOLIC BLOOD PRESSURE: 125 MMHG | BODY MASS INDEX: 33.32 KG/M2 | WEIGHT: 200 LBS

## 2025-01-13 DIAGNOSIS — B07.0 PLANTAR WARTS: Primary | ICD-10-CM

## 2025-01-13 PROCEDURE — 99203 OFFICE O/P NEW LOW 30 MIN: CPT | Performed by: PODIATRIST

## 2025-01-13 NOTE — PATIENT INSTRUCTIONS
Plantar warts    Causes  Plantar warts are skin lesions caused by invading viruses from the surrounding environment.   The viruses traveled down to the base layer of your skin where skin cells are being made.   The viruses travel inside the newly forming skin cells and take over the production of new skin cells.   This causes dramatic increase in his skin is so production causing the raised bumps at you feel.   Normally the immune system will remove foreign viruses easily. In this situation, however, the viruses are inside the bodies old skin cells and therefore cannot be seen by the immune cells.  Treatments  Goal of treatment: activate the immune response to the viruses resulting removal of the wart tissue and life-long immunity to future viral attacks.  There are various treatments for warts.   Acids: Salicylic acid (Compound W)  Liquid nitrogen   Topical antiviral medication creams   Topical blistering medications (Cantharidin)

## 2025-01-13 NOTE — PROGRESS NOTES
PATIENT HISTORY:  Aranza Mcmanus is a 84 year old female who presents to clinic  with a chief complaint of painful sore on the bottom of the right foot..  The patient is seen by themselves.  The patient relates the pain is primarily located around the ball of the right foot.  Reports insidious onset without acute precipitating event.  The patient relates that the symptoms have been going on for several week(s).  The patient has previously tried different shoes with little relief.  Any previous notes and studies that pertain to the patient's condition were reviewed.    Pertinent medical, surgical and family history was reviewed in the [facility] chart.    Past Medical History:   Past Medical History:   Diagnosis Date    Acute blood loss anemia 2/18/2015    Arthritis     Asthma     Asthma-COPD overlap syndrome (H) 3/1/2022    Attention deficit disorder (ADD) in adult 3/28/2020    Chronic GERD 3/28/2020    Former smoker, stopped smoking in distant past     Gallstone pancreatitis 9/9/2021    HTN (hypertension)     Hypercholesteremia     Hypertension     Mild intermittent asthma with exacerbation 1/6/2020    Osteopenia 3/28/2020    Right pulmonary embolus (H) 1/6/2020    EXAM: CTA CHEST PE RUN  LOCATION: [facility]  DATE/TIME: 1/6/2020 1:43 AM   INDICATION: Pleuritic right lower chest pain. Right upper quadrant pain.  COMPARISON: None.  TECHNIQUE: CT angiogram chest during arterial phase injection IV contrast. 2D and 3D MIP reconstructions were performed by the CT technologist. Dose reduction techniques were used.  CONTRAST: Iohexol (Omni) 100 mL   FINDIN       Medications:   Current Outpatient Medications:     acetaminophen (TYLENOL) 500 MG tablet, Take 500 mg by mouth every 8 hours as needed for mild pain., Disp: , Rfl:     amLODIPine (NORVASC) 5 MG tablet, Take 1 tablet (5 mg) by mouth every evening. Take with the pravastatin (Pravachol), Disp: 90 tablet, Rfl: 3    calcium carbonate (OS-STERLING) 600 mg calcium (1,500  "mg) tablet, Take 600 mg by mouth daily., Disp: , Rfl:     CHOLECALCIFEROL PO, Take 2,000 Units by mouth daily, Disp: , Rfl:     irbesartan (AVAPRO) 300 MG tablet, TAKE 1 TABLET BY MOUTH EVERY DAY, Disp: 90 tablet, Rfl: 3    multivitamin therapeutic (THERAGRAN) tablet, [MULTIVITAMIN THERAPEUTIC (THERAGRAN) TABLET] Take 1 tablet by mouth every morning., Disp: , Rfl:     pravastatin (PRAVACHOL) 40 MG tablet, TAKE 1 TABLET BY MOUTH EVERY DAY IN THE MORNING, Disp: 90 tablet, Rfl: 3    PROAIR RESPICLICK 108 (90 Base) MCG/ACT inhaler, INHALE 1 PUFF BY MOUTH EVERY 4 HOURS AS NEEDED, Disp: 1 each, Rfl: 6    QVAR REDIHALER 80 MCG/ACT inhaler, INHALE 1 PUFF BY MOUTH TWICE A DAY, Disp: 21.2 g, Rfl: 3     Allergies:    Allergies   Allergen Reactions    Morphine      Other reaction(s): Hallucinations    Valacyclovir Hives and Unknown    Azithromycin Nausea and Vomiting and Rash     And fever reported.    Mold      Other reaction(s): Runny Nose, Wheezing    Oxycodone Other (See Comments)     Too strong.     Perfume      Other reaction(s): Runny Nose, Wheezing    Tobacco [Tobacco]      Other reaction(s): Runny Nose, Wheezing    \"Smoke\"       Vitals: /80   Pulse 69   Ht 1.651 m (5' 5\")   Wt 90.7 kg (200 lb)   LMP  (LMP Unknown)   BMI 33.28 kg/m    BMI= Body mass index is 33.28 kg/m .    LOWER EXTREMITY PHYSICAL EXAM    Dermatologic: Noted verrucous lesion on the plantar aspect of the ball of the right foot.  No surrounding erythema or edema noted.  Skin is otherwise intact to right lower extremity without significant lesions, rash or abrasion.        Vascular: DP & PT pulses are intact & regular on the right.   CFT and skin temperature is normal to the right lower extremity.     Neurologic: Lower extremity sensation is intact to light touch.  No evidence of weakness in the right lower extremity.        Musculoskeletal: Patient is ambulatory without assistive device or brace.  No gross ankle deformity noted.  No foot or " ankle joint effusion is noted.              ASSESSMENT / PLAN:     ICD-10-CM    1. Plantar warts  B07.0           I have explained to Aranza about the conditions.  We discussed the underlying contributing factors to the condition as well as both conservative and surgical treatment options along with expected length of recovery.  At this time, the patient was instructed to continue using her over-the-counter wart medication.  If the lesion does not resolve she may return to the office for further treatment options.    Aranza verbalized agreement with and understanding of the rational for the diagnosis and treatment plan.  All questions were answered to best of my ability and the patient's satisfaction. The patient was advised to contact the clinic with any questions that may arise after the clinic visit.      Disclaimer: This note consists of symbols derived from keyboarding, dictation and/or voice recognition software. As a result, there may be errors in the script that have gone undetected. Please consider this when interpreting information found in this chart.       FRANKIE Huertas D.P.M., F.A.C.F.A.S.

## 2025-01-13 NOTE — LETTER
1/13/2025      Aranza Mcmanus  [address]  [facility] [address]      Dear Colleague,    Thank you for referring your patient, Aranza Mcmanus, to the [facility]. Please see a copy of my visit note below.    PATIENT HISTORY:  Aranza Mcmanus is a 84 year old female who presents to clinic  with a chief complaint of painful sore on the bottom of the right foot..  The patient is seen by themselves.  The patient relates the pain is primarily located around the ball of the right foot.  Reports insidious onset without acute precipitating event.  The patient relates that the symptoms have been going on for several week(s).  The patient has previously tried different shoes with little relief.  Any previous notes and studies that pertain to the patient's condition were reviewed.    Pertinent medical, surgical and family history was reviewed in the [facility] chart.    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 Right upper quadrant pain.  COMPARISON: None.  TECHNIQUE: CT angiogram chest during arterial phase injection IV contrast. 2D and 3D MIP reconstructions were performed by the CT technologist. Dose reduction techniques were used.  CONTRAST: Iohexol (Omni) 100 mL   FINDIN       Medications:   Current Outpatient Medications:      acetaminophen (TYLENOL) 500 MG tablet, Take  "500 mg by mouth every 8 hours as needed for mild pain., Disp: , Rfl:      amLODIPine (NORVASC) 5 MG tablet, Take 1 tablet (5 mg) by mouth every evening. Take with the pravastatin (Pravachol), Disp: 90 tablet, Rfl: 3     calcium carbonate (OS-STERLING) 600 mg calcium (1,500 mg) tablet, Take 600 mg by mouth daily., Disp: , Rfl:      CHOLECALCIFEROL PO, Take 2,000 Units by mouth daily, Disp: , Rfl:      irbesartan (AVAPRO) 300 MG tablet, TAKE 1 TABLET BY MOUTH EVERY DAY, Disp: 90 tablet, Rfl: 3     multivitamin therapeutic (THERAGRAN) tablet, [MULTIVITAMIN THERAPEUTIC (THERAGRAN) TABLET] Take 1 tablet by mouth every morning., Disp: , Rfl:      pravastatin (PRAVACHOL) 40 MG tablet, TAKE 1 TABLET BY MOUTH EVERY DAY IN THE MORNING, Disp: 90 tablet, Rfl: 3     PROAIR RESPICLICK 108 (90 Base) MCG/ACT inhaler, INHALE 1 PUFF BY MOUTH EVERY 4 HOURS AS NEEDED, Disp: 1 each, Rfl: 6     QVAR REDIHALER 80 MCG/ACT inhaler, INHALE 1 PUFF BY MOUTH TWICE A DAY, Disp: 21.2 g, Rfl: 3     Allergies:    Allergies   Allergen Reactions     Morphine      Other reaction(s): Hallucinations     Valacyclovir Hives and Unknown     Azithromycin Nausea and Vomiting and Rash     And fever reported.     Mold      Other reaction(s): Runny Nose, Wheezing     Oxycodone Other (See Comments)     Too strong.      Perfume      Other reaction(s): Runny Nose, Wheezing     Tobacco [Tobacco]      Other reaction(s): Runny Nose, Wheezing    \"Smoke\"       Vitals: /80   Pulse 69   Ht 1.651 m (5' 5\")   Wt 90.7 kg (200 lb)   LMP  (LMP Unknown)   BMI 33.28 kg/m    BMI= Body mass index is 33.28 kg/m .    LOWER EXTREMITY PHYSICAL EXAM    Dermatologic: Noted verrucous lesion on the plantar aspect of the ball of the right foot.  No surrounding erythema or edema noted.  Skin is otherwise intact to right lower extremity without significant lesions, rash or abrasion.        Vascular: DP & PT pulses are intact & regular on the right.   CFT and skin temperature is normal " to the right lower extremity.     Neurologic: Lower extremity sensation is intact to light touch.  No evidence of weakness in the right lower extremity.        Musculoskeletal: Patient is ambulatory without assistive device or brace.  No gross ankle deformity noted.  No foot or ankle joint effusion is noted.              ASSESSMENT / PLAN:     ICD-10-CM    1. Plantar warts  B07.0           I have explained to rAanza about the conditions.  We discussed the underlying contributing factors to the condition as well as both conservative and surgical treatment options along with expected length of recovery.  At this time, the patient was instructed to continue using her over-the-counter wart medication.  If the lesion does not resolve she may return to the office for further treatment options.    Aranza verbalized agreement with and understanding of the rational for the diagnosis and treatment plan.  All questions were answered to best of my ability and the patient's satisfaction. The patient was advised to contact the clinic with any questions that may arise after the clinic visit.      Disclaimer: This note consists of symbols derived from keyboarding, dictation and/or voice recognition software. As a result, there may be errors in the script that have gone undetected. Please consider this when interpreting information found in this chart.       FRANKIE Huertas D.P.M., F.A.C.F.A.S.      Again, thank you for allowing me to participate in the care of your patient.        Sincerely,        Alcides Huertas DPM    Electronically signed

## 2025-01-13 NOTE — NURSING NOTE
"Chief Complaint   Patient presents with    Consult     Right foot- corn- tried over the counter medication  Left foot concern- has numbness  Over 10 years ago fractured fibula        Initial /80   Pulse 69   Ht 1.651 m (5' 5\")   Wt 90.7 kg (200 lb)   LMP  (LMP Unknown)   BMI 33.28 kg/m   Estimated body mass index is 33.28 kg/m  as calculated from the following:    Height as of this encounter: 1.651 m (5' 5\").    Weight as of this encounter: 90.7 kg (200 lb).  Medications and allergies reviewed.      Galilea MARQUES MA    "

## 2025-02-03 ENCOUNTER — OFFICE VISIT (OUTPATIENT)
Dept: FAMILY MEDICINE | Facility: CLINIC | Age: 85
End: 2025-02-03
Payer: COMMERCIAL

## 2025-02-03 ENCOUNTER — ANCILLARY PROCEDURE (OUTPATIENT)
Dept: GENERAL RADIOLOGY | Facility: CLINIC | Age: 85
End: 2025-02-03
Attending: NURSE PRACTITIONER
Payer: COMMERCIAL

## 2025-02-03 VITALS
TEMPERATURE: 98.9 F | BODY MASS INDEX: 33.7 KG/M2 | WEIGHT: 202.3 LBS | HEIGHT: 65 IN | HEART RATE: 85 BPM | DIASTOLIC BLOOD PRESSURE: 79 MMHG | SYSTOLIC BLOOD PRESSURE: 116 MMHG | RESPIRATION RATE: 18 BRPM | OXYGEN SATURATION: 95 %

## 2025-02-03 DIAGNOSIS — R05.1 ACUTE COUGH: ICD-10-CM

## 2025-02-03 DIAGNOSIS — J44.1 COPD EXACERBATION (H): Primary | ICD-10-CM

## 2025-02-03 DIAGNOSIS — J44.89 ASTHMA-COPD OVERLAP SYNDROME (H): ICD-10-CM

## 2025-02-03 DIAGNOSIS — J01.00 ACUTE NON-RECURRENT MAXILLARY SINUSITIS: ICD-10-CM

## 2025-02-03 LAB
B PARAPERT DNA SPEC QL NAA+PROBE: NOT DETECTED
B PERT DNA SPEC QL NAA+PROBE: NOT DETECTED

## 2025-02-03 PROCEDURE — G2211 COMPLEX E/M VISIT ADD ON: HCPCS | Performed by: NURSE PRACTITIONER

## 2025-02-03 PROCEDURE — 71046 X-RAY EXAM CHEST 2 VIEWS: CPT | Mod: TC | Performed by: RADIOLOGY

## 2025-02-03 PROCEDURE — 87798 DETECT AGENT NOS DNA AMP: CPT | Performed by: NURSE PRACTITIONER

## 2025-02-03 PROCEDURE — 99214 OFFICE O/P EST MOD 30 MIN: CPT | Performed by: NURSE PRACTITIONER

## 2025-02-03 RX ORDER — PREDNISONE 20 MG/1
40 TABLET ORAL DAILY
Qty: 10 TABLET | Refills: 0 | Status: SHIPPED | OUTPATIENT
Start: 2025-02-03 | End: 2025-02-08

## 2025-02-03 RX ORDER — DOXYCYCLINE HYCLATE 100 MG
100 TABLET ORAL 2 TIMES DAILY
Qty: 14 TABLET | Refills: 0 | Status: SHIPPED | OUTPATIENT
Start: 2025-02-03 | End: 2025-02-10

## 2025-02-03 SDOH — HEALTH STABILITY: PHYSICAL HEALTH: ON AVERAGE, HOW MANY DAYS PER WEEK DO YOU ENGAGE IN MODERATE TO STRENUOUS EXERCISE (LIKE A BRISK WALK)?: 3 DAYS

## 2025-02-03 ASSESSMENT — PAIN SCALES - GENERAL: PAINLEVEL_OUTOF10: NO PAIN (0)

## 2025-02-03 ASSESSMENT — ASTHMA QUESTIONNAIRES
ACT_TOTALSCORE: 16
QUESTION_1 LAST FOUR WEEKS HOW MUCH OF THE TIME DID YOUR ASTHMA KEEP YOU FROM GETTING AS MUCH DONE AT WORK, SCHOOL OR AT HOME: SOME OF THE TIME
ACT_TOTALSCORE: 16
QUESTION_5 LAST FOUR WEEKS HOW WOULD YOU RATE YOUR ASTHMA CONTROL: SOMEWHAT CONTROLLED
QUESTION_4 LAST FOUR WEEKS HOW OFTEN HAVE YOU USED YOUR RESCUE INHALER OR NEBULIZER MEDICATION (SUCH AS ALBUTEROL): ONE OR TWO TIMES PER DAY
QUESTION_2 LAST FOUR WEEKS HOW OFTEN HAVE YOU HAD SHORTNESS OF BREATH: ONCE OR TWICE A WEEK
QUESTION_3 LAST FOUR WEEKS HOW OFTEN DID YOUR ASTHMA SYMPTOMS (WHEEZING, COUGHING, SHORTNESS OF BREATH, CHEST TIGHTNESS OR PAIN) WAKE YOU UP AT NIGHT OR EARLIER THAN USUAL IN THE MORNING: ONCE OR TWICE

## 2025-02-03 ASSESSMENT — SOCIAL DETERMINANTS OF HEALTH (SDOH): HOW OFTEN DO YOU GET TOGETHER WITH FRIENDS OR RELATIVES?: THREE TIMES A WEEK

## 2025-02-03 NOTE — PROGRESS NOTES
Assessment & Plan     COPD exacerbation (H)  CXR appears fine on the anterior view but there is some questionable fluid in the lateral view.  Since she has history of COPD and coughing up sputum, will treat with doxycycline and prednisone for COPD exacerbation and sinus infection.  Advised her to continue her nebs and current treatment for the asthma/COPD.  I am still awaiting radiology report to see if this CXR is normal or if there is any pleural effusion noted.  If pneumonia is noted on CXR, plan to follow-up in 1 week for recheck, otherwise follow-up as needed if symptoms are not improving.  - doxycycline hyclate (VIBRA-TABS) 100 MG tablet; Take 1 tablet (100 mg) by mouth 2 times daily for 7 days.  - predniSONE (DELTASONE) 20 MG tablet; Take 2 tablets (40 mg) by mouth daily for 5 days.    Asthma-COPD overlap syndrome (H)  Patient will continue current treatment.    Acute cough  Pertussis testing completed due to coughing fits and long duration of the cough.  This is negative.  - B. pertussis/parapertussis PCR-NP  - XR Chest 2 Views; Future    Acute non-recurrent maxillary sinusitis  See note above.  - doxycycline hyclate (VIBRA-TABS) 100 MG tablet; Take 1 tablet (100 mg) by mouth 2 times daily for 7 days.  - predniSONE (DELTASONE) 20 MG tablet; Take 2 tablets (40 mg) by mouth daily for 5 days.    See Patient Instructions    Tila Cobian is a 84 year old, presenting for the following health issues:  Other (Cough/cold)        2/3/2025    10:41 AM   Additional Questions   Roomed by rmb   Accompanied by self         2/3/2025    10:41 AM   Patient Reported Additional Medications   Patient reports taking the following new medications none     HPI     Acute Illness  Acute illness concerns: cough/cold  Onset/Duration: 2-3 weeks  Symptoms:  Fever: has felt hot but thermometer was not working, so unsure  Chills/Sweats: YES, sweats at night in bed (has had sweats frequently within the past 6-7 months)  This is  "intermittent and not all the time.  Headache (location?): No  Sinus Pressure: YES  Conjunctivitis:  No  Ear Pain: no  Rhinorrhea: YES, clear  Congestion: YES  Sore Throat: No  Cough: YES-productive of yellow sputum, productive of green sputum, this is thick but mostly in the mornings; did have one time there was blood but felt it was due to the hardness of the cough  Wheeze: No  Decreased Appetite: No  Nausea: No  Vomiting: YES, yesterday after a coughing fit  Diarrhea: No  Dysuria/Freq.: No  Dysuria or Hematuria: No  Fatigue/Achiness: YES, fatigued, taking tylenol for aches   Sick/Strep Exposure: No  Therapies tried and outcome: been using nebulizer and it seems to give relief, using hot cloth and taking bath/shower to help relieve sinus pressure    Worse symptoms are at night and she will get coughing jags and worry about others in her building hearing this.  She feels like things were getting better but the night time      Review of Systems  CONSTITUTIONAL: NEGATIVE for fever, chills, change in weight  ENT/MOUTH: POSITIVE for nasal congestion, rhinorrhea-clear, and sinus pressure  RESP:POSITIVE for cough-productive, Hx asthma, Hx COPD, and wheezing  CV: NEGATIVE for chest pain, palpitations or peripheral edema  PSYCHIATRIC: NEGATIVE for changes in mood or affect  ROS otherwise negative      Objective    /79 (BP Location: Left arm, Patient Position: Sitting, Cuff Size: Adult Regular)   Pulse 85   Temp 98.9  F (37.2  C) (Tympanic)   Resp 18   Ht 1.645 m (5' 4.76\")   Wt 91.8 kg (202 lb 4.8 oz)   LMP  (LMP Unknown)   SpO2 95%   BMI 33.91 kg/m    Body mass index is 33.91 kg/m .  Physical Exam   GENERAL: alert and no distress  HENT: ear canals and TM's normal, nose and mouth without ulcers or lesions  NECK: no adenopathy, no asymmetry, masses, or scars  RESP: crackles heard in bilateral lower lobes, otherwise clear in upper lobes  CV: regular rate and rhythm, normal S1 S2, no S3 or S4, no murmur, click or " rub, no peripheral edema  MS: no gross musculoskeletal defects noted, no edema  PSYCH: mentation appears normal, affect normal/bright    Results for orders placed or performed in visit on 02/03/25   B. pertussis/parapertussis PCR-NP     Status: Normal    Specimen: Nasopharyngeal; Swab   Result Value Ref Range    Bordetella pertussis DNA Not Detected Not Detected    Bordetella parapertussis DNA Not Detected Not Detected    Narrative    The [employer] Molecular Simplex[employer] (TM) Bordetella Direct assay is a FDA-approved, real-time PCR test for the qualitative detection and differentiation of Bordetella pertussis and Bordetella parapertussis in nasopharyngeal swabs. Testing is performed by the [facility]. This test is used for clinical purposes. It should not be regarded as investigational or for research. This laboratory is certified under the Clinical Laboratory Improvement Amendments of 1988 (CLIA-88) as qualified to perform high complexity clinical laboratory testing.           Signed Electronically by: La Thomas NP

## 2025-02-03 NOTE — PROGRESS NOTES
Preventive Care Visit  [facility]  La Thomas NP, Family Medicine  Feb 3, 2025  {Provider  Link to SmartSet :148744}    {PROVIDER CHARTING PREFERENCE:820584}    Tila Cobian is a 84 year old, presenting for the following:  Physical        2/3/2025    10:41 AM   Additional Questions   Roomed by rmb   Accompanied by self         2/3/2025    10:41 AM   Patient Reported Additional Medications   Patient reports taking the following new medications none     {ROOMER if patient is in their first year of Medicare a vision screen is required click here to document the Vison screen and then refresh the note to pull in results  :267964}      HPI  ***  {MA/LPN/RN Pre-Provider Visit Orders- hCG/UA/Strep (Optional):385191}  {SUPERLIST (Optional):859918}  {additonal problems for provider to add (Optional):647929}  Health Care Directive  Patient does not have a Health Care Directive: {ADVANCE_DIRECTIVE_STATUS:274196}      2/3/2025   General Health   How would you rate your overall physical health? Good   Feel stress (tense, anxious, or unable to sleep) Not at all         2/3/2025   Nutrition   Diet: Regular (no restrictions)         2/3/2025   Exercise   Days per week of moderate/strenous exercise 3 days         2/3/2025   Social Factors   Frequency of gathering with friends or relatives Three times a week   Worry food won't last until get money to buy more No   Food not last or not have enough money for food? No   Do you have housing? (Housing is defined as stable permanent housing and does not include staying ouside in a car, in a tent, in an abandoned building, in an overnight shelter, or couch-surfing.) Yes   Are you worried about losing your housing? No   Lack of transportation? No   Unable to get utilities (heat,electricity)? No         2/3/2025   Fall Risk   Fallen 2 or more times in the past year? No   Trouble with walking or balance? No          2/3/2025   Activities of Daily  Living- Home Safety   Needs help with the following daily activites None of the above   Safety concerns in the home None of the above         2/3/2025   Dental   Dentist two times every year? Yes         2/3/2025   Hearing Screening   Hearing concerns? None of the above         2/3/2025   Driving Risk Screening   Patient/family members have concerns about driving No         2/3/2025   General Alertness/Fatigue Screening   Have you been more tired than usual lately? No         2/3/2025   Urinary Incontinence Screening   Bothered by leaking urine in past 6 months No         2024   TB Screening   Were you born outside of the [place]? No         Today's PHQ-2 Score:       2/3/2025    10:48 AM   PHQ-2 (  [employer])   Q1: Little interest or pleasure in doing things 0   Q2: Feeling down, depressed or hopeless 0   PHQ-2 Score 0    Q1: Little interest or pleasure in doing things Not at all   Q2: Feeling down, depressed or hopeless Not at all   PHQ-2 Score 0       Patient-reported           2/3/2025   Substance Use   Alcohol more than 3/day or more than 7/wk No   Do you have a current opioid prescription? No   How severe/bad is pain from 1 to 10? 0/10 (No Pain)   Do you use any other substances recreationally? No     Social History     Tobacco Use    Smoking status: Former     Current packs/day: 0.00     Average packs/day: 1 pack/day for 6.0 years (6.0 ttl pk-yrs)     Types: Cigarettes     Start date: 1969     Quit date: 1975     Years since quittin.1     Passive exposure: Current    Smokeless tobacco: Never   Vaping Use    Vaping status: Never Used   Substance Use Topics    Alcohol use: Yes     Alcohol/week: 0.8 standard drinks of alcohol     Comment: Alcoholic Drinks/day: occ.    Drug use: Not Currently     Types: Marijuana     {Provider  If there are gaps in the social history shown above, please follow the link to update and then refresh the note Link to Social and Substance History :694221}      2023    LAST FHS-7 RESULTS   1st degree relative breast or ovarian cancer No    No   Any relative bilateral breast cancer No    No   Any male have breast cancer No    No   Any ONE woman have BOTH breast AND ovarian cancer No    No   Any woman with breast cancer before 50yrs Yes    No   2 or more relatives with breast AND/OR ovarian cancer No    No   2 or more relatives with breast AND/OR bowel cancer No    No       Multiple values from one day are sorted in reverse-chronological order     {If any of the questions to the FHS7 are answered yes, consider referral for genetic counseling.    Additional indications for genetic referral include personal history of breast or ovarian cancer, genetic mutation in 1st degree relative which increases risk of breast cancer including BRCA1, BRCA2, CEDRIC, PALB 2, TP53, CHEK2, PTEN, CDH1, STK11 (per ACS) and/or 1st degree relative with history of pancreatic or high-risk prostate cancer (per NCCN):823748}   {Mammogram Decision Support (Optional):601820}      {Link to Fracture Risk Assessment Tool (Optional):214188}    {Provider  REQUIRED FOR AWV Use the storyboard to review patient history, after sections have been marked as reviewed, refresh note to capture documentation:892294}  {Provider   REQUIRED AWV use this link to review and update sexual activity history  after section has been marked as reviewed, refresh note to capture documentation:100494}  Reviewed and updated as needed this visit by Provider                    {HISTORY OPTIONS (Optional):052111}  Current providers sharing in care for this patient include:  Patient Care Team:  Agustin Okeefe MD as PCP - General (Internal Medicine)  Agustin Okeefe MD as Assigned PCP  Debi Santiago MD as MD (Ophthalmology)  Manish Morales MD as Assigned Pulmonology Provider  Eliza Maria MD as MD (OB/Gyn)  Eliza Maria MD as Assigned OBGYN Provider  Aleah Rangel MA as Audiologist (Audiology)  Alcides Huertas,  "DPM as Assigned Surgical Provider    The following health maintenance items are reviewed in Epic and correct as of today:  Health Maintenance   Topic Date Due    ANNUAL REVIEW OF HM ORDERS  12/13/2022    ASTHMA ACTION PLAN  02/28/2062 (Originally 1940)    COVID-19 Vaccine (8 - 2024-25 season) 04/30/2025    ASTHMA CONTROL TEST  08/03/2025    BMP  12/27/2025    LIPID  12/27/2025    MICROALBUMIN  12/27/2025    HEMOGLOBIN  12/27/2025    MEDICARE ANNUAL WELLNESS VISIT  02/03/2026    FALL RISK ASSESSMENT  02/03/2026    ADVANCE CARE PLANNING  12/21/2028    DEXA  12/13/2033    PHQ-2 (once per calendar year)  Completed    INFLUENZA VACCINE  Completed    Pneumococcal Vaccine: 50+ Years  Completed    URINALYSIS  Completed    ZOSTER IMMUNIZATION  Completed    RSV VACCINE  Completed    HPV IMMUNIZATION  Aged Out    MENINGITIS IMMUNIZATION  Aged Out    RSV MONOCLONAL ANTIBODY  Aged Out    DTAP/TDAP/TD IMMUNIZATION  Discontinued       {ROS Picklists (Optional):802622}     Objective    Exam  LMP  (LMP Unknown)    Estimated body mass index is 33.28 kg/m  as calculated from the following:    Height as of 1/13/25: 1.651 m (5' 5\").    Weight as of 1/13/25: 90.7 kg (200 lb).    Physical Exam  {Exam Choices (Optional):343221}  {Provider  The Mini-Cog is incomplete, use link to complete and refresh note Link to Mini-Cog :903500}      12/27/2024   Mini Cog   Clock Draw Score 2 Normal   3 Item Recall 3 objects recalled   Mini Cog Total Score 5     {A Mini-Cog total score of 0-2 suggests the possibility of dementia, score of 3-5 suggests no dementia:712132}         Signed Electronically by: La Thomas NP  {Email feedback regarding this note to [email or web address]   :060938}  "

## 2025-02-03 NOTE — PATIENT INSTRUCTIONS
Take the antibiotic doxycycline twice daily for 7 days.  Take prednisone 40 mg (2 tabs) daily in the morning for 5 days.  Continue nebs at bedtime as needed.  If x-ray shows pneumonia, would recommend follow-up in 1 week for recheck, otherwise follow-up as needed if symptoms are not improving with treatment.

## 2025-02-14 ENCOUNTER — TELEPHONE (OUTPATIENT)
Dept: INTERNAL MEDICINE | Facility: CLINIC | Age: 85
End: 2025-02-14
Payer: COMMERCIAL

## 2025-02-14 NOTE — TELEPHONE ENCOUNTER
Can use reserved slot for this if desired.    Can be seen in WALK IN CARE or ADS if more acute issues are present.    Agustin Okeefe MD  General Internal Medicine  [facility]  2/14/2025, 1:13 PM

## 2025-02-14 NOTE — TELEPHONE ENCOUNTER
Reason for Call:  Appointment Request    Patient requesting this type of appt:  Office Visit     Requested provider: Agustin Okeefe    Reason patient unable to be scheduled: Not within requested timeframe    When does patient want to be seen/preferred time: 3-7 days    Comments: Want to be put on wait list for any cancellations for abdomin pain     Could we send this information to you in [facility] or would you prefer to receive a phone call?:   Patient would prefer a phone call   Okay to leave a detailed message?: Yes at Cell number on file:    Telephone Information:   Mobile [phone]       Call taken on 2/14/2025 at 1:04 PM by Cecily Castellanos

## 2025-02-15 ENCOUNTER — HOSPITAL ENCOUNTER (EMERGENCY)
Facility: CLINIC | Age: 85
Discharge: HOME | End: 2025-02-15
Attending: FAMILY MEDICINE | Admitting: FAMILY MEDICINE
Payer: COMMERCIAL

## 2025-02-15 ENCOUNTER — APPOINTMENT (OUTPATIENT)
Dept: CT IMAGING | Facility: CLINIC | Age: 85
End: 2025-02-15
Attending: FAMILY MEDICINE
Payer: COMMERCIAL

## 2025-02-15 VITALS
HEART RATE: 66 BPM | SYSTOLIC BLOOD PRESSURE: 134 MMHG | WEIGHT: 201.5 LBS | RESPIRATION RATE: 22 BRPM | DIASTOLIC BLOOD PRESSURE: 63 MMHG | TEMPERATURE: 98 F | BODY MASS INDEX: 33.78 KG/M2 | OXYGEN SATURATION: 99 %

## 2025-02-15 DIAGNOSIS — M54.50 ACUTE LEFT-SIDED LOW BACK PAIN WITHOUT SCIATICA: ICD-10-CM

## 2025-02-15 LAB
ALBUMIN UR-MCNC: NEGATIVE MG/DL
ANION GAP SERPL CALCULATED.3IONS-SCNC: 8 MMOL/L (ref 7–15)
APPEARANCE UR: CLEAR
BASOPHILS # BLD AUTO: 0.1 10E3/UL (ref 0–0.2)
BASOPHILS NFR BLD AUTO: 1 %
BILIRUB UR QL STRIP: NEGATIVE
BUN SERPL-MCNC: 20.7 MG/DL (ref 8–23)
CALCIUM SERPL-MCNC: 7.8 MG/DL (ref 8.8–10.4)
CHLORIDE SERPL-SCNC: 107 MMOL/L (ref 98–107)
COLOR UR AUTO: YELLOW
CREAT SERPL-MCNC: 0.9 MG/DL (ref 0.51–0.95)
EGFRCR SERPLBLD CKD-EPI 2021: 64 ML/MIN/1.73M2
EOSINOPHIL # BLD AUTO: 0.1 10E3/UL (ref 0–0.7)
EOSINOPHIL NFR BLD AUTO: 2 %
ERYTHROCYTE [DISTWIDTH] IN BLOOD BY AUTOMATED COUNT: 12.6 % (ref 10–15)
GLUCOSE SERPL-MCNC: 86 MG/DL (ref 70–99)
GLUCOSE UR STRIP-MCNC: NEGATIVE MG/DL
HCO3 SERPL-SCNC: 23 MMOL/L (ref 22–29)
HCT VFR BLD AUTO: 41.7 % (ref 35–47)
HGB BLD-MCNC: 13.4 G/DL (ref 11.7–15.7)
HGB UR QL STRIP: NEGATIVE
HOLD SPECIMEN: NORMAL
IMM GRANULOCYTES # BLD: 0.1 10E3/UL
IMM GRANULOCYTES NFR BLD: 1 %
KETONES UR STRIP-MCNC: NEGATIVE MG/DL
LEUKOCYTE ESTERASE UR QL STRIP: ABNORMAL
LYMPHOCYTES # BLD AUTO: 1.5 10E3/UL (ref 0.8–5.3)
LYMPHOCYTES NFR BLD AUTO: 16 %
MCH RBC QN AUTO: 29.9 PG (ref 26.5–33)
MCHC RBC AUTO-ENTMCNC: 32.1 G/DL (ref 31.5–36.5)
MCV RBC AUTO: 93 FL (ref 78–100)
MONOCYTES # BLD AUTO: 0.7 10E3/UL (ref 0–1.3)
MONOCYTES NFR BLD AUTO: 8 %
MUCOUS THREADS #/AREA URNS LPF: PRESENT /LPF
NEUTROPHILS # BLD AUTO: 6.6 10E3/UL (ref 1.6–8.3)
NEUTROPHILS NFR BLD AUTO: 73 %
NITRATE UR QL: NEGATIVE
NRBC # BLD AUTO: 0 10E3/UL
NRBC BLD AUTO-RTO: 0 /100
PH UR STRIP: 5.5 [PH] (ref 5–7)
PLATELET # BLD AUTO: 221 10E3/UL (ref 150–450)
POTASSIUM SERPL-SCNC: 3.7 MMOL/L (ref 3.4–5.3)
RBC # BLD AUTO: 4.48 10E6/UL (ref 3.8–5.2)
RBC URINE: 1 /HPF
SODIUM SERPL-SCNC: 138 MMOL/L (ref 135–145)
SP GR UR STRIP: 1.02 (ref 1–1.03)
SQUAMOUS EPITHELIAL: 2 /HPF
UROBILINOGEN UR STRIP-MCNC: NORMAL MG/DL
WBC # BLD AUTO: 9 10E3/UL (ref 4–11)
WBC URINE: 5 /HPF

## 2025-02-15 PROCEDURE — 99285 EMERGENCY DEPT VISIT HI MDM: CPT | Mod: 25 | Performed by: FAMILY MEDICINE

## 2025-02-15 PROCEDURE — 250N000011 HC RX IP 250 OP 636: Performed by: FAMILY MEDICINE

## 2025-02-15 PROCEDURE — 84132 ASSAY OF SERUM POTASSIUM: CPT | Performed by: FAMILY MEDICINE

## 2025-02-15 PROCEDURE — 85025 COMPLETE CBC W/AUTO DIFF WBC: CPT | Performed by: FAMILY MEDICINE

## 2025-02-15 PROCEDURE — 81001 URINALYSIS AUTO W/SCOPE: CPT | Performed by: FAMILY MEDICINE

## 2025-02-15 PROCEDURE — 99284 EMERGENCY DEPT VISIT MOD MDM: CPT | Performed by: FAMILY MEDICINE

## 2025-02-15 PROCEDURE — 96374 THER/PROPH/DIAG INJ IV PUSH: CPT | Mod: 59 | Performed by: FAMILY MEDICINE

## 2025-02-15 PROCEDURE — 36415 COLL VENOUS BLD VENIPUNCTURE: CPT | Performed by: FAMILY MEDICINE

## 2025-02-15 PROCEDURE — 250N000009 HC RX 250: Performed by: FAMILY MEDICINE

## 2025-02-15 PROCEDURE — 74177 CT ABD & PELVIS W/CONTRAST: CPT

## 2025-02-15 RX ORDER — KETOROLAC TROMETHAMINE 15 MG/ML
15 INJECTION, SOLUTION INTRAMUSCULAR; INTRAVENOUS ONCE
Status: COMPLETED | OUTPATIENT
Start: 2025-02-15 | End: 2025-02-15

## 2025-02-15 RX ORDER — IOPAMIDOL 755 MG/ML
98 INJECTION, SOLUTION INTRAVASCULAR ONCE
Status: COMPLETED | OUTPATIENT
Start: 2025-02-15 | End: 2025-02-15

## 2025-02-15 RX ADMIN — IOPAMIDOL 98 ML: 755 INJECTION, SOLUTION INTRAVENOUS at 12:24

## 2025-02-15 RX ADMIN — KETOROLAC TROMETHAMINE 15 MG: 15 INJECTION, SOLUTION INTRAMUSCULAR; INTRAVENOUS at 12:00

## 2025-02-15 RX ADMIN — SODIUM CHLORIDE 65 ML: 9 INJECTION, SOLUTION INTRAVENOUS at 12:24

## 2025-02-15 ASSESSMENT — COLUMBIA-SUICIDE SEVERITY RATING SCALE - C-SSRS
6. HAVE YOU EVER DONE ANYTHING, STARTED TO DO ANYTHING, OR PREPARED TO DO ANYTHING TO END YOUR LIFE?: NO
2. HAVE YOU ACTUALLY HAD ANY THOUGHTS OF KILLING YOURSELF IN THE PAST MONTH?: NO
1. IN THE PAST MONTH, HAVE YOU WISHED YOU WERE DEAD OR WISHED YOU COULD GO TO SLEEP AND NOT WAKE UP?: NO

## 2025-02-15 ASSESSMENT — ACTIVITIES OF DAILY LIVING (ADL)
ADLS_ACUITY_SCORE: 41
ADLS_ACUITY_SCORE: 41

## 2025-02-15 NOTE — ED TRIAGE NOTES
Pt has been having lower left back pain for the past 3 days. Denies any injury.     Triage Assessment (Adult)       Row Name 02/15/25 1142          Triage Assessment    Airway WDL WDL        Respiratory WDL    Respiratory WDL WDL        Skin Circulation/Temperature WDL    Skin Circulation/Temperature WDL WDL        Cardiac WDL    Cardiac WDL WDL        Peripheral/Neurovascular WDL    Peripheral Neurovascular WDL WDL        Cognitive/Neuro/Behavioral WDL    Cognitive/Neuro/Behavioral WDL WDL

## 2025-02-15 NOTE — ED PROVIDER NOTES
BP (!) 137/107   Pulse 98   Temp 98  F (36.7  C) (Tympanic)   Resp 24   LMP  (LMP Unknown)   SpO2 97%     Aranza Mcmanus is a 84-year-old female presenting with complaints of left low back and left lower abdominal pain.  Patient states her back pain radiates up the left side of her back.  States the last time she had pain like this, she had a blood clot in her lungs.  Patient also has history of COPD.  She reports an ongoing cough and shortness of breath that has been treated with antibiotics, nebulizers, and prednisone.  Recent chest x-ray at the beginning of this month was negative for pneumonia.  Given patient's history, I recommended she be evaluated in the emergency department.  We discussed that he/she will likely require further testing and/or treatment beyond the capabilities of the current urgent care setting.  Patient expresses understanding of this and agreement with the plan.      Floridalma Garrido PA-C  02/15/25 1139

## 2025-02-15 NOTE — ED PROVIDER NOTES
"  HPI   Patient is an 84-year-old female presenting with left low back pain.  Per my chart review, the patient has had a cholecystectomy.  She has had a hysterectomy.  She has chronic kidney disease.  She has COPD/asthma.  She has chronic reflux.  Former smoker.  No recent medication changes except for antibiotics for bladder infection about 2 weeks ago.    The patient has chronic left lower quadrant abdominal pain.  She tells me that this has been present for years.  Her last CT imaging was in 2022.  She denies new dysuria, urgency, or frequency of urination.  No bowel changes, specifically no diarrhea or constipation.  No hematochezia or melena.  No vaginal discharge or irritation of the ordinary.  No vaginal bleeding.  No trauma or injury.  She reports the left low back pain as nonradiating.  She denies left lower extremity pain or paresthesia.  No weakness.  No new difficulty with bowel or bladder.  No saddle anesthesia described.  She does report the pain is worsened with movement.      Allergies:  Allergies   Allergen Reactions    Morphine      Other reaction(s): Hallucinations    Valacyclovir Hives and Unknown    Azithromycin Nausea and Vomiting and Rash     And fever reported.    Mold      Other reaction(s): Runny Nose, Wheezing    Oxycodone Other (See Comments)     Too strong.     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  CT scan of chest shows signs of chronic obstructive pulmonary disease (COPD).      Chronic GERD 03/28/2020     Priority: Medium    Paresthesia of left foot 03/28/2020     Priority: Medium "     per EMG testing, nerve damage post left ankle fracture surgery      History of pulmonary embolism - 2020 01/06/2020     Priority: Medium     EXAM: CTA CHEST PE RUN   LOCATION: [facility]   DATE/TIME: 1/6/2020 1:43 AM     INDICATION: Pleuritic right lower chest pain. Right upper quadrant pain.   COMPARISON: None.   TECHNIQUE: CT angiogram chest during arterial phase injection IV contrast. 2D and 3D MIP reconstructions were performed by the CT technologist. Dose reduction techniques were used.   CONTRAST: Iohexol (Omni) 100 mL     FINDINGS:   ANGIOGRAM CHEST: No saddle or central pulmonary emboli. Peripheral segmental and intersegmental pulmonary emboli right lower lobe branches. No aneurysm or dissection involving the thoracic aorta. No evidence of right heart strain.     LUNGS AND PLEURA: No pneumothorax. Centrilobular emphysematous changes. Small amount of right basilar dependent atelectasis. Minimal atelectasis involving left lower lung. Trace right basilar effusion.     MEDIASTINUM/AXILLAE: No mediastinal or hilar adenopathy. No pericardial fluid.     UPPER ABDOMEN: Minimal atherosclerosis.     MUSCULOSKELETAL: Multilevel thoracic osteophytes. Nonspecific focal area of sclerosis L1 level on the right. Small hemangiomas T8 and T7 vertebral bodies.     IMPRESSION:   1.  Pulmonary emboli right lower lobe.   2.  Small clot burden.   3.  No evidence of heart strain.   4.  Trace right basilar effusion.   5.  Normal right basilar atelectasis.   6.  No aneurysm or dissection involving the thoracic aorta.         Primary hypertension      Priority: Medium    Hypercholesteremia      Priority: Medium    Osteoarthritis 02/17/2015     Priority: Medium      Past Medical History:    Past Medical History:   Diagnosis Date    Acute blood loss anemia 2/18/2015    Arthritis     Asthma     Asthma-COPD overlap syndrome (H) 3/1/2022    Attention deficit disorder (ADD) in adult 3/28/2020    Chronic GERD 3/28/2020    Former  "smoker, stopped smoking in distant past     Gallstone pancreatitis 2021    HTN (hypertension)     Hypercholesteremia     Hypertension     Mild intermittent asthma with exacerbation 2020    Osteopenia 3/28/2020    Right pulmonary embolus (H) 2020     Past Surgical History:    Past Surgical History:   Procedure Laterality Date    ANKLE SURGERY Left     BLADDER SURGERY      \"bladder lift\"    CATARACT EXTRACTION Bilateral     CHOLECYSTECTOMY      FOOT SURGERY Left     HYSTERECTOMY  1998    LAVH    TONSILLECTOMY & ADENOIDECTOMY Bilateral     TOTAL HIP ARTHROPLASTY Left      Family History:    Family History   Problem Relation Age of Onset    Crohn's Disease Mother     Hypertension Mother     Kidney Cancer Father     Breast Cancer Maternal Grandmother 30        bilateral mastectomy done yrs ago.    Colon Cancer Paternal Grandmother     Cancer Brother         Metastatic parotid gland tumor    Coronary Artery Disease Brother     Dementia Brother     Lung Cancer Sister     Atrial fibrillation Son     Cardiomyopathy Son     Coronary Artery Disease Son     No Known Problems Son     No Known Problems Daughter      Social History:  Marital Status:  Single [1]  Social History     Tobacco Use    Smoking status: Former     Current packs/day: 0.00     Average packs/day: 1 pack/day for 6.0 years (6.0 ttl pk-yrs)     Types: Cigarettes     Start date: 1969     Quit date: 1975     Years since quittin.1     Passive exposure: Current    Smokeless tobacco: Never   Vaping Use    Vaping status: Never Used   Substance Use Topics    Alcohol use: Yes     Alcohol/week: 0.8 standard drinks of alcohol     Comment: Alcoholic Drinks/day: occ.    Drug use: Not Currently     Types: Marijuana      Medications:    acetaminophen (TYLENOL) 500 MG tablet  amLODIPine (NORVASC) 5 MG tablet  calcium carbonate (OS-STERLING) 600 mg calcium (1,500 mg) tablet  CHOLECALCIFEROL PO  irbesartan (AVAPRO) 300 MG tablet  multivitamin " therapeutic (THERAGRAN) tablet  pravastatin (PRAVACHOL) 40 MG tablet  PROAIR RESPICLICK 108 (90 Base) MCG/ACT inhaler  QVAR REDIHALER 80 MCG/ACT inhaler      Review of Systems   All other systems reviewed and are negative.      PE   BP: (!) 137/107  Pulse: 98  Temp: 98  F (36.7  C)  Resp: 24  Weight: 91.4 kg (201 lb 8 oz)  SpO2: 97 %  Physical Exam  Vitals reviewed.   Constitutional:       General: She is not in acute distress.     Appearance: She is well-developed.   HENT:      Head: Normocephalic and atraumatic.      Right Ear: External ear normal.      Left Ear: External ear normal.      Nose: Nose normal.      Mouth/Throat:      Mouth: Mucous membranes are moist.      Pharynx: Oropharynx is clear.   Eyes:      Extraocular Movements: Extraocular movements intact.      Conjunctiva/sclera: Conjunctivae normal.      Pupils: Pupils are equal, round, and reactive to light.   Cardiovascular:      Rate and Rhythm: Normal rate and regular rhythm.   Pulmonary:      Effort: Pulmonary effort is normal.   Abdominal:      Palpations: Abdomen is soft.      Comments: Tender left lower quadrant.   Musculoskeletal:      Cervical back: Normal range of motion.      Comments: Range of motion causes low back pain.  Tenderness in her low back.   Skin:     General: Skin is warm and dry.   Neurological:      Mental Status: She is alert and oriented to person, place, and time.   Psychiatric:         Behavior: Behavior normal.         ED COURSE and MDM   1158.  Patient with left lower back pain, nonradiating.  She has chronic left lower quadrant abdominal pain.  She has tenderness.  Recent acute cystitis treated with antibiotics.  No history of ureteral stone.  Known hysterectomy and cholecystectomy.    1350.  CT imaging unremarkable.  Urine analysis unremarkable.  Blood work unremarkable.  Patient has mild ongoing pain.  Use Tylenol for comfort.  Follow-up discussed.  Likely back related source of her low back pain.  Unknown source of  her abdominal pain.    Electronic medical chart reviewed, including medical problems, medications, medical allergies, social history.  Recent hospitalizations and surgical procedures reviewed.  Recent clinic visits and consultations reviewed.  Recent labs and test results reviewed.  Nursing notes reviewed.    The patient, their parent if applicable, and/or their medical decision maker(s) and I have reviewed all of the available historical information, applicable PMH, physical exam findings, and objective diagnostic data gathered during this ED visit.  We then discussed all work-up options and then together agreed upon the course taken during this visit.  The ultimate disposition and plan was a cooperative decision made between myself and the patient, their parent if applicable, and/or their legal decision maker(s).  The risks and benefits of all decisions made during this visit were discussed to the best of my abilities given the circumstances, and all parties are understanding of the pertinent ramifications of these decisions.      LABS  Labs Ordered and Resulted from Time of ED Arrival to Time of ED Departure   BASIC METABOLIC PANEL - Abnormal       Result Value    Sodium 138      Potassium 3.7      Chloride 107      Carbon Dioxide (CO2) 23      Anion Gap 8      Urea Nitrogen 20.7      Creatinine 0.90      GFR Estimate 64      Calcium 7.8 (*)     Glucose 86     ROUTINE UA WITH MICROSCOPIC REFLEX TO CULTURE - Abnormal    Color Urine Yellow      Appearance Urine Clear      Glucose Urine Negative      Bilirubin Urine Negative      Ketones Urine Negative      Specific Gravity Urine 1.023      Blood Urine Negative      pH Urine 5.5      Protein Albumin Urine Negative      Urobilinogen Urine Normal      Nitrite Urine Negative      Leukocyte Esterase Urine Small (*)     Mucus Urine Present (*)     RBC Urine 1      WBC Urine 5      Squamous Epithelials Urine 2 (*)    CBC WITH PLATELETS AND DIFFERENTIAL    WBC Count 9.0       RBC Count 4.48      Hemoglobin 13.4      Hematocrit 41.7      MCV 93      MCH 29.9      MCHC 32.1      RDW 12.6      Platelet Count 221      % Neutrophils 73      % Lymphocytes 16      % Monocytes 8      % Eosinophils 2      % Basophils 1      % Immature Granulocytes 1      NRBCs per 100 WBC 0      Absolute Neutrophils 6.6      Absolute Lymphocytes 1.5      Absolute Monocytes 0.7      Absolute Eosinophils 0.1      Absolute Basophils 0.1      Absolute Immature Granulocytes 0.1      Absolute NRBCs 0.0         IMAGING  Images reviewed by me.  Radiology report also reviewed.  CT Abdomen Pelvis w Contrast   Final Result   IMPRESSION:    1.  No acute abnormality in the abdomen or pelvis to explain patient's symptoms.   2.  Incidental 8 mm right lower lobe pulmonary nodule, not definitely visualized in December 2022. Follow-up CT of the complete chest after an appropriate interval (3-12 months depending on clinical risk) to confirm stability and to evaluate additional    findings (Per Fleischner Society guidelines).                   Procedures    Medications   ketorolac (TORADOL) injection 15 mg (15 mg Intravenous $Given 2/15/25 1200)   iopamidol (ISOVUE-370) solution 98 mL (98 mLs Intravenous $Given 2/15/25 1224)   sodium chloride 0.9 % bag 500mL for CT scan flush use (65 mLs Intravenous $Given 2/15/25 1224)         IMPRESSION       ICD-10-CM    1. Acute left-sided low back pain without sciatica  M54.50                Medication List      There are no discharge medications for this visit.                             Oziel Valentino MD  02/15/25 9370

## 2025-02-15 NOTE — DISCHARGE INSTRUCTIONS
RETURN TO THE EMERGENCY ROOM FOR THE FOLLOWING:    Severely worsened pain, new difficulty with bowel or bladder, fever greater than 101, new weakness unrelated to pain, or at anytime for any concern.    FOLLOW UP:    With your primary clinic only as needed.    TREATMENT RECOMMENDATIONS:    Tylenol 1000 mg every 8 hours as needed for pain.    NURSE ADVICE LINE:  [phone] or [phone]

## 2025-02-17 NOTE — TELEPHONE ENCOUNTER
"Pt returning missed call and calling to give PCP update.     Pt states she ended up going to the ED on 2/15 for her symptoms/back. Pt states they did not find anything. Pt reports \"doing a little bit better.\" Pt plans to keep 3/14 appt but would like PCP to review ED notes as well and see if PCP wants to see pt sooner or if ok to keep 3/14 appt.     Writer relayed will get this to PCP for review. Advised pt to call back sooner if needed.    Pt verbalized understanding and thanked for call.   "

## 2025-02-17 NOTE — TELEPHONE ENCOUNTER
I do not have a magic ball to predict how this will go.    I reviewed the records but it still would be reasonable to see her sooner rather than later especially since it was bad enough to go to the emergency room (ER).    Still could use a reserved slot if needed for this.    Agustin Okeefe MD  General Internal Medicine  [facility]  2/17/2025, 11:43 AM

## 2025-02-20 ENCOUNTER — OFFICE VISIT (OUTPATIENT)
Dept: INTERNAL MEDICINE | Facility: CLINIC | Age: 85
End: 2025-02-20
Payer: COMMERCIAL

## 2025-02-20 VITALS
HEIGHT: 65 IN | RESPIRATION RATE: 12 BRPM | TEMPERATURE: 98.1 F | HEART RATE: 79 BPM | OXYGEN SATURATION: 98 % | WEIGHT: 199.9 LBS | SYSTOLIC BLOOD PRESSURE: 120 MMHG | BODY MASS INDEX: 33.3 KG/M2 | DIASTOLIC BLOOD PRESSURE: 76 MMHG

## 2025-02-20 DIAGNOSIS — I10 PRIMARY HYPERTENSION: Chronic | ICD-10-CM

## 2025-02-20 DIAGNOSIS — N18.31 CHRONIC KIDNEY DISEASE, STAGE 3A (H): ICD-10-CM

## 2025-02-20 DIAGNOSIS — M54.50 PAIN IN LEFT LUMBAR REGION OF BACK: Primary | ICD-10-CM

## 2025-02-20 DIAGNOSIS — R10.32 LLQ DISCOMFORT: ICD-10-CM

## 2025-02-20 DIAGNOSIS — J44.89 ASTHMA-COPD OVERLAP SYNDROME (H): ICD-10-CM

## 2025-02-20 DIAGNOSIS — R05.9 COUGH, UNSPECIFIED TYPE: ICD-10-CM

## 2025-02-20 ASSESSMENT — PAIN SCALES - GENERAL: PAINLEVEL_OUTOF10: SEVERE PAIN (7)

## 2025-02-21 NOTE — PATIENT INSTRUCTIONS
Future Appointments   Date Time Provider Department Center   5/6/2025 11:00 AM Aleah Rangel MA [facility]   8/15/2025  3:30 PM Manish Morales MD [facility]      Preventative Measures:  Health Maintenance   Topic Date Due    ANNUAL REVIEW OF HM ORDERS  12/13/2022    COPD ACTION PLAN  06/23/2062 (Originally 1940)    COVID-19 Vaccine (8 - 2024-25 season) 04/30/2025    MEDICARE ANNUAL WELLNESS VISIT  12/27/2025    LIPID  12/27/2025    MICROALBUMIN  12/27/2025    FALL RISK ASSESSMENT  02/03/2026    BMP  02/15/2026    HEMOGLOBIN  02/15/2026    ADVANCE CARE PLANNING  12/21/2028    DEXA  12/13/2033    SPIROMETRY  Completed    PHQ-2 (once per calendar year)  Completed    INFLUENZA VACCINE  Completed    Pneumococcal Vaccine: 50+ Years  Completed    URINALYSIS  Completed    ZOSTER IMMUNIZATION  Completed    RSV VACCINE  Completed    HPV IMMUNIZATION  Aged Out    MENINGITIS IMMUNIZATION  Aged Out    DTAP/TDAP/TD IMMUNIZATION  Discontinued

## 2025-02-21 NOTE — PROGRESS NOTES
[facility]    Comprehensive and complex medical care - Chronic disease management - Shared decision making - Care coordination - Compassionate care    Patient advocacy - Rational deprescribing - Minimally disruptive medicine - Ethical focus - Customized care         Date of Service: 2/20/2025  Primary Provider: Agustin Okeefe    Patient Care Team:  Agustin Okeefe MD as PCP - General (Internal Medicine)  Agustin Okeefe MD as Assigned PCP  Debi Santiago MD as MD (Ophthalmology)  Manish Morales MD as Assigned Pulmonology Provider  Eliza Maria MD as Assigned OBGYN Provider  Aleah Rangel MA as Audiologist (Audiology)  Alcides Huertas DPM as Assigned Surgical Provider          Patient's Pharmacy:    [facility] [address] [place] [address] [place] [address] [place] [address] [facility] [address]  Phone: [phone] Fax: [phone]     Patient's Contacts:  Name Home Phone Work Phone Mobile Phone Relationship Lgl GrWHITNEY Chance [phone]   Son      Patient's Insurance:    Payor: [employer] / Plan: [employer] MEDICARE / Product Type: HMO /            Active Problem List:  Problem List as of 2/20/2025 Reviewed: 2/20/2025  6:07 PM by Agustin Okeefe MD         High    Former smoker, stopped smoking in distant past    History of pulmonary embolism - 2020       Medium    Primary hypertension    Osteoarthritis    Asthma-COPD overlap syndrome (H)    Chronic kidney disease, stage 3a (H)       Low    Hypercholesteremia    Chronic GERD    Paresthesia of left foot        Current Outpatient Medications   Medication Instructions    acetaminophen (TYLENOL) 500 mg, EVERY 8 HOURS PRN    amLODIPine (NORVASC) 5 mg, Oral, EVERY EVENING, Take with the pravastatin (Pravachol)    calcium 600 mg, DAILY    CHOLECALCIFEROL PO 2,000 Units, DAILY    irbesartan (AVAPRO) 300 mg, Oral, DAILY    multivitamin therapeutic (THERAGRAN) tablet 1 tablet, EVERY MORNING    pravastatin  (PRAVACHOL) 40 mg, Oral, EVERY MORNING    PROAIR RESPICLICK 108 (90 Base) MCG/ACT inhaler INHALE 1 PUFF BY MOUTH EVERY 4 HOURS AS NEEDED    QVAR REDIHALER 80 MCG/ACT inhaler 1 puff, Inhalation, 2 TIMES DAILY      Social History     Social History Narrative    3 childen, 2 sons and 1 daughter.        Previous long term patient of Dr. Piper Greene.        Hobbies include playing guitar and other stringed instruments.            Subjective:     Aranza Mcmanus is a 84 year old female who comes in today for:    Chief Complaint   Patient presents with    Back Pain     Lower side of the back. Started about a week ago. The pain started around the stomach and now it going to the back.          2/20/2025     1:14 PM   Additional Questions   Roomed by Fausto Lopez   Accompanied by Self     Patient comes in today for a number of issues.    She has had a number of things happen in the last month and these issues were reviewed with her.    Of note, she did have a fairly severe cough and was treated for this with antibiotics and steroids.  She is doing much better in relationship to the cough.  We reviewed this in relationship to her asthma/COPD.    Her blood pressure today is doing well.    Her back and left lower quadrant were bothering her more especially last week.  This occurred soon after the cough.  She was also not as active as she normally would be and she does find that activity does help her back.  She has had x-rays of her back in the past but remotely.  She has had issues with recurrent back issues.  Back was getting bad it started radiating around to the left lower quadrant low almost to the groin area.  She denies any radiation down her leg.  She denies any numbness these areas.    She has a history of lipomas but felt a lump in her left lower quadrant as well.  She was concerned about a hernia present.  This is not in an area of previous incision.  She has had a previous hysterectomy in the past.    She went  "into the emergency room and had blood work and CT scan of the abdomen and this was reviewed with her today.  No hernias noted and no other significant issues noted.    She is doing better than she did when she went into the emergency room.  She was also concerned about diverticulitis, but there was no signs of this on her scan.    We reviewed her other issues noted in the assessment but not specifically addressed in the HPI above.     Objective:     Wt Readings from Last 3 Encounters:   02/20/25 90.7 kg (199 lb 14.4 oz)   02/15/25 91.4 kg (201 lb 8 oz)   02/03/25 91.8 kg (202 lb 4.8 oz)     BP Readings from Last 3 Encounters:   02/20/25 120/76   02/15/25 134/63   02/03/25 116/79     /76   Pulse 79   Temp 98.1  F (36.7  C) (Oral)   Resp 12   Ht 1.651 m (5' 5\")   Wt 90.7 kg (199 lb 14.4 oz)   LMP  (LMP Unknown)   SpO2 98%   BMI 33.27 kg/m     The patient is comfortable, no acute distress.  Mood good.  Insight good.  Eyes are nonicteric.  Neck is supple without mass.  No cervical adenopathy.  No thyromegaly. Heart regular rate and rhythm.  Lungs clear to auscultation bilaterally.  Respiratory effort is good.  Abdomen soft and nontender.  No hepatosplenomegaly.  She has a tender lipoma of her left lower abdomen noted.  It is fairly firm.  No signs of hernia.  Extremities no edema.      Diagnostics:     Admission on 02/15/2025, Discharged on 02/15/2025   Component Date Value Ref Range Status    Hold Specimen 02/15/2025 [facility]   Final    Hold Specimen 02/15/2025 [facility]   Final    Hold Specimen 02/15/2025 [facility]   Final    Sodium 02/15/2025 138  135 - 145 mmol/L Final    Potassium 02/15/2025 3.7  3.4 - 5.3 mmol/L Final    Chloride 02/15/2025 107  98 - 107 mmol/L Final    Carbon Dioxide (CO2) 02/15/2025 23  22 - 29 mmol/L Final    Anion Gap 02/15/2025 8  7 - 15 mmol/L Final    Urea Nitrogen 02/15/2025 20.7  8.0 - 23.0 mg/dL Final    Creatinine 02/15/2025 0.90  0.51 - 0.95 mg/dL Final    GFR Estimate 02/15/2025 64  >60 " mL/min/1.73m2 Final    eGFR calculated using 2021 CKD-EPI equation.    Calcium 02/15/2025 7.8 (L)  8.8 - 10.4 mg/dL Final    Glucose 02/15/2025 86  70 - 99 mg/dL Final    WBC Count 02/15/2025 9.0  4.0 - 11.0 10e3/uL Final    RBC Count 02/15/2025 4.48  3.80 - 5.20 10e6/uL Final    Hemoglobin 02/15/2025 13.4  11.7 - 15.7 g/dL Final    Hematocrit 02/15/2025 41.7  35.0 - 47.0 % Final    MCV 02/15/2025 93  78 - 100 fL Final    MCH 02/15/2025 29.9  26.5 - 33.0 pg Final    MCHC 02/15/2025 32.1  31.5 - 36.5 g/dL Final    RDW 02/15/2025 12.6  10.0 - 15.0 % Final    Platelet Count 02/15/2025 221  150 - 450 10e3/uL Final    % Neutrophils 02/15/2025 73  % Final    % Lymphocytes 02/15/2025 16  % Final    % Monocytes 02/15/2025 8  % Final    % Eosinophils 02/15/2025 2  % Final    % Basophils 02/15/2025 1  % Final    % Immature Granulocytes 02/15/2025 1  % Final    NRBCs per 100 WBC 02/15/2025 0  <1 /100 Final    Absolute Neutrophils 02/15/2025 6.6  1.6 - 8.3 10e3/uL Final    Absolute Lymphocytes 02/15/2025 1.5  0.8 - 5.3 10e3/uL Final    Absolute Monocytes 02/15/2025 0.7  0.0 - 1.3 10e3/uL Final    Absolute Eosinophils 02/15/2025 0.1  0.0 - 0.7 10e3/uL Final    Absolute Basophils 02/15/2025 0.1  0.0 - 0.2 10e3/uL Final    Absolute Immature Granulocytes 02/15/2025 0.1  <=0.4 10e3/uL Final    Absolute NRBCs 02/15/2025 0.0  10e3/uL Final    Color Urine 02/15/2025 Yellow  Colorless, Straw, Light Yellow, Yellow Final    Appearance Urine 02/15/2025 Clear  Clear Final    Glucose Urine 02/15/2025 Negative  Negative mg/dL Final    Bilirubin Urine 02/15/2025 Negative  Negative Final    Ketones Urine 02/15/2025 Negative  Negative mg/dL Final    Specific Gravity Urine 02/15/2025 1.023  1.003 - 1.035 Final    Blood Urine 02/15/2025 Negative  Negative Final    pH Urine 02/15/2025 5.5  5.0 - 7.0 Final    Protein Albumin Urine 02/15/2025 Negative  Negative mg/dL Final    Urobilinogen Urine 02/15/2025 Normal  Normal, 2.0 mg/dL Final    Nitrite  Urine 02/15/2025 Negative  Negative Final    Leukocyte Esterase Urine 02/15/2025 Small (A)  Negative Final    Mucus Urine 02/15/2025 Present (A)  None Seen /LPF Final    RBC Urine 02/15/2025 1  <=2 /HPF Final    WBC Urine 02/15/2025 5  <=5 /HPF Final    Squamous Epithelials Urine 02/15/2025 2 (H)  <=1 /HPF Final     I personally reviewed the results of the CT scan done prior to this visit.    ______________________________________________________________________     Pertinent radiology for this visit includes the following:    CT Abdomen Pelvis w Contrast  Narrative: EXAM: CT ABDOMEN PELVIS W CONTRAST  LOCATION: [facility]  DATE: 2/15/2025    INDICATION: LLQ abdominal pain and L low back pain  COMPARISON: CT chest, abdomen and pelvis 12/2/2022   TECHNIQUE: CT scan of the abdomen and pelvis was performed following injection of IV contrast. Multiplanar reformats were obtained. Dose reduction techniques were used.  CONTRAST: 98 mL Isovue 370    FINDINGS:   LOWER CHEST: Mild emphysema in the lung bases. No jerzy airspace consolidation or pleural effusion. Incidental 8 mm right lower lobe pulmonary nodule, not definitely visualized in December 2022 (series 5 image 7).    HEPATOBILIARY: Cholecystectomy. No evidence of biliary obstruction.    PANCREAS: Normal.    SPLEEN: Normal.    ADRENAL GLANDS: Normal.    KIDNEYS/BLADDER: Stable appearance of likely bilateral renal cysts, no specific follow-up recommended. No hydronephrosis. Urinary bladder is unremarkable.    BOWEL: No obstruction or inflammatory change. Normal appendix.    LYMPH NODES: No suspicious lymphadenopathy. No abdominopelvic free fluid.    VASCULATURE: Mild calcified and noncalcified atherosclerosis.    PELVIC ORGANS: Hysterectomy. No pelvic mass.    MUSCULOSKELETAL: No acute bony abnormality. Left hip arthroplasty partially visualized.  Impression: IMPRESSION:   1.  No acute abnormality in the abdomen or pelvis to explain  patient's symptoms.  2.  Incidental 8 mm right lower lobe pulmonary nodule, not definitely visualized in December 2022. Follow-up CT of the complete chest after an appropriate interval (3-12 months depending on clinical risk) to confirm stability and to evaluate additional   findings (Per Fleischner Society guidelines).       ______________________________________________________________________      Assessment and Plan:     1. Pain in left lumbar region of back (Primary)  Doing better at this time.  Suspect underlying spinal disease is present.  Consider MRI if symptoms are worsening over time.  Okay to monitor at this point.    2. LLQ discomfort  This may be coming from a higher lumbar spinal issue.  She does have a tender lipoma but this is otherwise good on her recent scan.  Continue to monitor.  Consider the role of scar tissue from previous surgeries.    3. Asthma-COPD overlap syndrome (H)  Continue current inhalers at this time and continue to monitor.  Her most recent CT showed a nodule but this is also present on scan at [facility] around 2020.    4. Chronic kidney disease, stage 3a (H)  Kidney function overall good.  Continue to monitor.    5. Cough, unspecified type  Doing better.    6. Primary hypertension     40 minutes or greater was spent today on the patient's care on the day of service.      This includes time for chart preparation, reviewing medical tests done before or during the visit, talking with the patient, review of quality indicators, required documentation, and other elements of care.     Agustin Okeefe MD  General Internal Medicine  [facility]    The longitudinal plan of care for the diagnoses and conditions as documented were addressed during this visit. Due to the added complexity in care, I will continue to support Aranza in the subsequent management and with ongoing continuity of care.     Return in about 10 months (around 1/2/2026) for annual  wellness visit, visit and blood work.     Future Appointments   Date Time Provider Department Center   5/6/2025 11:00 AM Aleah Rangel MA [facility]   8/15/2025  3:30 PM Manish Morales MD [facility]

## 2025-03-16 DIAGNOSIS — I10 PRIMARY HYPERTENSION: ICD-10-CM

## 2025-03-17 RX ORDER — IRBESARTAN 300 MG/1
300 TABLET ORAL DAILY
Qty: 90 TABLET | Refills: 3 | OUTPATIENT
Start: 2025-03-17

## 2025-03-29 ENCOUNTER — HOSPITAL ENCOUNTER (EMERGENCY)
Facility: CLINIC | Age: 85
Discharge: HOME | End: 2025-03-29
Attending: PHYSICIAN ASSISTANT | Admitting: PHYSICIAN ASSISTANT
Payer: COMMERCIAL

## 2025-03-29 ENCOUNTER — APPOINTMENT (OUTPATIENT)
Dept: GENERAL RADIOLOGY | Facility: CLINIC | Age: 85
End: 2025-03-29
Attending: PHYSICIAN ASSISTANT
Payer: COMMERCIAL

## 2025-03-29 VITALS
HEART RATE: 105 BPM | DIASTOLIC BLOOD PRESSURE: 81 MMHG | OXYGEN SATURATION: 95 % | TEMPERATURE: 99.1 F | SYSTOLIC BLOOD PRESSURE: 137 MMHG

## 2025-03-29 DIAGNOSIS — J45.901 ASTHMA EXACERBATION: ICD-10-CM

## 2025-03-29 DIAGNOSIS — J44.1 COPD EXACERBATION (H): ICD-10-CM

## 2025-03-29 DIAGNOSIS — J06.9 URI WITH COUGH AND CONGESTION: ICD-10-CM

## 2025-03-29 PROCEDURE — 250N000009 HC RX 250: Performed by: PHYSICIAN ASSISTANT

## 2025-03-29 PROCEDURE — 94640 AIRWAY INHALATION TREATMENT: CPT

## 2025-03-29 PROCEDURE — 71046 X-RAY EXAM CHEST 2 VIEWS: CPT

## 2025-03-29 PROCEDURE — 99214 OFFICE O/P EST MOD 30 MIN: CPT | Performed by: PHYSICIAN ASSISTANT

## 2025-03-29 PROCEDURE — 250N000012 HC RX MED GY IP 250 OP 636 PS 637: Performed by: PHYSICIAN ASSISTANT

## 2025-03-29 PROCEDURE — G0463 HOSPITAL OUTPT CLINIC VISIT: HCPCS | Mod: 25

## 2025-03-29 RX ORDER — IPRATROPIUM BROMIDE AND ALBUTEROL SULFATE 2.5; .5 MG/3ML; MG/3ML
3 SOLUTION RESPIRATORY (INHALATION) ONCE
Status: COMPLETED | OUTPATIENT
Start: 2025-03-29 | End: 2025-03-29

## 2025-03-29 RX ORDER — PREDNISONE 20 MG/1
40 TABLET ORAL ONCE
Status: COMPLETED | OUTPATIENT
Start: 2025-03-29 | End: 2025-03-29

## 2025-03-29 RX ORDER — PREDNISONE 20 MG/1
TABLET ORAL
Qty: 10 TABLET | Refills: 0 | Status: SHIPPED | OUTPATIENT
Start: 2025-03-29

## 2025-03-29 RX ORDER — ALBUTEROL SULFATE 90 UG/1
2 INHALANT RESPIRATORY (INHALATION) EVERY 6 HOURS PRN
Qty: 18 G | Refills: 0 | Status: SHIPPED | OUTPATIENT
Start: 2025-03-29 | End: 2025-04-01

## 2025-03-29 RX ORDER — ALBUTEROL SULFATE 0.83 MG/ML
2.5 SOLUTION RESPIRATORY (INHALATION) EVERY 4 HOURS PRN
Qty: 90 ML | Refills: 0 | Status: SHIPPED | OUTPATIENT
Start: 2025-03-29 | End: 2025-04-28

## 2025-03-29 RX ADMIN — IPRATROPIUM BROMIDE AND ALBUTEROL SULFATE 3 ML: .5; 3 SOLUTION RESPIRATORY (INHALATION) at 13:26

## 2025-03-29 RX ADMIN — PREDNISONE 40 MG: 20 TABLET ORAL at 13:25

## 2025-03-29 ASSESSMENT — COLUMBIA-SUICIDE SEVERITY RATING SCALE - C-SSRS
1. IN THE PAST MONTH, HAVE YOU WISHED YOU WERE DEAD OR WISHED YOU COULD GO TO SLEEP AND NOT WAKE UP?: NO
6. HAVE YOU EVER DONE ANYTHING, STARTED TO DO ANYTHING, OR PREPARED TO DO ANYTHING TO END YOUR LIFE?: NO
2. HAVE YOU ACTUALLY HAD ANY THOUGHTS OF KILLING YOURSELF IN THE PAST MONTH?: NO

## 2025-03-29 ASSESSMENT — ACTIVITIES OF DAILY LIVING (ADL): ADLS_ACUITY_SCORE: 41

## 2025-03-29 NOTE — ED PROVIDER NOTES
"  History     Chief Complaint   Patient presents with    Shortness of Breath     HPI  Aranza Mcmanus is a 84 year old female who presents to Urgent Care with complaints of progressive shortness of breath, cough, and wheezing for the past 3 to 4 days.  Patient reports a history of reactive airway disease.  She states she has been using a nebulizer at home with minimal improvement.  States her albuterol is  however.  Patient was evaluated in clinic on 2/3/2025 for COPD exacerbation and treated with doxycycline and prednisone at that time.  She states her symptoms improved and ultimately resolved with these medications.  Patient has had a recurrence of symptoms over the past 3 to 4 days with associated congestion and cough.  Denies fevers, chills, nausea, vomiting, diarrhea, abdominal pain, or chest pain.  Former smoker.  Hx Asthma-COPD.        Allergies:  Allergies   Allergen Reactions    Morphine      Other reaction(s): Hallucinations    Valacyclovir Hives and Unknown    Azithromycin Nausea and Vomiting and Rash     And fever reported.    Mold      Other reaction(s): Runny Nose, Wheezing    Oxycodone Other (See Comments)     Too strong.     Perfume      Other reaction(s): Runny Nose, Wheezing    Tobacco [Tobacco]      Other reaction(s): Runny Nose, Wheezing    \"Smoke\"       Problem List:    Patient Active Problem List    Diagnosis Date Noted    Former smoker, stopped smoking in distant past      Priority: High    Chronic kidney disease, stage 3a (H) 2024     Priority: Medium    Asthma-COPD overlap syndrome (H) 2022     Priority: Medium     Long term treatment for asthma.  CT scan of chest shows signs of chronic obstructive pulmonary disease (COPD).      Chronic GERD 2020     Priority: Medium    Paresthesia of left foot 2020     Priority: Medium     per EMG testing, nerve damage post left ankle fracture surgery      History of pulmonary embolism - 2020     Priority: Medium    "  EXAM: CTA CHEST PE RUN   LOCATION: [facility]   DATE/TIME: 1/6/2020 1:43 AM     INDICATION: Pleuritic right lower chest pain. Right upper quadrant pain.   COMPARISON: None.   TECHNIQUE: CT angiogram chest during arterial phase injection IV contrast. 2D and 3D MIP reconstructions were performed by the CT technologist. Dose reduction techniques were used.   CONTRAST: Iohexol (Omni) 100 mL     FINDINGS:   ANGIOGRAM CHEST: No saddle or central pulmonary emboli. Peripheral segmental and intersegmental pulmonary emboli right lower lobe branches. No aneurysm or dissection involving the thoracic aorta. No evidence of right heart strain.     LUNGS AND PLEURA: No pneumothorax. Centrilobular emphysematous changes. Small amount of right basilar dependent atelectasis. Minimal atelectasis involving left lower lung. Trace right basilar effusion.     MEDIASTINUM/AXILLAE: No mediastinal or hilar adenopathy. No pericardial fluid.     UPPER ABDOMEN: Minimal atherosclerosis.     MUSCULOSKELETAL: Multilevel thoracic osteophytes. Nonspecific focal area of sclerosis L1 level on the right. Small hemangiomas T8 and T7 vertebral bodies.     IMPRESSION:   1.  Pulmonary emboli right lower lobe.   2.  Small clot burden.   3.  No evidence of heart strain.   4.  Trace right basilar effusion.   5.  Normal right basilar atelectasis.   6.  No aneurysm or dissection involving the thoracic aorta.         Primary hypertension      Priority: Medium    Hypercholesteremia      Priority: Medium    Osteoarthritis 02/17/2015     Priority: Medium        Past Medical History:    Past Medical History:   Diagnosis Date    Acute blood loss anemia 2/18/2015    Arthritis     Asthma     Asthma-COPD overlap syndrome (H) 3/1/2022    Attention deficit disorder (ADD) in adult 3/28/2020    Chronic GERD 3/28/2020    Former smoker, stopped smoking in distant past     Gallstone pancreatitis 9/9/2021    HTN (hypertension)     Hypercholesteremia     Hypertension      "Mild intermittent asthma with exacerbation 2020    Osteopenia 3/28/2020    Right pulmonary embolus (H) 2020       Past Surgical History:    Past Surgical History:   Procedure Laterality Date    ANKLE SURGERY Left     BLADDER SURGERY      \"bladder lift\"    CATARACT EXTRACTION Bilateral     CHOLECYSTECTOMY      FOOT SURGERY Left     HYSTERECTOMY  1998    LAVH    TONSILLECTOMY & ADENOIDECTOMY Bilateral     TOTAL HIP ARTHROPLASTY Left        Family History:    Family History   Problem Relation Age of Onset    Crohn's Disease Mother     Hypertension Mother     Kidney Cancer Father     Breast Cancer Maternal Grandmother 30        bilateral mastectomy done yrs ago.    Colon Cancer Paternal Grandmother     Cancer Brother         Metastatic parotid gland tumor    Coronary Artery Disease Brother     Dementia Brother     Lung Cancer Sister     Atrial fibrillation Son     Cardiomyopathy Son     Coronary Artery Disease Son     No Known Problems Son     No Known Problems Daughter        Social History:  Marital Status:  Single [1]  Social History     Tobacco Use    Smoking status: Former     Current packs/day: 0.00     Average packs/day: 1 pack/day for 6.0 years (6.0 ttl pk-yrs)     Types: Cigarettes     Start date: 1969     Quit date: 1975     Years since quittin.2     Passive exposure: Current    Smokeless tobacco: Never   Vaping Use    Vaping status: Never Used   Substance Use Topics    Alcohol use: Yes     Alcohol/week: 0.8 standard drinks of alcohol     Comment: Alcoholic Drinks/day: occ.    Drug use: Not Currently     Types: Marijuana        Medications:    albuterol (PROAIR HFA/PROVENTIL HFA/VENTOLIN HFA) 108 (90 Base) MCG/ACT inhaler  albuterol (PROVENTIL) (2.5 MG/3ML) 0.083% neb solution  amoxicillin-clavulanate (AUGMENTIN) 875-125 MG tablet  predniSONE (DELTASONE) 20 MG tablet  acetaminophen (TYLENOL) 500 MG tablet  albuterol (PROAIR RESPICLICK) 108 (90 Base) MCG/ACT inhaler  albuterol " (PROVENTIL) (2.5 MG/3ML) 0.083% neb solution  amLODIPine (NORVASC) 5 MG tablet  calcium carbonate (OS-STERLING) 600 mg calcium (1,500 mg) tablet  CHOLECALCIFEROL PO  irbesartan (AVAPRO) 300 MG tablet  multivitamin therapeutic (THERAGRAN) tablet  pravastatin (PRAVACHOL) 40 MG tablet  QVAR REDIHALER 80 MCG/ACT inhaler          Review of Systems   Constitutional: Negative.  Negative for fever.   HENT: Negative.     Respiratory:  Positive for cough, shortness of breath and wheezing.    All other systems reviewed and are negative.      Physical Exam   BP: 137/81  Pulse: 105  Temp: 99.1  F (37.3  C)  SpO2: 95 %      Physical Exam  Constitutional:       General: She is not in acute distress.     Appearance: Normal appearance. She is well-developed. She is not ill-appearing, toxic-appearing or diaphoretic.   HENT:      Head: Normocephalic and atraumatic.      Right Ear: Tympanic membrane, ear canal and external ear normal.      Left Ear: Tympanic membrane, ear canal and external ear normal.      Nose: Nose normal. No congestion or rhinorrhea.      Mouth/Throat:      Lips: Pink.      Mouth: Mucous membranes are moist.      Pharynx: Oropharynx is clear. Uvula midline. No pharyngeal swelling, oropharyngeal exudate, posterior oropharyngeal erythema, uvula swelling or postnasal drip.      Tonsils: No tonsillar exudate or tonsillar abscesses.   Eyes:      Extraocular Movements: Extraocular movements intact.      Conjunctiva/sclera: Conjunctivae normal.      Pupils: Pupils are equal, round, and reactive to light.   Cardiovascular:      Rate and Rhythm: Normal rate and regular rhythm.      Heart sounds: Normal heart sounds.   Pulmonary:      Effort: Pulmonary effort is normal. No respiratory distress.      Breath sounds: Decreased air movement present. No stridor. Wheezing present. No rhonchi or rales.      Comments: Diffuse expiratory wheezing throughout lung fields  Musculoskeletal:      Cervical back: Full passive range of motion  without pain, normal range of motion and neck supple. No rigidity. Normal range of motion.   Lymphadenopathy:      Cervical: No cervical adenopathy.   Skin:     General: Skin is warm and dry.   Neurological:      Mental Status: She is alert and oriented to person, place, and time.   Psychiatric:         Behavior: Behavior is cooperative.         ED Course        Procedures      Results for orders placed or performed during the hospital encounter of 25 (from the past 24 hours)   XR Chest 2 Views    Narrative    EXAM: XR CHEST 2 VIEWS  LOCATION: [facility]  DATE: 3/29/2025    INDICATION: cough, wheezing, shortness of breath  COMPARISON: 2/3/2025      Impression    IMPRESSION: Negative chest.       Medications   ipratropium - albuterol 0.5 mg/2.5 mg/3 mL (DUONEB) neb solution 3 mL (3 mLs Nebulization $Given 3/29/25 1326)   predniSONE (DELTASONE) tablet 40 mg (40 mg Oral $Given 3/29/25 1325)       Assessments & Plan (with Medical Decision Making)     Pt is a 84 year old female who presents to Urgent Care with complaints of progressive shortness of breath, cough, and wheezing for the past 3 to 4 days.  Patient reports a history of reactive airway disease.  She states she has been using a nebulizer at home with minimal improvement.  States her albuterol is  however.  Patient was evaluated in clinic on 2/3/2025 for COPD exacerbation and treated with doxycycline and prednisone at that time.  She states her symptoms improved and ultimately resolved with these medications.  Patient has had a recurrence of symptoms over the past 3 to 4 days with associated congestion and cough.  Denies fevers.    Pt is afebrile on arrival.  Exam as above.  O2 sats of 95% on room air.  Pt was given dose of Prednisone and a Duoneb here with improvement in her symptoms.  CXR was obtained and was negative for pneumonia or acute pathology.  Discussed results with patient.  Encouraged additional symptomatic  treatments at home.  Return precautions were reviewed.  Hand-outs were provided.    Patient was sent with Augmentin, Prednisone, and Albuterol inhaler and neb refills and was instructed to follow-up with PCP for continued care and management.  She is to return to the ED for persistent and/or worsening symptoms.  Patient expressed understanding of the diagnosis and plan and was discharged home in good condition.    I have reviewed the nursing notes.    I have reviewed the findings, diagnosis, plan and need for follow up with the patient.    Discharge Medication List as of 3/29/2025  2:03 PM        START taking these medications    Details   albuterol (PROAIR HFA/PROVENTIL HFA/VENTOLIN HFA) 108 (90 Base) MCG/ACT inhaler Inhale 2 puffs into the lungs every 6 hours as needed for shortness of breath or wheezing., Disp-18 g, R-0, E-Prescribe      !! albuterol (PROVENTIL) (2.5 MG/3ML) 0.083% neb solution Take 1 vial (2.5 mg) by nebulization every 4 hours as needed for shortness of breath., Disp-90 mL, R-0, E-Prescribe      amoxicillin-clavulanate (AUGMENTIN) 875-125 MG tablet Take 1 tablet by mouth 2 times daily for 10 days., Disp-20 tablet, R-0, E-Prescribe      predniSONE (DELTASONE) 20 MG tablet Take two tablets (= 40mg) each day for 5 (five) days, Disp-10 tablet, R-0, E-Prescribe       !! - Potential duplicate medications found. Please discuss with provider.          Final diagnoses:   COPD exacerbation (H)   Asthma exacerbation   URI with cough and congestion       3/29/2025   [facility] EMERGENCY DEPT      Disclaimer:  This note consists of symbols derived from keyboarding, dictation and/or voice recognition software.  As a result, there may be errors in the script that have gone undetected.  Please consider this when interpreting information found in this chart.     Floridalma Garrido PA-C  03/30/25 1241       Floridalma Garrido PA-C  03/30/25 1247

## 2025-03-30 ASSESSMENT — ENCOUNTER SYMPTOMS
SHORTNESS OF BREATH: 1
FEVER: 0
WHEEZING: 1
CONSTITUTIONAL NEGATIVE: 1
COUGH: 1

## 2025-03-31 ENCOUNTER — TELEPHONE (OUTPATIENT)
Dept: INTERNAL MEDICINE | Facility: CLINIC | Age: 85
End: 2025-03-31

## 2025-03-31 DIAGNOSIS — J44.89 ASTHMA-COPD OVERLAP SYNDROME (H): Primary | ICD-10-CM

## 2025-03-31 NOTE — TELEPHONE ENCOUNTER
FAX [facility] Alternative Request      albuterol (PROAIR RESPICLICK) 108 (90 Base) MCG/ACT inhaler 1 each 6 3/28/2025 -- No   Sig - Route: Inhale 2 puffs into the lungs every 4 hours as needed for shortness of breath, wheezing or cough. - Inhalation       Pharmacy Comments:  Insurance would need a Prior Authorization on the ProAir Respiclick or alternative medication prescribed.    Fax does not list name of alternative medications.

## 2025-04-01 DIAGNOSIS — J44.89 ASTHMA-COPD OVERLAP SYNDROME (H): ICD-10-CM

## 2025-04-01 RX ORDER — ALBUTEROL SULFATE 90 UG/1
2 INHALANT RESPIRATORY (INHALATION) EVERY 6 HOURS PRN
Qty: 18 G | Refills: 5 | Status: SHIPPED | OUTPATIENT
Start: 2025-04-01

## 2025-04-01 RX ORDER — ALBUTEROL SULFATE 0.83 MG/ML
2.5 SOLUTION RESPIRATORY (INHALATION) EVERY 4 HOURS PRN
Qty: 90 ML | Refills: 2 | Status: SHIPPED | OUTPATIENT
Start: 2025-04-01

## 2025-04-01 NOTE — TELEPHONE ENCOUNTER
Called patient regarding her request for more neb treatments. She was seen in urgent care over the weekend and was prescribed steroids, antibiotics, and neb treatments. She has been doing the neb treatments 4 times per day. She does think she is starting to improve today and plans to cut back to 3 times per day neb treatments. She does note that last night was difficult as she could not lay down without coughing. She had been out of the house for an appointment earlier in the day and attributes feeling worse to not resting. She plans to rest today. She says her breathing is ok and it is more the congestion that is difficult for her. Her cough is productive. She is hopeful for a refill of her neb treatments as she is almost out.

## 2025-04-01 NOTE — TELEPHONE ENCOUNTER
Reason for Call:  Medication or medication refill:    Do you use a [facility]? No  Name of the pharmacy and phone number for the current request:      [facility] [id number] [place] [address] [address] [facility]     Name of the medication requested: albuterol (PROVENTIL) (2.5 MG/3ML) 0.083% neb solution     Other request: Per patient she  new box at pharmacy last week and is almost done with it.  Requesting RX for 3 boxes of nebulizer solution    Can we leave a detailed message on this number? YES    Phone number patient can be reached at: Home number on file [phone] (home)    Best Time: any    Call taken on 4/1/2025 at 11:38 AM by Nelda Greco

## 2025-04-10 ENCOUNTER — TELEPHONE (OUTPATIENT)
Dept: PULMONOLOGY | Facility: CLINIC | Age: 85
End: 2025-04-10
Payer: COMMERCIAL

## 2025-04-10 NOTE — TELEPHONE ENCOUNTER
[facility] Call [facility]    Phone Message    May a detailed message be left on voicemail: yes     Reason for Call: Other: Per patient, she was advised to let us know how she was feeling in a week. She says she is feeling better but still coughing up some yellow. She is wondering if she should come in sooner. Please advise.      Action Taken: Other: PULM    Travel Screening: Not Applicable     Date of Service: 4/10/25

## 2025-04-10 NOTE — TELEPHONE ENCOUNTER
Per Maryellen Bennett CNP:  Since she is feeling better she can continue using the albuterol nebulizer until she is back to her baseline or, as we discussed during the clinic visit, we can increase her daily inhaler strength.  It is up to her.  No need for sooner appointment at this point.     Spoke with Aranza. Encouraged her to keep using the albuterol nebulizer until she returns to baseline. Answered all questions.

## 2025-04-27 ENCOUNTER — HEALTH MAINTENANCE LETTER (OUTPATIENT)
Age: 85
End: 2025-04-27

## 2025-05-06 ENCOUNTER — OFFICE VISIT (OUTPATIENT)
Dept: AUDIOLOGY | Facility: CLINIC | Age: 85
End: 2025-05-06
Attending: INTERNAL MEDICINE
Payer: COMMERCIAL

## 2025-05-06 DIAGNOSIS — H91.93 BILATERAL HEARING LOSS, UNSPECIFIED HEARING LOSS TYPE: ICD-10-CM

## 2025-05-06 DIAGNOSIS — H90.3 SENSORINEURAL HEARING LOSS, BILATERAL: Primary | ICD-10-CM

## 2025-05-06 PROCEDURE — 92550 TYMPANOMETRY & REFLEX THRESH: CPT | Performed by: AUDIOLOGIST

## 2025-05-06 PROCEDURE — 92557 COMPREHENSIVE HEARING TEST: CPT | Performed by: AUDIOLOGIST

## 2025-05-06 NOTE — PROGRESS NOTES
AUDIOLOGY REPORT    SUBJECTIVE:  Aranza Mcmanus is a 85 year old female who was seen in the Audiology Clinic at the [facility] for audiologic evaluation, referred by Agustin Okeefe M.D. .No previous audiograms are available at today's appointment.  The patient reports a gradual decrease in hearing bilaterally. She notes difficulty hearing in groups and with her soft spoken friends.  The patient denies  bilateral tinnitus and bilateral otalgia.  The patient notes difficulty with communication in a variety of listening situations.  They were accompanied today by their self.    OBJECTIVE:    Otoscopic exam indicates ears are clear of cerumen bilaterally     Pure Tone Thresholds assessed using conventional audiometry with good  reliability from 250-8000 Hz bilaterally using circumaural headphones     RIGHT:  normal sloping to mild and moderate sensorineural hearing loss    LEFT:    normal sloping to mild and moderate sensorineural hearing loss    Tympanogram:    RIGHT: normal eardrum mobility    LEFT:   normal eardrum mobility    Reflexes (reported by stimulus ear):  RIGHT: Ipsilateral is present at normal levels  RIGHT: Contralateral is absent at frequencies tested  LEFT:   Ipsilateral is present at normal levels  LEFT:   Contralateral is absent at frequencies tested      Speech Reception Threshold:    RIGHT: 25 dB HL    LEFT:   30 dB HL  Word Recognition Score:     RIGHT: 88% at 65 dB HL using NU-6 recorded word list.    LEFT:   92% at 65 dB HL using NU-6 recorded word list.      ASSESSMENT:     ICD-10-CM    1. Sensorineural hearing loss, bilateral  H90.3       2. Bilateral hearing loss, unspecified hearing loss type  H91.93 Adult Audiology  Referral          Today s results were discussed with the patient in detail. A copy of today's audiogram was given to the patient.     PLAN:  Patient was counseled regarding hearing loss and impact on communication.  Patient is a good candidate for  amplification at this time. Handout on good communication strategies, and hearing aid use was given to patient. It is recommended that the patient check with her insurance for possible hearing aid discounts.         Aleah AMADOR, #[id number]

## 2025-05-14 ENCOUNTER — TELEPHONE (OUTPATIENT)
Dept: PULMONOLOGY | Facility: CLINIC | Age: 85
End: 2025-05-14
Payer: COMMERCIAL

## 2025-05-14 DIAGNOSIS — J44.89 ASTHMA-COPD OVERLAP SYNDROME (H): Primary | ICD-10-CM

## 2025-05-14 DIAGNOSIS — J45.41 MODERATE PERSISTENT ASTHMA WITH ACUTE EXACERBATION: ICD-10-CM

## 2025-05-14 RX ORDER — ALBUTEROL SULFATE 0.83 MG/ML
2.5 SOLUTION RESPIRATORY (INHALATION) EVERY 4 HOURS PRN
Qty: 180 ML | Refills: 0 | Status: SHIPPED | OUTPATIENT
Start: 2025-05-14

## 2025-05-14 RX ORDER — PREDNISONE 20 MG/1
40 TABLET ORAL DAILY
Qty: 10 TABLET | Refills: 0 | Status: SHIPPED | OUTPATIENT
Start: 2025-05-14 | End: 2025-05-19

## 2025-05-14 NOTE — TELEPHONE ENCOUNTER
[facility] Call [facility]    Phone Message    May a detailed message be left on voicemail: yes     Reason for Call: Symptoms or Concerns     If patient has red-flag symptoms, warm transfer to triage line    Current symptom or concern: cold symptoms, coughing green phlegm, congested.      Symptoms have been present for:  1 week(s)    Has patient previously been seen for this? Yes    By Maryellen Bennett      Date: 04/04/2025     Are there any new or worsening symptoms? Yes: per pt feeling congested.     Action Taken: Other: Pulm      Travel Screening: Not Applicable     Date of Service:

## 2025-05-14 NOTE — TELEPHONE ENCOUNTER
Spoke with Aranza. She is having increase in chest congestion with lots of green phlegm coming up. She is coughing and had a sore throat but her throat is better now. Denies fevers. No action plan noted. She has allergy to azithromycin. She has not been using her albuterol nebs because she thought they were just for wheezing. She will start those again and will need a refill of these. If she gets prescribed Augmentin again, she informs she gets yeast infection and will need a pill for that as well.   Please advise.

## 2025-05-14 NOTE — TELEPHONE ENCOUNTER
Per Maryellen Bennett CNP:  Let's hold off on antibiotics and have her use the nebs for a few days first. You can send for prednisone. We should get imaging and sputum samples before starting antibiotics since she was treated for the same symptoms last month with antibiotics.     Spoke with Aranza. Will send in the prednisone 40 mg x days and refill the albuterol nebs. She reports feeling a little less congestion after just doing one neb today. She will continue to do the nebs up to 4 times a day while symptoms persist

## 2025-05-22 ENCOUNTER — TELEPHONE (OUTPATIENT)
Dept: PULMONOLOGY | Facility: CLINIC | Age: 85
End: 2025-05-22
Payer: COMMERCIAL

## 2025-05-22 DIAGNOSIS — J44.89 ASTHMA-COPD OVERLAP SYNDROME (H): Primary | ICD-10-CM

## 2025-05-22 DIAGNOSIS — J45.41 MODERATE PERSISTENT ASTHMA WITH ACUTE EXACERBATION: ICD-10-CM

## 2025-05-22 RX ORDER — CEFPODOXIME PROXETIL 200 MG/1
200 TABLET, FILM COATED ORAL 2 TIMES DAILY
Qty: 20 TABLET | Refills: 0 | Status: SHIPPED | OUTPATIENT
Start: 2025-05-22 | End: 2025-06-01

## 2025-05-22 NOTE — TELEPHONE ENCOUNTER
Per Dr. Carrera:  Lets try cefpodoxime 200mg BID for 10 days.      [place]    Spoke with Aranza. Informed her of new order.   Dr. Carrera request that this message be passed along to Dr. Morales as an FYI only.

## 2025-05-22 NOTE — TELEPHONE ENCOUNTER
[facility] Call [facility]    Phone Message    May a detailed message be left on voicemail: yes     Reason for Call: Other: Please assist Aranza she needs a prescription for an antibiotic in pill form, if it is amoxicillin she needs it to be only one pill to avoid a potential yeast infection. Call you Aranza with any additional questions.        Action Taken: Other: Pulm    Travel Screening: Not Applicable     Date of Service:

## 2025-05-22 NOTE — TELEPHONE ENCOUNTER
Spoke with Aranza.  She finished the prednisone 40 mg x 5 days. She reports still coughing. Her phlegm was green but now it is yellow. Denies fever and no increase in shortness of breath. She is using her nebs 3-4 times a day as ordered prn. No action plan listed. Per Maryellen Bennett CNP note on 5/14/25 :  Let's hold off on antibiotics and have her use the nebs for a few days first. You can send for prednisone. We should get imaging and sputum samples before starting antibiotics since she was treated for the same symptoms last month with antibiotics.     She was treated by ER at the end of March with Augmentin but she developed a yeast infection from it. She was treated with doxycycline in February by PCP. She has allergy to azithromycin. She is requesting an antibiotic as she feels like she is just not getting over the cough, phlegm and weakness.   Will send to doc of the day, Dr. Carrera, to review and advise.

## 2025-05-27 ENCOUNTER — TELEPHONE (OUTPATIENT)
Dept: INTERNAL MEDICINE | Facility: CLINIC | Age: 85
End: 2025-05-27
Payer: COMMERCIAL

## 2025-05-27 NOTE — TELEPHONE ENCOUNTER
Reason for Call:  Appointment Request    Patient requesting this type of appt:  not feeling well, body pain, weakness for about 3-4 days    Requested provider: Agustin Okeefe    Reason patient unable to be scheduled: Not with their preferred provider    When does patient want to be seen/preferred time: 3-7 days    Comments: Requesting work in with her PCP. Please call    Could we send this information to you in [employer] or would you prefer to receive a phone call?:   Patient would prefer a phone call   Okay to leave a detailed message?: Yes at Cell number on file:    Telephone Information:   Mobile [phone]       Call taken on 5/27/2025 at 9:40 AM by Delvis Frank

## 2025-05-28 NOTE — TELEPHONE ENCOUNTER
"Attempted to call patient in regards to message below.   Left a brief vm, if pt have any other questions, give the office a call back   Dr. Okeefe's next availability spot is 6/9, but pt preferred to see within 3-7 days from yesterday.  Per PCP \" If more urgent attention needed, please be seen in urgent care to start with\"     Justina Meraz RN on 5/28/2025 at 10:04 AM      "

## 2025-05-28 NOTE — TELEPHONE ENCOUNTER
Can use a reserved slot or hospital follow up appointment slot for this in the future.    If more urgent attention needed, please be seen in urgent care to start with.    Agustin Okeefe MD  General Internal Medicine  [facility]  5/27/2025, 9:48 PM

## 2025-05-28 NOTE — TELEPHONE ENCOUNTER
"Patient Returning Call    Reason for call:  Missed call     Information relayed to patient:      Justina Meraz, RN   Registered Nurse     Telephone Encounter  Signed     Creation Time: 5/28/2025 10:02 AM     Attempted to call patient in regards to message below.   Left a brief vm, if pt have any other questions, give the office a call back   Dr. Okeefe's next availability spot is 6/9, but pt preferred to see within 3-7 days from yesterday.  Per PCP \" If more urgent attention needed, please be seen in urgent care to start with\"      Justina Meraz RN on 5/28/2025 at 10:04 AM             - 6/9/2025 appt was to early for the time she needs to travel     Patient picked 6/10/2025 at 10 AM     Writer let her know she can get in next week 6/4/2025 to see another PCP and she declined wants to See PCP Maldonado     -Writer let her know if things worsen she should make a trip to Urgent care     [facility] or would you prefer to receive a phone call?:   Patient would prefer a phone call   Okay to leave a detailed message?: Yes at Cell number on file:    Telephone Information:   Mobile [phone]       "

## 2025-06-10 ENCOUNTER — RESULTS FOLLOW-UP (OUTPATIENT)
Dept: INTERNAL MEDICINE | Facility: CLINIC | Age: 85
End: 2025-06-10

## 2025-06-10 ENCOUNTER — OFFICE VISIT (OUTPATIENT)
Dept: INTERNAL MEDICINE | Facility: CLINIC | Age: 85
End: 2025-06-10
Payer: COMMERCIAL

## 2025-06-10 VITALS
HEART RATE: 81 BPM | SYSTOLIC BLOOD PRESSURE: 114 MMHG | BODY MASS INDEX: 32.82 KG/M2 | HEIGHT: 65 IN | OXYGEN SATURATION: 98 % | DIASTOLIC BLOOD PRESSURE: 70 MMHG | TEMPERATURE: 97.5 F | WEIGHT: 197 LBS | RESPIRATION RATE: 16 BRPM

## 2025-06-10 DIAGNOSIS — M62.81 GENERALIZED MUSCLE WEAKNESS: ICD-10-CM

## 2025-06-10 DIAGNOSIS — R73.9 ELEVATED BLOOD SUGAR: ICD-10-CM

## 2025-06-10 DIAGNOSIS — R52 DIFFUSE PAIN: Primary | ICD-10-CM

## 2025-06-10 DIAGNOSIS — I10 PRIMARY HYPERTENSION: Chronic | ICD-10-CM

## 2025-06-10 DIAGNOSIS — R53.83 TIREDNESS: ICD-10-CM

## 2025-06-10 DIAGNOSIS — J44.89 ASTHMA-COPD OVERLAP SYNDROME (H): ICD-10-CM

## 2025-06-10 DIAGNOSIS — J31.0 RHINITIS, UNSPECIFIED TYPE: ICD-10-CM

## 2025-06-10 DIAGNOSIS — J45.998 OTHER ASTHMA: ICD-10-CM

## 2025-06-10 DIAGNOSIS — E78.00 HYPERCHOLESTEREMIA: ICD-10-CM

## 2025-06-10 DIAGNOSIS — N18.31 CHRONIC KIDNEY DISEASE, STAGE 3A (H): ICD-10-CM

## 2025-06-10 DIAGNOSIS — J45.50 SEVERE PERSISTENT ASTHMA, UNCOMPLICATED (H): ICD-10-CM

## 2025-06-10 LAB
ALBUMIN SERPL BCG-MCNC: 4.3 G/DL (ref 3.5–5.2)
ALP SERPL-CCNC: 81 U/L (ref 40–150)
ALT SERPL W P-5'-P-CCNC: 22 U/L (ref 0–50)
ANION GAP SERPL CALCULATED.3IONS-SCNC: 13 MMOL/L (ref 7–15)
AST SERPL W P-5'-P-CCNC: 24 U/L (ref 0–45)
BASOPHILS # BLD AUTO: 0 10E3/UL (ref 0–0.2)
BASOPHILS NFR BLD AUTO: 0 %
BILIRUB SERPL-MCNC: 0.5 MG/DL
BILIRUBIN DIRECT (ROCHE PRO & PURE): 0.19 MG/DL (ref 0–0.45)
BUN SERPL-MCNC: 19.9 MG/DL (ref 8–23)
CALCIUM SERPL-MCNC: 10.1 MG/DL (ref 8.8–10.4)
CHLORIDE SERPL-SCNC: 104 MMOL/L (ref 98–107)
CK SERPL-CCNC: 68 U/L (ref 26–192)
CREAT SERPL-MCNC: 1.08 MG/DL (ref 0.51–0.95)
CRP SERPL-MCNC: 3.4 MG/L
EGFRCR SERPLBLD CKD-EPI 2021: 51 ML/MIN/1.73M2
EOSINOPHIL # BLD AUTO: 0.3 10E3/UL (ref 0–0.7)
EOSINOPHIL NFR BLD AUTO: 4 %
ERYTHROCYTE [DISTWIDTH] IN BLOOD BY AUTOMATED COUNT: 12.8 % (ref 10–15)
ERYTHROCYTE [SEDIMENTATION RATE] IN BLOOD BY WESTERGREN METHOD: 14 MM/HR (ref 0–30)
EST. AVERAGE GLUCOSE BLD GHB EST-MCNC: 123 MG/DL
GLUCOSE SERPL-MCNC: 102 MG/DL (ref 70–99)
HBA1C MFR BLD: 5.9 % (ref 0–5.6)
HCO3 SERPL-SCNC: 22 MMOL/L (ref 22–29)
HCT VFR BLD AUTO: 43.7 % (ref 35–47)
HGB BLD-MCNC: 14 G/DL (ref 11.7–15.7)
IMM GRANULOCYTES # BLD: 0 10E3/UL
IMM GRANULOCYTES NFR BLD: 0 %
LYMPHOCYTES # BLD AUTO: 1.3 10E3/UL (ref 0.8–5.3)
LYMPHOCYTES NFR BLD AUTO: 19 %
MAGNESIUM SERPL-MCNC: 2.2 MG/DL (ref 1.7–2.3)
MCH RBC QN AUTO: 29.5 PG (ref 26.5–33)
MCHC RBC AUTO-ENTMCNC: 32 G/DL (ref 31.5–36.5)
MCV RBC AUTO: 92 FL (ref 78–100)
MONOCYTES # BLD AUTO: 0.6 10E3/UL (ref 0–1.3)
MONOCYTES NFR BLD AUTO: 9 %
NEUTROPHILS # BLD AUTO: 4.6 10E3/UL (ref 1.6–8.3)
NEUTROPHILS NFR BLD AUTO: 67 %
PHOSPHATE SERPL-MCNC: 3.3 MG/DL (ref 2.5–4.5)
PLATELET # BLD AUTO: 261 10E3/UL (ref 150–450)
POTASSIUM SERPL-SCNC: 4.7 MMOL/L (ref 3.4–5.3)
PROT SERPL-MCNC: 7.1 G/DL (ref 6.4–8.3)
RBC # BLD AUTO: 4.74 10E6/UL (ref 3.8–5.2)
SODIUM SERPL-SCNC: 139 MMOL/L (ref 135–145)
TSH SERPL DL<=0.005 MIU/L-ACNC: 1.47 UIU/ML (ref 0.3–4.2)
VIT B12 SERPL-MCNC: 701 PG/ML (ref 232–1245)
WBC # BLD AUTO: 6.9 10E3/UL (ref 4–11)

## 2025-06-10 PROCEDURE — 3074F SYST BP LT 130 MM HG: CPT | Performed by: INTERNAL MEDICINE

## 2025-06-10 PROCEDURE — 3078F DIAST BP <80 MM HG: CPT | Performed by: INTERNAL MEDICINE

## 2025-06-10 PROCEDURE — 82785 ASSAY OF IGE: CPT | Performed by: INTERNAL MEDICINE

## 2025-06-10 PROCEDURE — 3044F HG A1C LEVEL LT 7.0%: CPT | Performed by: INTERNAL MEDICINE

## 2025-06-10 PROCEDURE — 85652 RBC SED RATE AUTOMATED: CPT | Performed by: INTERNAL MEDICINE

## 2025-06-10 PROCEDURE — 82248 BILIRUBIN DIRECT: CPT | Performed by: INTERNAL MEDICINE

## 2025-06-10 PROCEDURE — 84100 ASSAY OF PHOSPHORUS: CPT | Performed by: INTERNAL MEDICINE

## 2025-06-10 PROCEDURE — G2211 COMPLEX E/M VISIT ADD ON: HCPCS | Performed by: INTERNAL MEDICINE

## 2025-06-10 PROCEDURE — 99215 OFFICE O/P EST HI 40 MIN: CPT | Performed by: INTERNAL MEDICINE

## 2025-06-10 PROCEDURE — 84443 ASSAY THYROID STIM HORMONE: CPT | Performed by: INTERNAL MEDICINE

## 2025-06-10 PROCEDURE — 82607 VITAMIN B-12: CPT | Performed by: INTERNAL MEDICINE

## 2025-06-10 PROCEDURE — 82550 ASSAY OF CK (CPK): CPT | Performed by: INTERNAL MEDICINE

## 2025-06-10 PROCEDURE — 36415 COLL VENOUS BLD VENIPUNCTURE: CPT | Performed by: INTERNAL MEDICINE

## 2025-06-10 PROCEDURE — 80053 COMPREHEN METABOLIC PANEL: CPT | Performed by: INTERNAL MEDICINE

## 2025-06-10 PROCEDURE — 83036 HEMOGLOBIN GLYCOSYLATED A1C: CPT | Performed by: INTERNAL MEDICINE

## 2025-06-10 PROCEDURE — 86140 C-REACTIVE PROTEIN: CPT | Performed by: INTERNAL MEDICINE

## 2025-06-10 PROCEDURE — 83735 ASSAY OF MAGNESIUM: CPT | Performed by: INTERNAL MEDICINE

## 2025-06-10 PROCEDURE — 86003 ALLG SPEC IGE CRUDE XTRC EA: CPT | Performed by: INTERNAL MEDICINE

## 2025-06-10 PROCEDURE — 85025 COMPLETE CBC W/AUTO DIFF WBC: CPT | Performed by: INTERNAL MEDICINE

## 2025-06-11 LAB

## 2025-06-11 NOTE — PROGRESS NOTES
[address] [address]  Phone: [phone]  Fax: [phone]    The secret of the care of the patient is in caring for the patient.  - Dr. Francis Peabody  ______________________________________________________________________     Date of Service: 6/10/2025  Primary Provider: Agustin Okeefe    Patient Care Team:  Agustin Okeefe MD as PCP - General (Internal Medicine)  Agustin Okeefe MD as Assigned PCP  Debi Santiago MD as MD (Ophthalmology)  Manish Morales MD as Assigned Pulmonology Provider  Eliza Maria MD as Assigned OBGYN Provider  Aleah Rangel MA as Audiologist (Audiology)  Alcides Huertas DPM as Assigned Surgical Provider  Maryellen Bennett APRN CNP as Pulmonologist (Pulmonary Disease)     ______________________________________________________________________     Chief Complaint   Patient presents with    Bone pain     Pt states I got a lot of bone pain (soreness pain) mostly around the biceps and ribs. Lot of muscle spasms, mostly on the right side.     Fatigue     Pt stats my weakness is really bad, with shortness of breath.     Heartburn     Slightly heartburn after eating     Derm Problem     Itchiness around the upper chest.           6/10/2025     9:54 AM   Additional Questions   Roomed by Fei Jha   Accompanied by N/A     History of Present Illness-  Aranza DO [place], 85 years    Chronic Respiratory Issues  - Has been experiencing ongoing upper respiratory symptoms since moving into her current apartment.  - Symptoms have been intermittent, lasting 3 to 6 weeks, with periods of improvement for 2-3 weeks.  - Reports a history of reactive airway disease and sensitivity to chemicals.  - Noted exposure to mold and chemical odors in the apartment, which she believes contribute to her symptoms.  - Experienced an asthma attack when exposed to certain odors near the apartment's electrical room.  - Reports slight  allergy to mold from testing done 20-30 years ago at [facility].  - Has been on prednisone and antibiotics for respiratory issues, with varying effectiveness.    Generalized Weakness and Pain  - Reports feeling very weak, especially after physical activities such as walking or holding her granddaughter.  - Describes pain in bones, particularly in the back, neck, and ribs.  - Experiences muscle spasms, primarily down the sides.  - Uses heating pads for relief when pain is severe.  - Reports balance issues and difficulty lifting her 3-year-old granddaughter.  NOT REALLY AN ISSUE WITH AM STIFFNESS.    Gastrointestinal Changes  - Reports changes in stool consistency and timing, with softer stools and occasional diarrhea in the morning.  - Noted color changes in stools, sometimes appearing greyish.  - Experiences occasional heartburn, particularly after consuming fatty foods.  - History of pancreatitis in 2000 and gallbladder removal.    Dermatological Concerns  - Reports a rash across the chest area, which feels like skin tags.  - Dermatologist did not find any visible issues during an examination approximately six months ago.    Environmental Concerns  - Reports chemical sensitivities and has changed personal care and laundry products to reduce exposure.  - Plans to move out of the current apartment due to environmental concerns and health issues.    Past Medical History Relevant to Current Symptoms  - History of pancreatitis and gallbladder removal in 2000.  - History of reactive airway disease diagnosed by a  due to industrial exposure and past smoking environments.    Blood Pressure  - Diagnosed with primary hypertension, which may contribute to overall health concerns.  ______________________________________________________________________     Active Problem List:  Problem List as of 6/10/2025 Reviewed: 2/20/2025  6:07 PM by Agustin Okeefe MD         High    Former smoker, stopped smoking in distant  "past    History of pulmonary embolism - 2020       Medium    Primary hypertension    Osteoarthritis    Asthma-COPD overlap syndrome (H)    Chronic kidney disease, stage 3a (H)       Low    Hypercholesteremia    Chronic GERD    Paresthesia of left foot     Current Outpatient Medications   Medication Instructions    acetaminophen (TYLENOL) 500 mg, EVERY 8 HOURS PRN    albuterol (PROAIR HFA/PROVENTIL HFA/VENTOLIN HFA) 108 (90 Base) MCG/ACT inhaler 2 puffs, Inhalation, EVERY 6 HOURS PRN    albuterol (PROVENTIL) 2.5 mg, Nebulization, EVERY 4 HOURS PRN    amLODIPine (NORVASC) 5 mg, Oral, EVERY EVENING, Take with the pravastatin (Pravachol)    calcium 600 mg, DAILY    CHOLECALCIFEROL PO 2,000 Units, DAILY    irbesartan (AVAPRO) 300 mg, Oral, DAILY AT 2 PM    multivitamin therapeutic (THERAGRAN) tablet 1 tablet, EVERY MORNING    pravastatin (PRAVACHOL) 40 mg, Oral, EVERY MORNING    QVAR REDIHALER 80 MCG/ACT inhaler 1 puff, Inhalation, 2 TIMES DAILY     Social History     Social History Narrative    3 childen, 2 sons and 1 daughter.        Previous long term patient of Dr. Piper Greene.        Hobbies include playing guitar and other stringed instruments.           ______________________________________________________________________     Wt Readings from Last 3 Encounters:   06/10/25 89.4 kg (197 lb)   04/04/25 90.7 kg (200 lb)   02/20/25 90.7 kg (199 lb 14.4 oz)     BP Readings from Last 3 Encounters:   06/10/25 114/70   04/04/25 110/72   03/29/25 137/81     /70   Pulse 81   Temp 97.5  F (36.4  C) (Oral)   Resp 16   Ht 1.651 m (5' 5\")   Wt 89.4 kg (197 lb)   LMP  (LMP Unknown)   SpO2 98%   PF 97 L/min   BMI 32.78 kg/m     The patient is comfortable, no acute distress.  Mood good.  Insight good.  Eyes are nonicteric.  Neck is supple without mass.  No cervical adenopathy.  No thyromegaly. Heart regular rate and rhythm.  Lungs clear to auscultation bilaterally.  Respiratory effort is good.  Extremities no " edema.  No synovitis of the joints noted.  ______________________________________________________________________     Results for orders placed or performed in visit on 06/10/25   Renal panel     Status: Abnormal   Result Value Ref Range    Sodium 139 135 - 145 mmol/L    Potassium 4.7 3.4 - 5.3 mmol/L    Chloride 104 98 - 107 mmol/L    Carbon Dioxide (CO2) 22 22 - 29 mmol/L    Anion Gap 13 7 - 15 mmol/L    Glucose 102 (H) 70 - 99 mg/dL    Urea Nitrogen 19.9 8.0 - 23.0 mg/dL    Creatinine 1.08 (H) 0.51 - 0.95 mg/dL    GFR Estimate 51 (L) >60 mL/min/1.73m2    Calcium 10.1 8.8 - 10.4 mg/dL    Albumin 4.3 3.5 - 5.2 g/dL    Phosphorus 3.3 2.5 - 4.5 mg/dL   Hemoglobin A1c     Status: Abnormal   Result Value Ref Range    Estimated Average Glucose 123 (H) <117 mg/dL    Hemoglobin A1C 5.9 (H) 0.0 - 5.6 %   TSH with free T4 reflex     Status: Normal   Result Value Ref Range    TSH 1.47 0.30 - 4.20 uIU/mL   CK total     Status: Normal   Result Value Ref Range    CK 68 26 - 192 U/L   Vitamin B12     Status: Normal   Result Value Ref Range    Vitamin B12 701 232 - 1,245 pg/mL   CRP inflammation     Status: Normal   Result Value Ref Range    CRP Inflammation 3.40 <5.00 mg/L   Erythrocyte sedimentation rate auto     Status: Normal   Result Value Ref Range    Erythrocyte Sedimentation Rate 14 0 - 30 mm/hr   Magnesium     Status: Normal   Result Value Ref Range    Magnesium 2.2 1.7 - 2.3 mg/dL   CBC with platelets and differential     Status: None   Result Value Ref Range    WBC Count 6.9 4.0 - 11.0 10e3/uL    RBC Count 4.74 3.80 - 5.20 10e6/uL    Hemoglobin 14.0 11.7 - 15.7 g/dL    Hematocrit 43.7 35.0 - 47.0 %    MCV 92 78 - 100 fL    MCH 29.5 26.5 - 33.0 pg    MCHC 32.0 31.5 - 36.5 g/dL    RDW 12.8 10.0 - 15.0 %    Platelet Count 261 150 - 450 10e3/uL    % Neutrophils 67 %    % Lymphocytes 19 %    % Monocytes 9 %    % Eosinophils 4 %    % Basophils 0 %    % Immature Granulocytes 0 %    Absolute Neutrophils 4.6 1.6 - 8.3 10e3/uL     Absolute Lymphocytes 1.3 0.8 - 5.3 10e3/uL    Absolute Monocytes 0.6 0.0 - 1.3 10e3/uL    Absolute Eosinophils 0.3 0.0 - 0.7 10e3/uL    Absolute Basophils 0.0 0.0 - 0.2 10e3/uL    Absolute Immature Granulocytes 0.0 <=0.4 10e3/uL   Alkaline phosphatase     Status: Normal   Result Value Ref Range    Alkaline Phosphatase 81 40 - 150 U/L   ALT     Status: Normal   Result Value Ref Range    ALT 22 0 - 50 U/L   AST     Status: Normal   Result Value Ref Range    AST 24 0 - 45 U/L   Bilirubin  total     Status: Normal   Result Value Ref Range    Bilirubin Total 0.5 <=1.2 mg/dL   Bilirubin direct     Status: Normal   Result Value Ref Range    Bilirubin Direct 0.19 0.00 - 0.45 mg/dL   Protein total     Status: Normal   Result Value Ref Range    Protein Total 7.1 6.4 - 8.3 g/dL   CBC with Platelets & Differential     Status: None    Narrative    The following orders were created for panel order CBC with Platelets & Differential.  Procedure                               Abnormality         Status                     ---------                               -----------         ------                     CBC with platelets and ...[[id number]]                      Final result                 Please view results for these tests on the individual orders.      ______________________________________________________________________     Diagnoses managed today:    1. Diffuse pain    2. Asthma-COPD overlap syndrome (H)    3. Severe persistent asthma, uncomplicated (H)    4. Generalized muscle weakness    5. Tiredness    6. Chronic kidney disease, stage 3a (H)    7. Hypercholesteremia    8. Primary hypertension    9. Elevated blood sugar    10. Other asthma    11. Rhinitis, unspecified type       ______________________________________________________________________     Assessment & Plan  Diffuse pain:  - Pain is widespread, affecting various areas including the neck and back. Pain may be related to past injuries and respiratory  issues.  - Repeat blood work to ensure no serious underlying conditions. Consider alterations in therapy based on blood work results.    Asthma-COPD overlap syndrome:  - History of reactive airway disease and exposure to industrial chemicals. Asthma symptoms exacerbated by environmental factors in current living situation.  - Recheck allergy situation in blood work. Consider environmental changes to reduce exposure to irritants.    Severe persistent asthma, uncomplicated:  - Asthma symptoms are persistent and exacerbated by environmental factors.  - Recheck allergy situation in blood work. Consider environmental changes to reduce exposure to irritants.    Chronic kidney disease, stage 3a:  - Monitor kidney function and adjust medications as necessary to prevent further decline in kidney function.    Generalized muscle weakness:  - Weakness is generalized and affects daily activities. May be related to inactivity and respiratory issues.  - Repeat blood work to ensure no serious underlying conditions. Consider alterations in therapy based on blood work results.    Tiredness:  - Tiredness may be related to respiratory issues and environmental factors.  - Repeat blood work to ensure no serious underlying conditions. Consider alterations in therapy based on blood work results.    Rhinitis, unspecified type:  - Rhinitis symptoms may be related to allergies and environmental factors.  - Recheck allergy situation in blood work. Consider environmental changes to reduce exposure to irritants.    Primary hypertension:  - Monitor blood pressure and adjust antihypertensive therapy as needed to maintain optimal control.    Continue current medications otherwise.  Follow up sooner if issues.    40 minutes or greater was spent today on the patient's care on the day of service.      This includes time for chart preparation, reviewing medical tests done before or during the visit, talking with the patient, review of quality  indicators, required documentation, and other elements of care.     Orders Placed This Encounter   Procedures    Renal panel    Hepatic function panel    Hemoglobin A1c    TSH with free T4 reflex    CK total    Vitamin B12    CRP inflammation    Erythrocyte sedimentation rate auto    Magnesium    [facility] Resp Allergen Panel    CBC with platelets and differential    Alkaline phosphatase    ALT    AST    Bilirubin  total    Bilirubin direct    Protein total    CBC with Platelets & Differential      Issues to follow up on:  Address the possible allergy issues and short term follow up may be needed.    Agustin Okeefe MD  General Internal Medicine  [facility]    The longitudinal plan of care for the diagnoses and conditions as documented were addressed during this visit. Due to the added complexity in care, I will continue to support Aranza in the subsequent management and with ongoing continuity of care.     Return in about 6 weeks (around 7/22/2025) for follow up visit.     Future Appointments   Date Time Provider Department Center   8/15/2025  3:30 PM Manish Morales MD [facility]

## 2025-06-11 NOTE — PATIENT INSTRUCTIONS
Future Appointments   Date Time Provider Department Center   8/15/2025  3:30 PM Manish Morales MD [facility]

## 2025-06-16 ENCOUNTER — TELEPHONE (OUTPATIENT)
Dept: INTERNAL MEDICINE | Facility: CLINIC | Age: 85
End: 2025-06-16
Payer: COMMERCIAL

## 2025-06-17 NOTE — TELEPHONE ENCOUNTER
Patient states she is doing better than she was then. Still having pain but it's better. Assisted patient in scheduling follow-up visit for 7/15 w/ PCP.

## 2025-06-17 NOTE — TELEPHONE ENCOUNTER
Please call patient -    ______________________________________________________________________     Home phone:  [phone] (home)     Cell phone:   Telephone Information:   Mobile [phone]       Other contacts:  Name Home Phone Work Phone Mobile Phone Relationship Lgl WHITNEY Vu [phone]   Son      ______________________________________________________________________     RN please call patient related to lab work and annotations from lab work 6/10.  Patient did not view on [employer].    There are 2 main messages from the predominance of the blood work and also the allergy testing.    ______________________________________________________________________      Kidneys are stable.      Your electrolytes were normal.      Your liver tests are normal.      Your markers of inflammation are normal.      A muscle enzyme test (which can be affected by cholesterol medication) was normal.      Your thyroid tests are excellent.      Your vitamin B12 levels are very good.      Still awaiting the results of your allergy tests.     Good news so far.     .Agustin Okeefe MD  General Internal Medicine  [facility]     Your blood counts are normal and you are not anemic.      Erythrocyte sedimentation rate (ESR) marker of inflammation looks good.     A1C shows risk for developing diabetes in the future, but not diabetic at this time.    Allergy panel test results are negative.      Agustin Okeefe MD  General Internal Medicine  [facility]  6/10/2025, 1:56 PM     ______________________________________________________________________     Please see how she is doing at this time.    Schedule a follow up with me in about 1 month in a reserved slot to see how she is doing.  Thank you,    Agustin Okeefe MD  [facility]  6/16/2025, 10:44 PM

## 2025-06-23 ENCOUNTER — NURSE TRIAGE (OUTPATIENT)
Dept: INTERNAL MEDICINE | Facility: CLINIC | Age: 85
End: 2025-06-23
Payer: COMMERCIAL

## 2025-06-23 NOTE — TELEPHONE ENCOUNTER
Her symptoms are not necessarily very consistent with a yeast infection.  They may be more consistent with a urinary tract infection (UTI).    I would recommend being seen in urgent care for this.    Agustin Okeefe MD  General Internal Medicine  [facility]  6/23/2025, 3:22 PM

## 2025-06-23 NOTE — TELEPHONE ENCOUNTER
Nurse Triage SBAR    Is this a 2nd Level Triage? YES, LICENSED PRACTITIONER REVIEW IS REQUIRED    Situation: Yeast infection concern    Background: Pt reports that when she takes Amoxicillin she always gets a yeast infection after    Assessment: Pt called to report symptoms that she thinks is related to yeast infection. Reports symptoms of cloudy urine, heavy bladder, frequent urination, burning with urination. Pt stated she doesn't want to take amoxicillin anymore and would like to request for different antibiotic. Reports last time a provider prescribed 1 pill for one time and it helped with her symptoms, but she's unsure of name.  Denies CP, difficulty breathing, SOB, abdominal pain, vagina pain, fever, chills.    Protocol Recommended Disposition:   See in Office Within 2 Weeks    Recommendation: Come into urgent care this week so we can get urine sample. PCP no open appointments this week. Pt would like to get antibiotics and request for PCP to send them in without being seen if possible.    Routed to provider    Does the patient meet one of the following criteria for ADS visit consideration? 16+ years old, with an [facility] PCP     TIP  Providers, please consider if this condition is appropriate for management at one of our Acute and Diagnostic Services sites.     If patient is a good candidate, please use dotphrase <dot>triageresponse and select Refer to ADS to document.    Reason for Disposition   All other vaginal symptoms  (Exceptions: Feels like prior yeast infection, minor abrasion, mild rash < 24 hour duration, mild itching, vaginal dryness during sex.)    Additional Information   Negative: Sounds like a life-threatening emergency to the triager   Negative: Followed a genital area injury (e.g., vagina, vulva)   Negative: Vaginal bleeding is main symptom   Negative: Vaginal discharge is main symptom   Negative: Pain or burning with passing urine (urination) is main symptom   Negative: Menstrual cramps is main  "symptom   Negative: Abdomen pain is main symptom   Negative: Pubic lice suspected   Negative: Itching or rash of female genital area (e.g., labia, vagina, vulva)   Negative: Pregnant and labor suspected   Negative: Patient sounds very sick or weak to the triager   Negative: SEVERE vaginal pain and not improved 2 hours after pain medicine   Negative: Genital area looks infected (e.g., draining sore, spreading redness) and fever   Negative: Something is hanging out of the vagina and can't easily be pushed back inside   Negative: MILD-MODERATE vaginal pain and present > 24 hours (Exception: Chronic pain.)   Negative: Genital area looks infected (e.g., draining sore, spreading redness)   Negative: Rash with painful tiny water blisters   Negative: MODERATE-SEVERE itching (i.e., interferes with school, work, or sleep)   Negative: Rash (e.g., redness, tiny bumps, sore) of genital area and present > 24 hours   Negative: Tender lump (swelling or \"ball\") at vaginal opening   Negative: Symptoms of a yeast infection (i.e., itchy, white discharge, not bad smelling) and not improved > 3 days following Care Advice   Negative: Vaginal itching and not improved > 3 days following Care Advice   Negative: Vaginal odor (bad smell) not improved > 3 days following Care Advice   Negative: Patient is worried they have a sexually transmitted infection (STI)   Negative: Patient wants to be seen   Negative: Feels like something inside is falling out of vagina (e.g., pressure, heaviness, fullness)   Negative: Vaginal dryness or itching and nearing menopause or after menopause   Negative: Pain with sexual intercourse (dyspareunia)  (Exception: Feels like prior yeast infection, minor abrasion, minor rash < 24 hour duration, mild itching.)   Negative: Pain in genital area is a chronic symptom (recurrent or ongoing AND present > 4 weeks)    Answer Assessment - Initial Assessment Questions  1. SYMPTOM: \"What's the main symptom you're concerned " "about?\" (e.g., pain, itching, dryness)      Yeast infection symptoms  2. LOCATION: \"Where is the  symptoms located?\" (e.g., inside/outside, left/right)      Vagina  3. ONSET: \"When did the  yeast symptoms  start?\"      Saturday  4. PAIN: \"Is there any pain?\" If Yes, ask: \"How bad is it?\" (Scale: 1-10; mild, moderate, severe)      No  5. ITCHING: \"Is there any itching?\" If Yes, ask: \"How bad is it?\" (Scale: 1-10; mild, moderate, severe)      No  6. CAUSE: \"What do you think is causing the discharge?\" \"Have you had the same problem before?\" \"What happened then?\"      Unsure, but thinks is yeast infection  7. OTHER SYMPTOMS: \"Do you have any other symptoms?\" (e.g., fever, itching, vaginal bleeding, pain with urination, injury to genital area, vaginal foreign body)      Burning pee, cloudy urine, bladder heavy  8. PREGNANCY: \"Is there any chance you are pregnant?\" \"When was your last menstrual period?\"      NA    Protocols used: Vaginal Symptoms-A-OH    "

## 2025-06-23 NOTE — TELEPHONE ENCOUNTER
Patient given disposition for UCC from PCP. Patient verbalized understanding and stated she will go to urgent care either tonight or tomorrow morning. No further questions from patient.

## 2025-06-25 ENCOUNTER — HOSPITAL ENCOUNTER (EMERGENCY)
Facility: CLINIC | Age: 85
Discharge: HOME | End: 2025-06-25
Payer: COMMERCIAL

## 2025-06-25 VITALS
RESPIRATION RATE: 17 BRPM | HEART RATE: 81 BPM | OXYGEN SATURATION: 97 % | DIASTOLIC BLOOD PRESSURE: 81 MMHG | SYSTOLIC BLOOD PRESSURE: 127 MMHG | TEMPERATURE: 98.1 F

## 2025-06-25 DIAGNOSIS — N30.00 ACUTE CYSTITIS WITHOUT HEMATURIA: ICD-10-CM

## 2025-06-25 LAB
ALBUMIN UR-MCNC: NEGATIVE MG/DL
APPEARANCE UR: ABNORMAL
BACTERIA #/AREA URNS HPF: ABNORMAL /HPF
BILIRUB UR QL STRIP: NEGATIVE
COLOR UR AUTO: ABNORMAL
GLUCOSE UR STRIP-MCNC: NEGATIVE MG/DL
HGB UR QL STRIP: NEGATIVE
HYALINE CASTS: 1 /LPF
KETONES UR STRIP-MCNC: NEGATIVE MG/DL
LEUKOCYTE ESTERASE UR QL STRIP: ABNORMAL
MUCOUS THREADS #/AREA URNS LPF: PRESENT /LPF
NITRATE UR QL: POSITIVE
PH UR STRIP: 5.5 [PH] (ref 5–7)
RBC URINE: 4 /HPF
SP GR UR STRIP: 1.01 (ref 1–1.03)
SQUAMOUS EPITHELIAL: 1 /HPF
UROBILINOGEN UR STRIP-MCNC: NORMAL MG/DL
WBC CLUMPS #/AREA URNS HPF: PRESENT /HPF
WBC URINE: >182 /HPF

## 2025-06-25 PROCEDURE — 87186 SC STD MICRODIL/AGAR DIL: CPT

## 2025-06-25 PROCEDURE — G0463 HOSPITAL OUTPT CLINIC VISIT: HCPCS

## 2025-06-25 PROCEDURE — 99213 OFFICE O/P EST LOW 20 MIN: CPT

## 2025-06-25 PROCEDURE — 81003 URINALYSIS AUTO W/O SCOPE: CPT

## 2025-06-25 RX ORDER — CEPHALEXIN 500 MG/1
500 CAPSULE ORAL 2 TIMES DAILY
Qty: 14 CAPSULE | Refills: 0 | Status: SHIPPED | OUTPATIENT
Start: 2025-06-25 | End: 2025-07-02

## 2025-06-25 RX ORDER — FLUCONAZOLE 150 MG/1
TABLET ORAL
Qty: 2 TABLET | Refills: 0 | Status: SHIPPED | OUTPATIENT
Start: 2025-06-25 | End: 2025-06-28

## 2025-06-25 ASSESSMENT — COLUMBIA-SUICIDE SEVERITY RATING SCALE - C-SSRS
6. HAVE YOU EVER DONE ANYTHING, STARTED TO DO ANYTHING, OR PREPARED TO DO ANYTHING TO END YOUR LIFE?: NO
1. IN THE PAST MONTH, HAVE YOU WISHED YOU WERE DEAD OR WISHED YOU COULD GO TO SLEEP AND NOT WAKE UP?: NO
2. HAVE YOU ACTUALLY HAD ANY THOUGHTS OF KILLING YOURSELF IN THE PAST MONTH?: NO

## 2025-06-25 ASSESSMENT — ACTIVITIES OF DAILY LIVING (ADL): ADLS_ACUITY_SCORE: 41

## 2025-06-25 NOTE — ED PROVIDER NOTES
Chief Complaint:   No chief complaint on file.       HPI:   Aranza Mcmanus is a 85 year old female who presents to the urgent care with dysuria, urinary frequency and urgency.  Symptoms began 4 day(s) ago.  She denies long duration, rigors, flank pain, temperature > 101 degrees F., Vomiting, significant nausea or diarrhea, weakness, abdominal pain, and GFR less than 30 within the last year.  Patient has history of occ UTIs.  Has not yet tried anything for symptom management.    Medications:   Current Outpatient Medications   Medication Sig Dispense Refill    cephALEXin (KEFLEX) 500 MG capsule Take 1 capsule (500 mg) by mouth 2 times daily for 7 days. 14 capsule 0    fluconazole (DIFLUCAN) 150 MG tablet Take one tablet now, and one tablet in three days 2 tablet 0    acetaminophen (TYLENOL) 500 MG tablet Take 500 mg by mouth every 8 hours as needed for mild pain.      albuterol (PROAIR HFA/PROVENTIL HFA/VENTOLIN HFA) 108 (90 Base) MCG/ACT inhaler Inhale 2 puffs into the lungs every 6 hours as needed for shortness of breath, wheezing or cough. 18 g 5    albuterol (PROVENTIL) (2.5 MG/3ML) 0.083% neb solution Take 1 vial (2.5 mg) by nebulization every 4 hours as needed for shortness of breath. 180 mL 0    amLODIPine (NORVASC) 5 MG tablet Take 1 tablet (5 mg) by mouth every evening. Take with the pravastatin (Pravachol) 90 tablet 3    calcium carbonate (OS-STERLING) 600 mg calcium (1,500 mg) tablet Take 600 mg by mouth daily.      CHOLECALCIFEROL PO Take 2,000 Units by mouth daily      irbesartan (AVAPRO) 300 MG tablet TAKE 1 TABLET BY MOUTH EVERY DAY 90 tablet 2    multivitamin therapeutic (THERAGRAN) tablet [MULTIVITAMIN THERAPEUTIC (THERAGRAN) TABLET] Take 1 tablet by mouth every morning.      pravastatin (PRAVACHOL) 40 MG tablet TAKE 1 TABLET BY MOUTH EVERY DAY IN THE MORNING 90 tablet 2    QVAR REDIHALER 80 MCG/ACT inhaler INHALE 1 PUFF BY MOUTH TWICE A DAY 21.2 g 3       Allergies:   Allergies   Allergen Reactions     "Morphine      Other reaction(s): Hallucinations    Valacyclovir Hives and Unknown    Azithromycin Nausea and Vomiting and Rash     And fever reported.    Mold      Other reaction(s): Runny Nose, Wheezing    Oxycodone Other (See Comments)     Too strong.     Perfume      Other reaction(s): Runny Nose, Wheezing    Tobacco [Tobacco]      Other reaction(s): Runny Nose, Wheezing    \"Smoke\"       Medications updated and reviewed.  Past, family and surgical history is updated and reviewed in the record.     Review of Systems:  Abdomen: see HPI  : see HPI  Back: see HPI  Immune: see HPI    Physical Exam:   /81   Pulse 81   Temp 98.1  F (36.7  C) (Tympanic)   Resp 17   LMP  (LMP Unknown)   SpO2 97%    General: healthy, alert, and no distress  Head: negative, Normocephalic.  Lungs: normal effort of breathing without signs of respiratory distress  Heart: regular rate  Abdomen: Abdomen soft, non-tender. No guarding or rebound. BS normal. No masses.  : not performed  Back/Spine: Back symmetric, no curvature. ROM normal. No CVA tenderness.    Laboratory Data:   Recent Results (from the past 24 hours)   UA with Microscopic reflex to Culture    Specimen: Urine, Clean Catch   Result Value Ref Range    Color Urine Light Yellow Colorless, Straw, Light Yellow, Yellow    Appearance Urine Slightly Cloudy (A) Clear    Glucose Urine Negative Negative mg/dL    Bilirubin Urine Negative Negative    Ketones Urine Negative Negative mg/dL    Specific Gravity Urine 1.012 1.003 - 1.035    Blood Urine Negative Negative    pH Urine 5.5 5.0 - 7.0    Protein Albumin Urine Negative Negative mg/dL    Urobilinogen Urine Normal Normal mg/dL    Nitrite Urine Positive (A) Negative    Leukocyte Esterase Urine Large (A) Negative    Bacteria Urine Few (A) None Seen /HPF    WBC Clumps Urine Present (A) None Seen /HPF    Mucus Urine Present (A) None Seen /LPF    RBC Urine 4 (H) <=2 /HPF    WBC Urine >182 (H) <=5 /HPF    Squamous Epithelials Urine " 1 <=1 /HPF    Hyaline Casts Urine 1 <=2 /LPF    Narrative    Urine Culture ordered based on laboratory criteria         Assessment:  Acute cystitis without hematuria    Plan:   85-year-old female who presents for evaluation of a 4-day duration of dysuria, urinary frequency and urgency.    On arrival patient is well-appearing, afebrile with vital signs stable.  Examination above overall reassuring.  Urinalysis obtained with slightly cloudy appearance, positive for nitrites, leuk esterase, bacteria, WBC clumps, mucus, 4 RBCs and >182 WBCs, concerning for infection.  Urine culture pending. Low suspicion for pyelonephritis at this time given patient afebrile and without abdominal pain, flank pain, significant nausea or vomiting.  Keflex sent for treatment.  Additionally provided with Diflucan due to history of vaginal candidiasis following antibiotics.  Patient advised to follow-up with primary provider if symptoms are not improving despite recommendations.  Strict UC/ED return precautions were discussed in detail including persistent fevers, development of abdominal or flank pain, severe nausea or vomiting, weakness or anything else that is concerning to them.  All questions were answered.  Patient verbalized understanding and agreement with the above plan.    Condition on disposition: Stable    Disclaimer: This note consists of symbols derived from keyboarding, dictation, and/or voice recognition software. As a result, there may be errors in the script that have gone undetected.  Please consider this when interpreting information found in the chart.             Floridalma Camarena PA-C  06/25/25 [id number]

## 2025-06-25 NOTE — DISCHARGE INSTRUCTIONS
You have urinary tract infection, or UTI. These occur when bacteria travel up the urethra into the bladder. We suspect a UTI based on chemical and microscopic findings in your urine, but if there is a question about your findings, we will do a culture to see if bacteria grow. A urine culture takes several days, and you will be contacted if anything needs to be changed based on this result.  Please return sooner if you develop abdominal or flank pain, severe nausea or start vomiting, fevers, weakness or anything else that is concerning to you.

## 2025-06-26 LAB — BACTERIA UR CULT: ABNORMAL

## 2025-07-02 ENCOUNTER — TELEPHONE (OUTPATIENT)
Dept: INTERNAL MEDICINE | Facility: CLINIC | Age: 85
End: 2025-07-02
Payer: COMMERCIAL

## 2025-07-02 DIAGNOSIS — R30.0 DYSURIA: Primary | ICD-10-CM

## 2025-07-02 NOTE — TELEPHONE ENCOUNTER
Spoke with patient who states she was at urgent care on 6/25 for UTI symptoms and was given an antibiotic. States she was told to have a repeat UA after her antibiotics were finished. States her symptoms have mostly resolved and she no longer has stomach cramps, but she feels she may still have some minor burning with urination. States she has one day left of her antibiotics but would appreciate a repeat UA.

## 2025-07-02 NOTE — TELEPHONE ENCOUNTER
She should wait to do the repeat UA at least 1 day after completing the antibiotics. If her symptoms are worsening, she should be seen.

## 2025-07-12 ENCOUNTER — LAB (OUTPATIENT)
Dept: LAB | Facility: CLINIC | Age: 85
End: 2025-07-12
Payer: COMMERCIAL

## 2025-07-12 DIAGNOSIS — R30.0 DYSURIA: ICD-10-CM

## 2025-07-12 LAB
ALBUMIN UR-MCNC: NEGATIVE MG/DL
APPEARANCE UR: ABNORMAL
BACTERIA #/AREA URNS HPF: ABNORMAL /HPF
BILIRUB UR QL STRIP: NEGATIVE
COLOR UR AUTO: YELLOW
GLUCOSE UR STRIP-MCNC: NEGATIVE MG/DL
HGB UR QL STRIP: ABNORMAL
KETONES UR STRIP-MCNC: ABNORMAL MG/DL
LEUKOCYTE ESTERASE UR QL STRIP: ABNORMAL
NITRATE UR QL: POSITIVE
PH UR STRIP: 5.5 [PH] (ref 5–8)
RBC #/AREA URNS AUTO: ABNORMAL /HPF
SP GR UR STRIP: 1.02 (ref 1–1.03)
SQUAMOUS #/AREA URNS AUTO: ABNORMAL /LPF
UROBILINOGEN UR STRIP-ACNC: 0.2 E.U./DL
WBC #/AREA URNS AUTO: >100 /HPF

## 2025-07-12 PROCEDURE — 81001 URINALYSIS AUTO W/SCOPE: CPT

## 2025-07-12 PROCEDURE — 87086 URINE CULTURE/COLONY COUNT: CPT

## 2025-07-12 PROCEDURE — 87088 URINE BACTERIA CULTURE: CPT

## 2025-07-12 PROCEDURE — 87186 SC STD MICRODIL/AGAR DIL: CPT

## 2025-07-14 DIAGNOSIS — N30.00 ACUTE CYSTITIS WITHOUT HEMATURIA: Primary | ICD-10-CM

## 2025-07-14 LAB — BACTERIA UR CULT: ABNORMAL

## 2025-07-14 RX ORDER — SULFAMETHOXAZOLE AND TRIMETHOPRIM 800; 160 MG/1; MG/1
1 TABLET ORAL 2 TIMES DAILY
Qty: 6 TABLET | Refills: 0 | Status: SHIPPED | OUTPATIENT
Start: 2025-07-14 | End: 2025-07-17

## 2025-07-15 ENCOUNTER — OFFICE VISIT (OUTPATIENT)
Dept: INTERNAL MEDICINE | Facility: CLINIC | Age: 85
End: 2025-07-15
Payer: COMMERCIAL

## 2025-07-15 VITALS
WEIGHT: 196.2 LBS | OXYGEN SATURATION: 96 % | SYSTOLIC BLOOD PRESSURE: 130 MMHG | RESPIRATION RATE: 16 BRPM | DIASTOLIC BLOOD PRESSURE: 64 MMHG | BODY MASS INDEX: 32.69 KG/M2 | HEIGHT: 65 IN | HEART RATE: 83 BPM | TEMPERATURE: 97.7 F

## 2025-07-15 DIAGNOSIS — S42.202D CLOSED FRACTURE OF PROXIMAL END OF LEFT HUMERUS WITH ROUTINE HEALING, UNSPECIFIED FRACTURE MORPHOLOGY, SUBSEQUENT ENCOUNTER: ICD-10-CM

## 2025-07-15 DIAGNOSIS — R52 DIFFUSE PAIN: ICD-10-CM

## 2025-07-15 DIAGNOSIS — N30.00 ACUTE CYSTITIS WITHOUT HEMATURIA: Primary | ICD-10-CM

## 2025-07-15 DIAGNOSIS — I10 PRIMARY HYPERTENSION: Chronic | ICD-10-CM

## 2025-07-15 DIAGNOSIS — J44.89 ASTHMA-COPD OVERLAP SYNDROME (H): ICD-10-CM

## 2025-07-15 PROCEDURE — 3078F DIAST BP <80 MM HG: CPT | Performed by: INTERNAL MEDICINE

## 2025-07-15 PROCEDURE — 1125F AMNT PAIN NOTED PAIN PRSNT: CPT | Performed by: INTERNAL MEDICINE

## 2025-07-15 PROCEDURE — G2211 COMPLEX E/M VISIT ADD ON: HCPCS | Performed by: INTERNAL MEDICINE

## 2025-07-15 PROCEDURE — 3075F SYST BP GE 130 - 139MM HG: CPT | Performed by: INTERNAL MEDICINE

## 2025-07-15 PROCEDURE — 99214 OFFICE O/P EST MOD 30 MIN: CPT | Performed by: INTERNAL MEDICINE

## 2025-07-15 ASSESSMENT — PAIN SCALES - GENERAL: PAINLEVEL_OUTOF10: MILD PAIN (3)

## 2025-07-15 NOTE — PROGRESS NOTES
[address] [address]  Phone: [phone]  Fax: [phone]    The practice of medicine is an art, not a trade; a calling, not a business; a calling in which your heart will be exercised equally with your head.  - Dr. William Osler  ______________________________________________________________________     Date of Service: 7/15/2025  Primary Provider: Agustin Okeefe    Patient Care Team:  Agustin Okeefe MD as PCP - General (Internal Medicine)  Agustin Okeefe MD as Assigned PCP  Debi Santiago MD as MD (Ophthalmology)  Manish Morales MD as Assigned Pulmonology Provider  Eliza Maria MD as Assigned OBGYN Provider  Aleah Rangel MA as Audiologist (Audiology)  Alcides Huertas DPM as Assigned Surgical Provider  Maryellne Bennett APRN CNP as Pulmonologist (Pulmonary Disease)     ______________________________________________________________________     Chief Complaint   Patient presents with    Follow Up     Diffuse pain.     Results     Patient would like to go over lab results with provider.           7/15/2025    12:43 PM   Additional Questions   Roomed by Jose J DO MA     History of Present Illness-  Aranza DO Yonathan, 85 years    Urinary tract infection and urinary symptoms  - Presented to urgent care in [place] on June 25, 2025, for urinary symptoms; was initially placed in emergency, then moved to urgent care  - Urine culture at that time showed E. coli; received antibiotics (Keflex)  - Instructed to have a repeat urinalysis after finishing antibiotics  - Underwent repeat urine testing on the Saturday prior to this encounter; new bacteria (Citrobacter) identified  - Reports that urinary symptoms have mostly resolved; no longer experiencing stomach cramps  - Currently notes urine is sometimes a little cloudier; occasionally experiences a movement sensation during urination, but not a burning sensation  - Has used flushable  wipes intermittently, which sometimes cause stinging and irritation when used multiple times  - Reports uncertainty about whether the new bacteria is a contaminant or a true infection    Left shoulder fracture and musculoskeletal symptoms  - History of left shoulder fracture in February 2022  - Reports recent improvement in shoulder mobility, able to reach up almost fully without loss of function  - Noted a soft spot in the shoulder area for about 3 days, which has since resolved  - Continues to experience some pain in the biceps, particularly in the uninjured arm, possibly due to overuse  - Reports general musculoskeletal pain, including rib cage pain and arthritis  - Describes frequent minor injuries and bruising, attributing this to thin skin, which she notes also affected her father    Asthma and environmental sensitivities  - Reports history of asthma attacks triggered by environmental exposures, including a recent episode of instant asthma attack after exposure to  gas while doing laundry  - Describes current living situation as contributing to ongoing health issues, with exposure to gas fumes,  gas, and other irritants in her apartment  - Notes fume sensitivities were known prior to moving into current residence  - Reports waking up dizzy at night after exposure to fumes from a neighboring unit    Gastrointestinal symptoms  - Reports occasional mild stomach discomfort localized to a specific area, but not severe    Misc  - Reports feeling unwell throughout the winter since moving into current residence, leading to frustration with ongoing health issues  - No mention of fever, chills, or other systemic symptoms  - No mention of new or worsening symptoms outside those detailed above  ______________________________________________________________________     Active Problem List:  Problem List as of 7/15/2025 Reviewed: 7/15/2025  1:16 PM by Agustin Okeefe MD         High    Former smoker, stopped  "smoking in distant past    History of pulmonary embolism - 2020       Medium    Primary hypertension    Osteoarthritis    Asthma-COPD overlap syndrome (H)    Chronic kidney disease, stage 3a (H)       Low    Hypercholesteremia    Chronic GERD    Paresthesia of left foot     Current Outpatient Medications   Medication Instructions    acetaminophen (TYLENOL) 500 mg, EVERY 8 HOURS PRN    albuterol (PROAIR HFA/PROVENTIL HFA/VENTOLIN HFA) 108 (90 Base) MCG/ACT inhaler 2 puffs, Inhalation, EVERY 6 HOURS PRN    albuterol (PROVENTIL) 2.5 mg, Nebulization, EVERY 4 HOURS PRN    amLODIPine (NORVASC) 5 mg, Oral, EVERY EVENING, Take with the pravastatin (Pravachol)    calcium 600 mg, DAILY    CHOLECALCIFEROL PO 2,000 Units, DAILY    irbesartan (AVAPRO) 300 mg, Oral, DAILY AT 2 PM    multivitamin therapeutic (THERAGRAN) tablet 1 tablet, EVERY MORNING    pravastatin (PRAVACHOL) 40 mg, Oral, EVERY MORNING    QVAR REDIHALER 80 MCG/ACT inhaler 1 puff, Inhalation, 2 TIMES DAILY    sulfamethoxazole-trimethoprim (BACTRIM DS) 800-160 MG tablet 1 tablet, Oral, 2 TIMES DAILY     Social History     Social History Narrative    3 childen, 2 sons and 1 daughter.        Previous long term patient of Dr. Piper Greene.        Hobbies include playing guitar and other stringed instruments.           ______________________________________________________________________     Wt Readings from Last 3 Encounters:   07/15/25 89 kg (196 lb 3.2 oz)   06/10/25 89.4 kg (197 lb)   04/04/25 90.7 kg (200 lb)     BP Readings from Last 3 Encounters:   07/15/25 130/64   06/25/25 127/81   06/10/25 114/70     /64   Pulse 83   Temp 97.7  F (36.5  C) (Oral)   Resp 16   Ht 1.651 m (5' 5\")   Wt 89 kg (196 lb 3.2 oz)   LMP  (LMP Unknown)   SpO2 96%   BMI 32.65 kg/m     The patient is comfortable, no acute distress.  Mood good.  Insight fair to good.  Eyes are nonicteric.  Neck is supple without mass.  No cervical adenopathy.  No thyromegaly. Heart " regular rate and rhythm.  Lungs clear to auscultation bilaterally.  Respiratory effort is good.  Extremities no edema.  ______________________________________________________________________     No results found for any visits on 07/15/25.   ______________________________________________________________________     Diagnoses managed today:    1. Acute cystitis without hematuria    2. Asthma-COPD overlap syndrome (H)    3. Closed fracture of proximal end of left humerus with routine healing, unspecified fracture morphology, subsequent encounter    4. Diffuse pain    5. Primary hypertension       ______________________________________________________________________     Assessment & Plan  Acute cystitis without hematuria:  - Recent urine culture showed Citrobacter, possibly a contaminant or real infection. Previous E. coli infection treated with antibiotics.  - Prescribed bactrim for Citrobacter infection. No need for repeat urinalysis unless symptoms persist.    Asthma-COPD overlap syndrome:  - Sensitive to fumes and gases, exacerbated by current living conditions.  - Moving to a new residence with better air quality. Use air purifiers as needed.    Closed fracture of proximal end of left humerus with routine healing:  - Improvement in shoulder function noted, able to reach up almost fully without loss of function.  - Continues to experience some pain in the biceps of the uninjured arm, likely due to overuse. Monitor for further improvement.    Diffuse pain:  - Reports general musculoskeletal pain, including rib cage pain and arthritis.  - Improvement in overall pain since last visit, but ongoing symptoms noted.    Primary hypertension:  - Blood work stable, no signs of serious conditions.  - Continue current management and monitoring.  - Schedule wellness visit in January 2026.     Continue current medications otherwise.  Follow up sooner if issues.    Agustin Okeefe MD  General Internal Medicine  [facility]  [facility]    The longitudinal plan of care for the diagnoses and conditions as documented were addressed during this visit. Due to the added complexity in care, I will continue to support Aranza in the subsequent management and with ongoing continuity of care.     Return in about 6 months (around 1/5/2026) for annual wellness visit, visit and blood work.     Future Appointments   Date Time Provider Department Center   8/15/2025  3:30 PM Manish Morales MD [facility]   1/5/2026 12:00 PM Agustin Okeefe MD [facility]

## 2025-07-16 NOTE — PATIENT INSTRUCTIONS
Future Appointments   Date Time Provider Department Center   8/15/2025  3:30 PM Manish Morales MD [facility]   1/5/2026 12:00 PM [facility]

## 2025-08-26 ENCOUNTER — OFFICE VISIT (OUTPATIENT)
Dept: OBGYN | Facility: CLINIC | Age: 85
End: 2025-08-26
Payer: COMMERCIAL

## 2025-08-26 VITALS
RESPIRATION RATE: 18 BRPM | DIASTOLIC BLOOD PRESSURE: 72 MMHG | WEIGHT: 195.4 LBS | TEMPERATURE: 99 F | SYSTOLIC BLOOD PRESSURE: 118 MMHG | BODY MASS INDEX: 32.55 KG/M2 | HEIGHT: 65 IN

## 2025-08-26 DIAGNOSIS — Z12.31 ENCOUNTER FOR SCREENING MAMMOGRAM FOR BREAST CANCER: ICD-10-CM

## 2025-08-26 DIAGNOSIS — R30.0 DYSURIA: Primary | ICD-10-CM

## 2025-08-26 DIAGNOSIS — N39.0 RECURRENT URINARY TRACT INFECTION: ICD-10-CM

## 2025-08-26 LAB
ALBUMIN UR-MCNC: ABNORMAL MG/DL
APPEARANCE UR: ABNORMAL
BACTERIA #/AREA URNS HPF: ABNORMAL /HPF
BILIRUB UR QL STRIP: NEGATIVE
COLOR UR AUTO: YELLOW
GLUCOSE UR STRIP-MCNC: NEGATIVE MG/DL
HGB UR QL STRIP: ABNORMAL
HYALINE CASTS #/AREA URNS LPF: ABNORMAL /LPF
KETONES UR STRIP-MCNC: NEGATIVE MG/DL
LEUKOCYTE ESTERASE UR QL STRIP: ABNORMAL
NITRATE UR QL: POSITIVE
PH UR STRIP: 5.5 [PH] (ref 5–7)
RBC #/AREA URNS AUTO: ABNORMAL /HPF
SP GR UR STRIP: 1.02 (ref 1–1.03)
SQUAMOUS #/AREA URNS AUTO: ABNORMAL /LPF
UROBILINOGEN UR STRIP-ACNC: 0.2 E.U./DL
WBC #/AREA URNS AUTO: >100 /HPF
WBC CLUMPS #/AREA URNS HPF: PRESENT /HPF

## 2025-08-26 PROCEDURE — 3078F DIAST BP <80 MM HG: CPT | Performed by: OBSTETRICS & GYNECOLOGY

## 2025-08-26 PROCEDURE — 3074F SYST BP LT 130 MM HG: CPT | Performed by: OBSTETRICS & GYNECOLOGY

## 2025-08-26 PROCEDURE — 87086 URINE CULTURE/COLONY COUNT: CPT | Performed by: OBSTETRICS & GYNECOLOGY

## 2025-08-26 PROCEDURE — 87186 SC STD MICRODIL/AGAR DIL: CPT | Performed by: OBSTETRICS & GYNECOLOGY

## 2025-08-26 PROCEDURE — 99214 OFFICE O/P EST MOD 30 MIN: CPT | Performed by: OBSTETRICS & GYNECOLOGY

## 2025-08-26 PROCEDURE — 81001 URINALYSIS AUTO W/SCOPE: CPT | Performed by: OBSTETRICS & GYNECOLOGY

## 2025-08-26 RX ORDER — ESTRADIOL 0.1 MG/G
1 CREAM VAGINAL
Qty: 42.5 G | Refills: 3 | Status: SHIPPED | OUTPATIENT
Start: 2025-08-28

## 2025-08-27 ENCOUNTER — PATIENT OUTREACH (OUTPATIENT)
Dept: CARE COORDINATION | Facility: CLINIC | Age: 85
End: 2025-08-27
Payer: COMMERCIAL

## 2025-08-27 LAB — BACTERIA UR CULT: ABNORMAL

## 2025-09-03 ENCOUNTER — HOSPITAL ENCOUNTER (OUTPATIENT)
Dept: MAMMOGRAPHY | Facility: CLINIC | Age: 85
Discharge: HOME | End: 2025-09-03
Attending: OBSTETRICS & GYNECOLOGY
Payer: COMMERCIAL

## 2025-09-03 DIAGNOSIS — Z12.31 ENCOUNTER FOR SCREENING MAMMOGRAM FOR BREAST CANCER: ICD-10-CM

## 2025-09-03 PROCEDURE — 77063 BREAST TOMOSYNTHESIS BI: CPT
